# Patient Record
Sex: FEMALE | Race: WHITE | HISPANIC OR LATINO | Employment: FULL TIME | URBAN - METROPOLITAN AREA
[De-identification: names, ages, dates, MRNs, and addresses within clinical notes are randomized per-mention and may not be internally consistent; named-entity substitution may affect disease eponyms.]

---

## 2017-03-11 ENCOUNTER — HOSPITAL ENCOUNTER (OUTPATIENT)
Facility: MEDICAL CENTER | Age: 22
End: 2017-03-12
Attending: SPECIALIST | Admitting: SPECIALIST
Payer: MEDICAID

## 2017-03-12 VITALS
TEMPERATURE: 98.4 F | HEIGHT: 62 IN | SYSTOLIC BLOOD PRESSURE: 122 MMHG | WEIGHT: 201 LBS | HEART RATE: 91 BPM | DIASTOLIC BLOOD PRESSURE: 80 MMHG | BODY MASS INDEX: 36.99 KG/M2

## 2017-03-12 PROCEDURE — 59025 FETAL NON-STRESS TEST: CPT | Performed by: SPECIALIST

## 2017-03-12 NOTE — PROGRESS NOTES
Pt is a 20 yo  with an EDC of 3/29 making her 37w4d here for UCs for last 3 hours.  TOCO shows no UCs and FHT reactive and reassuring.  Denies LOF, vaginal bleeding, and reports + FM    SVE done closed and thick    0010 - Dr. Herrera notified, discharge orders received    0020 - Pt signed discharge orders and will have f/u OB appt with Dr. Herrera next week

## 2017-03-23 ENCOUNTER — HOSPITAL ENCOUNTER (INPATIENT)
Facility: MEDICAL CENTER | Age: 22
LOS: 2 days | End: 2017-03-25
Attending: SPECIALIST | Admitting: SPECIALIST
Payer: MEDICAID

## 2017-03-23 LAB
BASOPHILS # BLD AUTO: 0.2 % (ref 0–1.8)
BASOPHILS # BLD: 0.02 K/UL (ref 0–0.12)
EOSINOPHIL # BLD AUTO: 0.06 K/UL (ref 0–0.51)
EOSINOPHIL NFR BLD: 0.5 % (ref 0–6.9)
ERYTHROCYTE [DISTWIDTH] IN BLOOD BY AUTOMATED COUNT: 45 FL (ref 35.9–50)
ERYTHROCYTE [DISTWIDTH] IN BLOOD BY AUTOMATED COUNT: 45.8 FL (ref 35.9–50)
HCT VFR BLD AUTO: 30.6 % (ref 37–47)
HCT VFR BLD AUTO: 32.5 % (ref 37–47)
HGB BLD-MCNC: 10.3 G/DL (ref 12–16)
HGB BLD-MCNC: 9.6 G/DL (ref 12–16)
HOLDING TUBE BB 8507: NORMAL
IMM GRANULOCYTES # BLD AUTO: 0.11 K/UL (ref 0–0.11)
IMM GRANULOCYTES NFR BLD AUTO: 0.9 % (ref 0–0.9)
LYMPHOCYTES # BLD AUTO: 3.1 K/UL (ref 1–4.8)
LYMPHOCYTES NFR BLD: 25 % (ref 22–41)
MCH RBC QN AUTO: 24.6 PG (ref 27–33)
MCH RBC QN AUTO: 24.6 PG (ref 27–33)
MCHC RBC AUTO-ENTMCNC: 31.4 G/DL (ref 33.6–35)
MCHC RBC AUTO-ENTMCNC: 31.7 G/DL (ref 33.6–35)
MCV RBC AUTO: 77.8 FL (ref 81.4–97.8)
MCV RBC AUTO: 78.5 FL (ref 81.4–97.8)
MONOCYTES # BLD AUTO: 0.81 K/UL (ref 0–0.85)
MONOCYTES NFR BLD AUTO: 6.5 % (ref 0–13.4)
NEUTROPHILS # BLD AUTO: 8.31 K/UL (ref 2–7.15)
NEUTROPHILS NFR BLD: 66.9 % (ref 44–72)
NRBC # BLD AUTO: 0 K/UL
NRBC BLD AUTO-RTO: 0 /100 WBC
PLATELET # BLD AUTO: 245 K/UL (ref 164–446)
PLATELET # BLD AUTO: 254 K/UL (ref 164–446)
PMV BLD AUTO: 10.3 FL (ref 9–12.9)
PMV BLD AUTO: 10.5 FL (ref 9–12.9)
RBC # BLD AUTO: 3.9 M/UL (ref 4.2–5.4)
RBC # BLD AUTO: 4.18 M/UL (ref 4.2–5.4)
WBC # BLD AUTO: 11.9 K/UL (ref 4.8–10.8)
WBC # BLD AUTO: 12.4 K/UL (ref 4.8–10.8)

## 2017-03-23 PROCEDURE — 85025 COMPLETE CBC W/AUTO DIFF WBC: CPT

## 2017-03-23 PROCEDURE — 700111 HCHG RX REV CODE 636 W/ 250 OVERRIDE (IP)

## 2017-03-23 PROCEDURE — 36415 COLL VENOUS BLD VENIPUNCTURE: CPT

## 2017-03-23 PROCEDURE — 700111 HCHG RX REV CODE 636 W/ 250 OVERRIDE (IP): Performed by: SPECIALIST

## 2017-03-23 PROCEDURE — 85027 COMPLETE CBC AUTOMATED: CPT

## 2017-03-23 PROCEDURE — 59409 OBSTETRICAL CARE: CPT

## 2017-03-23 PROCEDURE — 700101 HCHG RX REV CODE 250

## 2017-03-23 PROCEDURE — 700102 HCHG RX REV CODE 250 W/ 637 OVERRIDE(OP): Performed by: SPECIALIST

## 2017-03-23 PROCEDURE — 10H07YZ INSERTION OF OTHER DEVICE INTO PRODUCTS OF CONCEPTION, VIA NATURAL OR ARTIFICIAL OPENING: ICD-10-PCS | Performed by: SPECIALIST

## 2017-03-23 PROCEDURE — 770002 HCHG ROOM/CARE - OB PRIVATE (112)

## 2017-03-23 PROCEDURE — 303615 HCHG EPIDURAL/SPINAL ANESTHESIA FOR LABOR

## 2017-03-23 PROCEDURE — 4A1H74Z MONITORING OF PRODUCTS OF CONCEPTION, CARDIAC ELECTRICAL ACTIVITY, VIA NATURAL OR ARTIFICIAL OPENING: ICD-10-PCS | Performed by: SPECIALIST

## 2017-03-23 PROCEDURE — A9270 NON-COVERED ITEM OR SERVICE: HCPCS | Performed by: SPECIALIST

## 2017-03-23 PROCEDURE — 304965 HCHG RECOVERY SERVICES

## 2017-03-23 RX ORDER — ONDANSETRON 4 MG/1
4 TABLET, ORALLY DISINTEGRATING ORAL EVERY 6 HOURS PRN
Status: DISCONTINUED | OUTPATIENT
Start: 2017-03-23 | End: 2017-03-25 | Stop reason: HOSPADM

## 2017-03-23 RX ORDER — MISOPROSTOL 200 UG/1
800 TABLET ORAL
Status: DISCONTINUED | OUTPATIENT
Start: 2017-03-23 | End: 2017-03-23 | Stop reason: HOSPADM

## 2017-03-23 RX ORDER — VITAMIN A ACETATE, BETA CAROTENE, ASCORBIC ACID, CHOLECALCIFEROL, .ALPHA.-TOCOPHEROL ACETATE, DL-, THIAMINE MONONITRATE, RIBOFLAVIN, NIACINAMIDE, PYRIDOXINE HYDROCHLORIDE, FOLIC ACID, CYANOCOBALAMIN, CALCIUM CARBONATE, FERROUS FUMARATE, ZINC OXIDE, CUPRIC OXIDE 3080; 12; 120; 400; 1; 1.84; 3; 20; 22; 920; 25; 200; 27; 10; 2 [IU]/1; UG/1; MG/1; [IU]/1; MG/1; MG/1; MG/1; MG/1; MG/1; [IU]/1; MG/1; MG/1; MG/1; MG/1; MG/1
1 TABLET, FILM COATED ORAL EVERY MORNING
Status: DISCONTINUED | OUTPATIENT
Start: 2017-03-23 | End: 2017-03-25 | Stop reason: HOSPADM

## 2017-03-23 RX ORDER — ACETAMINOPHEN 325 MG/1
325 TABLET ORAL EVERY 4 HOURS PRN
Status: DISCONTINUED | OUTPATIENT
Start: 2017-03-23 | End: 2017-03-25 | Stop reason: HOSPADM

## 2017-03-23 RX ORDER — ROPIVACAINE HYDROCHLORIDE 2 MG/ML
INJECTION, SOLUTION EPIDURAL; INFILTRATION; PERINEURAL
Status: COMPLETED
Start: 2017-03-23 | End: 2017-03-23

## 2017-03-23 RX ORDER — BUPIVACAINE HYDROCHLORIDE 2.5 MG/ML
INJECTION, SOLUTION EPIDURAL; INFILTRATION; INTRACAUDAL
Status: ACTIVE
Start: 2017-03-23 | End: 2017-03-23

## 2017-03-23 RX ORDER — IBUPROFEN 600 MG/1
600 TABLET ORAL EVERY 6 HOURS PRN
Status: DISCONTINUED | OUTPATIENT
Start: 2017-03-23 | End: 2017-03-25 | Stop reason: HOSPADM

## 2017-03-23 RX ORDER — SODIUM CHLORIDE, SODIUM LACTATE, POTASSIUM CHLORIDE, CALCIUM CHLORIDE 600; 310; 30; 20 MG/100ML; MG/100ML; MG/100ML; MG/100ML
INJECTION, SOLUTION INTRAVENOUS CONTINUOUS
Status: DISPENSED | OUTPATIENT
Start: 2017-03-23 | End: 2017-03-23

## 2017-03-23 RX ORDER — MISOPROSTOL 200 UG/1
600 TABLET ORAL
Status: DISCONTINUED | OUTPATIENT
Start: 2017-03-23 | End: 2017-03-25 | Stop reason: HOSPADM

## 2017-03-23 RX ORDER — OXYCODONE HYDROCHLORIDE AND ACETAMINOPHEN 5; 325 MG/1; MG/1
2 TABLET ORAL EVERY 4 HOURS PRN
Status: DISCONTINUED | OUTPATIENT
Start: 2017-03-23 | End: 2017-03-25 | Stop reason: HOSPADM

## 2017-03-23 RX ORDER — SODIUM CHLORIDE, SODIUM LACTATE, POTASSIUM CHLORIDE, CALCIUM CHLORIDE 600; 310; 30; 20 MG/100ML; MG/100ML; MG/100ML; MG/100ML
INJECTION, SOLUTION INTRAVENOUS
Status: COMPLETED
Start: 2017-03-23 | End: 2017-03-23

## 2017-03-23 RX ORDER — SODIUM CHLORIDE, SODIUM LACTATE, POTASSIUM CHLORIDE, CALCIUM CHLORIDE 600; 310; 30; 20 MG/100ML; MG/100ML; MG/100ML; MG/100ML
INJECTION, SOLUTION INTRAVENOUS PRN
Status: DISCONTINUED | OUTPATIENT
Start: 2017-03-23 | End: 2017-03-25 | Stop reason: HOSPADM

## 2017-03-23 RX ORDER — DOCUSATE SODIUM 100 MG/1
100 CAPSULE, LIQUID FILLED ORAL 2 TIMES DAILY PRN
Status: DISCONTINUED | OUTPATIENT
Start: 2017-03-23 | End: 2017-03-25 | Stop reason: HOSPADM

## 2017-03-23 RX ORDER — ALUMINA, MAGNESIA, AND SIMETHICONE 2400; 2400; 240 MG/30ML; MG/30ML; MG/30ML
30 SUSPENSION ORAL EVERY 6 HOURS PRN
Status: DISCONTINUED | OUTPATIENT
Start: 2017-03-23 | End: 2017-03-23 | Stop reason: HOSPADM

## 2017-03-23 RX ORDER — OXYCODONE HYDROCHLORIDE AND ACETAMINOPHEN 5; 325 MG/1; MG/1
1 TABLET ORAL EVERY 4 HOURS PRN
Status: DISCONTINUED | OUTPATIENT
Start: 2017-03-23 | End: 2017-03-25 | Stop reason: HOSPADM

## 2017-03-23 RX ORDER — ONDANSETRON 2 MG/ML
4 INJECTION INTRAMUSCULAR; INTRAVENOUS EVERY 6 HOURS PRN
Status: DISCONTINUED | OUTPATIENT
Start: 2017-03-23 | End: 2017-03-25 | Stop reason: HOSPADM

## 2017-03-23 RX ADMIN — Medication 1 MILLI-UNITS/MIN: at 01:35

## 2017-03-23 RX ADMIN — IBUPROFEN 600 MG: 600 TABLET, FILM COATED ORAL at 12:42

## 2017-03-23 RX ADMIN — Medication 125 ML/HR: at 12:35

## 2017-03-23 RX ADMIN — SODIUM CHLORIDE, POTASSIUM CHLORIDE, SODIUM LACTATE AND CALCIUM CHLORIDE: 600; 310; 30; 20 INJECTION, SOLUTION INTRAVENOUS at 09:44

## 2017-03-23 RX ADMIN — IBUPROFEN 600 MG: 600 TABLET, FILM COATED ORAL at 18:54

## 2017-03-23 RX ADMIN — ACETAMINOPHEN 325 MG: 325 TABLET, FILM COATED ORAL at 07:30

## 2017-03-23 RX ADMIN — SODIUM CHLORIDE, POTASSIUM CHLORIDE, SODIUM LACTATE AND CALCIUM CHLORIDE: 600; 310; 30; 20 INJECTION, SOLUTION INTRAVENOUS at 01:15

## 2017-03-23 RX ADMIN — ROPIVACAINE HYDROCHLORIDE 200 MG: 2 INJECTION, SOLUTION EPIDURAL; INFILTRATION at 04:55

## 2017-03-23 RX ADMIN — SODIUM CHLORIDE, POTASSIUM CHLORIDE, SODIUM LACTATE AND CALCIUM CHLORIDE 1000 ML: 600; 310; 30; 20 INJECTION, SOLUTION INTRAVENOUS at 08:05

## 2017-03-23 RX ADMIN — SODIUM CHLORIDE, POTASSIUM CHLORIDE, SODIUM LACTATE AND CALCIUM CHLORIDE: 600; 310; 30; 20 INJECTION, SOLUTION INTRAVENOUS at 05:03

## 2017-03-23 RX ADMIN — ONDANSETRON 4 MG: 2 INJECTION, SOLUTION INTRAMUSCULAR; INTRAVENOUS at 06:58

## 2017-03-23 RX ADMIN — SODIUM CHLORIDE, POTASSIUM CHLORIDE, SODIUM LACTATE AND CALCIUM CHLORIDE: 600; 310; 30; 20 INJECTION, SOLUTION INTRAVENOUS at 08:03

## 2017-03-23 ASSESSMENT — PAIN SCALES - GENERAL
PAINLEVEL_OUTOF10: 4
PAINLEVEL_OUTOF10: 3
PAINLEVEL_OUTOF10: 0
PAINLEVEL_OUTOF10: 6

## 2017-03-23 ASSESSMENT — LIFESTYLE VARIABLES
DO YOU DRINK ALCOHOL: NO
DO YOU DRINK ALCOHOL: NO
ALCOHOL_USE: NO
EVER_SMOKED: NEVER

## 2017-03-23 NOTE — CARE PLAN
Problem: Pain  Goal: Alleviation of Pain or a reduction in pain to the patient’s comfort goal  Outcome: PROGRESSING AS EXPECTED  Pt is currently comfortable with epidural.     Problem: Risk for Infection, Impaired Wound Healing  Goal: Remain free from signs and symptoms of infection  Outcome: PROGRESSING AS EXPECTED  Pt shows no s/s of infection. VSS.

## 2017-03-23 NOTE — PROGRESS NOTES
The patient is a very pleasant 21 year old multipara (para 3, with 3 previous vaginal deliveries) who is today 39 weeks and 1 day gestation with prenatal care with myself who presented to L&D just prior to midnight with complaints of leakage of fluid per vagina and spontaneous rupture of membranes was verified and she was admitted and pitocin was started. I saw her at about 03:00 AM. She then received a labor epidural. I just checked her cervix and I found her cervix to be about 4 cm dilated and 70 percent effaced and -2 station and I then placed an IUPC. The fetal heart tracing is category one.   Humberto Herrera M.D.

## 2017-03-23 NOTE — PROGRESS NOTES
Pt arrived to S330 via wheelchair with L&D RN. Report received from Gabbi. Assessment completed. Denies pain at this time. Pt oriented to room, call light, infant security, emergency light, and unit routine. Encouraged to call for assistance.

## 2017-03-23 NOTE — PROGRESS NOTES
0030- 22 y/o, , EDC 3/29/17, EGA 39.1. Here to room 217 with FOB Mat. C/o SROM at 2300. EFM/toco applied, patient denies bleeding, reports positive fm. VSS. SVE as noted. Gross pooling noted upon exam. Dr. Herrera called with updates. Admission orders received.     0130- Pitocin started see MAR. Patient educated.     0300- Dr. Herrera to bedside. SVE as noted. Patient still not feeling UCs yet. Patient would like epidural once she becomes uncomfortable.    0400- Patient requesting epidural. Fluid bolus started.     0440- Dr. Juarez to bedside. Epidural placed without complications.     0500- Vera placed. SVE as noted.     0630- Discussed placement of IUPC with Dr. Herrera for better titration of pitocin. Dr. Herrera to come and place one soon.    0700- Bedside report to SANTO Conde RN. POC discussed.

## 2017-03-23 NOTE — CARE PLAN
Problem: Potential for postpartum infection related to presence of episiotomy/vaginal tear and/or uterine contamination  Goal: Patient will be absent from signs and symptoms of infection  Outcome: PROGRESSING AS EXPECTED  Pt shows no s/s of infection at this time. Vital signs and temperature are WNL    Problem: Alteration in comfort related to episiotomy, vaginal repair and/or after birth pains  Goal: Patient is able to ambulate, care for self and infant  Outcome: PROGRESSING AS EXPECTED  Pt demonstrates ability to ambulate safely and steadily and care for self and infant appropriately

## 2017-03-23 NOTE — IP AVS SNAPSHOT
Home Care Instructions                                                                                                                Juana Child   MRN: [id number]    Department:  POST PARTUM 31   3/23/2017           Follow-up Information     1. Follow up with Humberto Herrera M.D. In 6 weeks.    Specialty:  OB/Gyn    Contact information    [address]  [place] [address]  [phone]         I assume responsibility for securing a follow-up [place] Screening blood test on my baby within the specified date range.    -                  Discharge Instructions       POSTPARTUM DISCHARGE INSTRUCTIONS FOR MOM    YOB: 1995   Age: 21 y.o.               Admit Date: 3/23/2017     Discharge Date: 3/25/2017  Attending Doctor:  Humberto Herrera M.D.                  Allergies:  Review of patient's allergies indicates no known allergies.    Discharged to home by car. Discharged via wheelchair, hospital escort: Yes.  Special equipment needed: Not Applicable  Belongings with: Personal  Be sure to schedule a follow-up appointment with your primary care doctor or any specialists as instructed.     Discharge Plan:   Diet Plan: Discussed  Activity Level: Discussed  Confirmed Follow up Appointment: Appointment Scheduled  Confirmed Symptoms Management: Discussed  Medication Reconciliation Updated: Yes  Influenza Vaccine Indication: Patient Refuses    REASONS TO CALL YOUR OBSTETRICIAN:  1.   Persistent fever or shaking chills (Temperature higher than 100.4)  2.   Heavy bleeding (soaking more than 1 pad per hour); Passing clots  3.   Foul odor from vagina  4.   Mastitis (Breast infection; breast pain, chills, fever, redness)  5.   Urinary pain, burning or frequency  6.   Episiotomy infection  7.   Abdominal incision infection  8.   Severe depression longer than 24 hours    HAND WASHING  · Prior to handling the baby.  · Before breastfeeding or bottle feeding baby.  · After using the bathroom or changing the baby's  "diaper.    WOUND CARE  Ask your physician for additional care instructions.  In general:    ·  Incision:      · Keep clean and dry.    · Do NOT lift anything heavier than your baby for up to 6 weeks.    · There should not be any opening or pus.      VAGINAL CARE  · Nothing inside vagina for 6 weeks: no sexual intercourse, tampons or douching.  · Bleeding may continue for 2-4 weeks.  Amount may vary.    · Call your physician for heavy bleeding which means soaking more than 1 pad per hour    BIRTH CONTROL  · It is possible to become pregnant at any time after delivery and while breastfeeding.  · Plan to discuss a method of birth control with your physician at your follow up visit. visit.    DIET AND ELIMINATION  · Eating more fiber (bran cereal, fruits, and vegetables) and drinking plenty of fluids will help to avoid constipation.  · Urinary frequency after childbirth is normal.    POSTPARTUM BLUES  During the first few days after birth, you may experience a sense of the \"blues\" which may include impatience, irritability or even crying.  These feeling come and go quickly.  However, as many as 1 in 10 women experience emotional symptoms known as postpartum depression.    Postpartum depression:  May start as early as the second or third day after delivery or take several weeks or months to develop.  Symptoms of \"blues\" are present, but are more intense:  Crying spells; loss of appetite; feelings of hopelessness or loss of control; fear of touching the baby; over concern or no concern at all about the baby; little or no concern about your own appearance/caring for yourself; and/or inability to sleep or excessive sleeping.  Contact your physician if you are experiencing any of these symptoms.    Crisis Hotline:  · [facility] Crisis Hotline:  3-538-JPHKRPO  Or 1-402.564.5178  · [place] Crisis Hotline:  1-570.541.3700  Or 155-432-4895    PREVENTING SHAKEN BABY:  If you are angry or stressed, PUT THE BABY IN THE CRIB, " "step away, take some deep breaths, and wait until you are calm to care for the baby.  DO NOT SHAKE THE BABY.  You are not alone, call a supporter for help.    · Crisis Call Center 24/7 crisis line 926-052-3399 or 1-176.146.8780  · You can also text them, text \"ANSWER\" to 778319    QUIT SMOKING/TOBACCO USE:  I understand the use of any tobacco products increases my chance of suffering from future heart disease and could cause other illnesses which may shorten my life. Quitting the use of tobacco products is the single most important thing I can do to improve my health. For further information on smoking / tobacco cessation call a Toll Free Quit Line at 1-801.172.3994 (*[employer]) or 1-764.993.7339 (American Lung Association) or you can access the web based program at www.lungusa.org.    · [place] Tobacco Users Help Line:  (756) 357-5586       Toll Free: 1-533.224.6032  · Quit Tobacco Program [facility] (895)405-0218    DEPRESSION / SUICIDE RISK:  As you are discharged from this [facility], it is important to learn how to keep safe from harming yourself.    Recognize the warning signs:  · Abrupt changes in personality, positive or negative- including increase in energy   · Giving away possessions  · Change in eating patterns- significant weight changes-  positive or negative  · Change in sleeping patterns- unable to sleep or sleeping all the time   · Unwillingness or inability to communicate  · Depression  · Unusual sadness, discouragement and loneliness  · Talk of wanting to die  · Neglect of personal appearance   · Rebelliousness- reckless behavior  · Withdrawal from people/activities they love  · Confusion- inability to concentrate     If you or a loved one observes any of these behaviors or has concerns about self-harm, here's what you can do:  · Talk about it- your feelings and reasons for harming yourself  · Remove any means that you might use to hurt yourself " (examples: pills, rope, extension cords, firearm)  · Get professional help from the community (Mental Health, Substance Abuse, psychological counseling)  · Do not be alone:Call your Safe Contact- someone whom you trust who will be there for you.  · Call your local CRISIS HOTLINE 151-6954 or 686-608-1435  · Call your local Children's Mobile Crisis Response Team [place] (970) 925-7531 or www.[employer] ([employer])  · Call the toll free National Suicide Prevention Hotlines   · National Suicide Prevention Lifeline 551-169-YRDV (4352)  · National Hope Line Network 800-SUICIDE (288-0035)    DISCHARGE SURVEY:  Thank you for choosing [facility].  We hope we provided you with very good care.  You may be receiving a survey in the mail.  Please fill it out.  Your opinion is valuable to us.    ADDITIONAL EDUCATIONAL MATERIALS GIVEN TO PATIENT:        My signature on this form indicates that:  1.  I have reviewed and understand the above information  2.  My questions regarding this information have been answered to my satisfaction.  3.  I have formulated a plan with my discharge nurse to obtain my prescribed medication for home.         Discharge Medication Instructions:    Below are the medications your physician expects you to take upon discharge:    Review all your home medications and newly ordered medications with your doctor and/or pharmacist. Follow medication instructions as directed by your doctor and/or pharmacist.    Please keep your medication list with you and share with your physician.               Medication List      START taking these medications        Instructions     MG Caps   Last time this was given:  100 mg on 3/24/2017  6:36 PM    Take 100 mg by mouth 2 times a day as needed for Constipation.   Dose:  100 mg       ferrous sulfate 325 (65 FE) MG tablet    Take 1 Tab by mouth every day.   Dose:  325 mg       ibuprofen 600 MG Tabs   Last time this was given:  600 mg on 3/25/2017  6:22 AM      Commonly known as:  MOTRIN    Take 1 Tab by mouth every 6 hours as needed (For cramping after delivery; do not give if patient is receiving ketorolac (Toradol)).   Dose:  600 mg               Crisis Hotline:     [place] Crisis Hotline:  9-238-YLWWGEG or 1-257.437.5729    [place] Crisis Hotline:    1-378.215.4589 or 503-490-6138        Disclaimer           _____________________________________                     __________       ________       Patient/Mother Signature or Legal                          Date                   Time

## 2017-03-23 NOTE — PROGRESS NOTES
0700 - report received from [place] Magaña RN. Family x1 at bedside. POC discussed, questions encouraged and answered, understanding verbalized. Will continue to monitor.   0720-Dr. Herrera at the bedside. SVE 4/70/-2. IUPC placed. Pt tolerated well. POC discussed and questions answered.   0900-SVE 6/100/-2  0940-SVE 7/100/-1  0945-Dr. Herrera updated on pt status, SVE, and repetitive variable decelerations. Order received to start an amnioinfusion with LR with a 400 cc bolus and then running at 100 cc/ hr afterwards.   0950-Amnioinfusion started.   1020-SVE 7/100/-1. Done by MARJORIE Mckeon RN.  1100-SVE c/+1. Dr. Herrera updated on SVE  1128-Pushing started.   1145-Dr. Herrera at the bedside.   1149- of viable female infant. 8/9 APGARS  1152-Spontaneous delivery of normal, intact placenta.   1440- up to bathroom with assistance from RN. Marisa care provided and taught, understanding verbalized.   1450- transferred to postpartum via wheelchair with infant in arms, report given to Richy RN.

## 2017-03-23 NOTE — DELIVERY NOTE
Normal spontaneous vaginal delivery.   Live term female   Apgar scores of 8 and 9 at one and five minutes respectively.   Weight 3,035 grams  Over intact perineum under continuous epidural anesthesia  Placenta simply delivered, examined and found to be intact.   Small posterior vaginal wall abrasion repaired with a single figure of eight suture with 3-0 chromic.   No vag pacs or rectal sutures.   EBL about 200 cc's.   Humberto Herrera M.D.

## 2017-03-23 NOTE — IP AVS SNAPSHOT
[employer] Access Code: [email or web address]  Expires: 4/24/2017 10:09 AM    Your email address is not on file at [facility].  Email Addresses are required for you to sign up for [employer], please contact [phone] to verify your personal information and to provide your email address prior to attempting to register for [employer].    Juana Mateusz  [address] [place] [address], [place] [address]    [employer]  A secure, online tool to manage your health information     [facility]’s [employer]® is a secure, online tool that connects you to your personalized health information from the privacy of your home -- day or night - making it very easy for you to manage your healthcare. Once the activation process is completed, you can even access your medical information using the [employer] ronan, which is available for free in the Apple Ronan store or Google Play store.     To learn more about [employer], visit [email or web address]    There are two levels of access available (as shown below):   My Chart Features  [facility] Primary Care Doctor [facility]  Specialists [facility]  Urgent  Care Non-[facility] Primary Care Doctor   Email your healthcare team securely and privately 24/7 X X X    Manage appointments: schedule your next appointment; view details of past/upcoming appointments X      Request prescription refills. X      View recent personal medical records, including lab and immunizations X X X X   View health record, including health history, allergies, medications X X X X   Read reports about your outpatient visits, procedures, consult and ER notes X X X X   See your discharge summary, which is a recap of your hospital and/or ER visit that includes your diagnosis, lab results, and care plan X X  X     How to register for [employer]:  Once your e-mail address has been verified, follow the following steps to sign up for [employer].     1. Go to  [email or web address]  2. Click on the Sign Up Now box, which takes you to the New Member Sign Up page. You  will need to provide the following information:  a. Enter your [employer] Access Code exactly as it appears at the top of this page. (You will not need to use this code after you’ve completed the sign-up process. If you do not sign up before the expiration date, you must request a new code.)   b. Enter your date of birth.   c. Enter your home email address.   d. Click Submit, and follow the next screen’s instructions.  3. Create a [employer] ID. This will be your [employer] login ID and cannot be changed, so think of one that is secure and easy to remember.  4. Create a [employer] password. You can change your password at any time.  5. Enter your Password Reset Question and Answer. This can be used at a later time if you forget your password.   6. Enter your e-mail address. This allows you to receive e-mail notifications when new information is available in [employer].  7. Click Sign Up. You can now view your health information.    For assistance activating your [employer] account, call [phone]

## 2017-03-24 PROCEDURE — 700112 HCHG RX REV CODE 229: Performed by: SPECIALIST

## 2017-03-24 PROCEDURE — 302131 K PAD MOTOR: Performed by: SPECIALIST

## 2017-03-24 PROCEDURE — A9270 NON-COVERED ITEM OR SERVICE: HCPCS | Performed by: SPECIALIST

## 2017-03-24 PROCEDURE — 302151 K-PAD 14X20: Performed by: SPECIALIST

## 2017-03-24 PROCEDURE — 700102 HCHG RX REV CODE 250 W/ 637 OVERRIDE(OP): Performed by: SPECIALIST

## 2017-03-24 PROCEDURE — 770002 HCHG ROOM/CARE - OB PRIVATE (112)

## 2017-03-24 RX ADMIN — DOCUSATE SODIUM 100 MG: 100 CAPSULE ORAL at 18:36

## 2017-03-24 RX ADMIN — IBUPROFEN 600 MG: 600 TABLET, FILM COATED ORAL at 05:42

## 2017-03-24 RX ADMIN — IBUPROFEN 600 MG: 600 TABLET, FILM COATED ORAL at 18:31

## 2017-03-24 RX ADMIN — IBUPROFEN 600 MG: 600 TABLET, FILM COATED ORAL at 12:10

## 2017-03-24 RX ADMIN — Medication 1 TABLET: at 10:05

## 2017-03-24 ASSESSMENT — PAIN SCALES - GENERAL
PAINLEVEL_OUTOF10: 3
PAINLEVEL_OUTOF10: 6
PAINLEVEL_OUTOF10: 2
PAINLEVEL_OUTOF10: 5
PAINLEVEL_OUTOF10: 3
PAINLEVEL_OUTOF10: 3
PAINLEVEL_OUTOF10: 2

## 2017-03-24 ASSESSMENT — PATIENT HEALTH QUESTIONNAIRE - PHQ9
SUM OF ALL RESPONSES TO PHQ9 QUESTIONS 1 AND 2: 0
SUM OF ALL RESPONSES TO PHQ QUESTIONS 1-9: 0
1. LITTLE INTEREST OR PLEASURE IN DOING THINGS: NOT AT ALL
2. FEELING DOWN, DEPRESSED, IRRITABLE, OR HOPELESS: NOT AT ALL

## 2017-03-24 NOTE — PROGRESS NOTES
Assessment completed. Fundus is firm, lochia is light. No clots noted. Discussed plan of care for this shift. Encouraged to call for needs or concerns. Call light in reach. Patient will call for pain medications.

## 2017-03-24 NOTE — PROGRESS NOTES
Pt assessment complete, wnl. Fundus firm with minimal discharge.  Pt ambulating and voiding without difficulty. Bonding well with infant, assisted pt with breast feeding. Plan of care discussed with patient for the day. Questions answered. Encouraged to call with needs, will monitor.

## 2017-03-24 NOTE — DISCHARGE PLANNING
Referral: NBN regarding maternal history of anxiety and depression.  Intervention: Discussed with nursing and reviewed medical record. Depression and anxiety are from 2013. Mother very appropriate with infant care.   Intervention: Met with mother Juana. Infant Vero Infante. Mother confirms address and phone. She has 3 other children. FOB is Mat Infante. Infant will be followed ant [facility] where siblings receive care as well. She has Medicaid and food stamps. 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 Mother works at [employer]. They are prepared for infant. Mother admits to history of anxiety and depression in 2013. She denies any recent symptoms. She is not concerned but would discuss with her MD if needed. She has good support system.  Plan: Nothing further needed. Infant to discharge with mother when ready.

## 2017-03-24 NOTE — DELIVERY NOTE
DATE OF SERVICE:  2017    The patient is a very pleasant 21-year-old multipara (on admission, para 3   with 3 previous vaginal deliveries) with prenatal care with myself during the   course of this pregnancy and she is today 39 weeks and 1 day gestation and she   presented to labor and delivery around midnight with complaints of leakage of   fluid per vagina, starting at about 11:00 p.m. and on presentation to labor   and delivery obvious evidence of spontaneous rupture of membranes was noted   and so she was admitted to labor and delivery and her cervix initially was   found to be about 3 cm dilated or so and somewhat thick.  She was started on   Pitocin.  The Pitocin was increased.  She received a labor epidural, which   worked well.  I saw her at about 3:00 this morning.  At 3:00 in the morning, I   checked her cervix and found her cervix to about 4 cm dilated and 70% effaced   and -2 station and at that time placed an intrauterine pressure catheter.    Her labor continued.  A labor epidural worked well.  Her fetal heart tracing   continued to be category 1.  Then, at about noon time, or so (Thursday,   2017), she went on to have a normal spontaneous vaginal delivery of a   live term female  with Apgar scores of 8 and 9 at 1 and 5 minutes   respectively and a  weight of 3035 g.  Baby was delivered over an   intact perineum under continuous epidural anesthesia.  The placenta was simply   delivered and examined and found to be complete.  A small posterior wall   vaginal abrasion was sutured with placement of a single interrupted   figure-of-eight suture of 3-0 chromic.  Otherwise, there were no lacerations.    Bimanual vaginal exam revealed that the uterus was firm and that there were   no gauze sponges in the vagina.  Digital rectal exam was performed and   revealed that the anal sphincter was circumferentially intact and that the   rectal mucosa was intact.  There are no sutures in the  rectum.    ESTIMATED BLOOD LOSS:  Approximately 200 mL.       ____________________________________     MD RAMANDEEP CORTES / NTS    DD:  03/23/2017 12:10:45  DT:  03/23/2017 20:58:56    D#:  [id number]  Job#:  [id number]

## 2017-03-24 NOTE — CARE PLAN
Problem: Alteration in comfort related to episiotomy, vaginal repair and/or after birth pains  Goal: Patient verbalizes acceptable pain level  Outcome: PROGRESSING AS EXPECTED  Patient states pain is tolerable with medication and hot packs.  Will continue to monitor with hourly rounding and will medicate using 0-10 pain scale.    Problem: Potential knowledge deficit related to lack of understanding of self and  care  Goal: Patient will demonstrate ability to care for self and infant  Outcome: PROGRESSING AS EXPECTED  Patient is able to care for self and baby, patient recognizes baby cues and responds appropriately.

## 2017-03-24 NOTE — CARE PLAN
Problem: Altered physiologic condition related to immediate post-delivery state and potential for bleeding/hemorrhage  Goal: Patient physiologically stable as evidenced by normal lochia, palpable uterine involution and vital signs within normal limits  Outcome: PROGRESSING AS EXPECTED  IV pitocin infusing at 125ml/hr, fundus is firm, lochia is light.    Problem: Potential for postpartum infection related to presence of episiotomy/vaginal tear and/or uterine contamination  Goal: Patient will be absent from signs and symptoms of infection  Outcome: PROGRESSING AS EXPECTED  Will monitor for changes in amount of bleeding, or vital signs.

## 2017-03-24 NOTE — PROGRESS NOTES
POST PARTUM DAY # 1  The patient complains of cramping pain.   She says that she feels fine other wise and she says that she has no other problems or complaints.   She says that she would like to say until tomorrow.   The patient's vital signs are stable and she is afebrile.   She appears well developed and well nourished and relaxed and alert and comfortable and in no apparent distress.   Labs: the patient's hemoglobin went from 10.3 grams per deciliter antepartum to 9.6 grams per deciliter post partum.   We will plan on discharge home tomorrow.   Humberto Herrera M.D.

## 2017-03-25 VITALS
TEMPERATURE: 97.8 F | BODY MASS INDEX: 36.68 KG/M2 | OXYGEN SATURATION: 93 % | SYSTOLIC BLOOD PRESSURE: 92 MMHG | HEART RATE: 64 BPM | HEIGHT: 63 IN | DIASTOLIC BLOOD PRESSURE: 59 MMHG | RESPIRATION RATE: 16 BRPM | WEIGHT: 207 LBS

## 2017-03-25 PROCEDURE — A9270 NON-COVERED ITEM OR SERVICE: HCPCS | Performed by: SPECIALIST

## 2017-03-25 PROCEDURE — 700102 HCHG RX REV CODE 250 W/ 637 OVERRIDE(OP): Performed by: SPECIALIST

## 2017-03-25 RX ORDER — FERROUS SULFATE 325(65) MG
325 TABLET ORAL DAILY
Qty: 30 TAB | Refills: 0 | Status: SHIPPED | OUTPATIENT
Start: 2017-03-25 | End: 2017-06-02

## 2017-03-25 RX ORDER — PSEUDOEPHEDRINE HCL 30 MG
100 TABLET ORAL 2 TIMES DAILY PRN
Qty: 60 CAP | Refills: 1 | Status: SHIPPED | OUTPATIENT
Start: 2017-03-25 | End: 2017-06-02

## 2017-03-25 RX ORDER — IBUPROFEN 600 MG/1
600 TABLET ORAL EVERY 6 HOURS PRN
Qty: 30 TAB | Refills: 0 | Status: SHIPPED | OUTPATIENT
Start: 2017-03-25 | End: 2020-10-13

## 2017-03-25 RX ADMIN — IBUPROFEN 600 MG: 600 TABLET, FILM COATED ORAL at 00:42

## 2017-03-25 RX ADMIN — IBUPROFEN 600 MG: 600 TABLET, FILM COATED ORAL at 06:22

## 2017-03-25 RX ADMIN — Medication 1 TABLET: at 08:32

## 2017-03-25 ASSESSMENT — PAIN SCALES - GENERAL
PAINLEVEL_OUTOF10: 2
PAINLEVEL_OUTOF10: 5
PAINLEVEL_OUTOF10: 5
PAINLEVEL_OUTOF10: 2
PAINLEVEL_OUTOF10: 5

## 2017-03-25 NOTE — CARE PLAN
Problem: Alteration in comfort related to episiotomy, vaginal repair and/or after birth pains  Goal: Patient verbalizes acceptable pain level  Outcome: PROGRESSING AS EXPECTED  Patient verbalized acceptable pain level @ this time. Will be medicated as needed.    Problem: Potential knowledge deficit related to lack of understanding of self and  care  Goal: Patient will verbalize understanding of self and infant care  Outcome: PROGRESSING AS EXPECTED  Patient verbalized that she can able to take care of her self and infant.

## 2017-03-25 NOTE — DISCHARGE INSTRUCTIONS
POSTPARTUM DISCHARGE INSTRUCTIONS FOR MOM    YOB: 1995   Age: 21 y.o.               Admit Date: 3/23/2017     Discharge Date: 3/25/2017  Attending Doctor:  Humberto Herrera M.D.                  Allergies:  Review of patient's allergies indicates no known allergies.    Discharged to home by car. Discharged via wheelchair, hospital escort: Yes.  Special equipment needed: Not Applicable  Belongings with: Personal  Be sure to schedule a follow-up appointment with your primary care doctor or any specialists as instructed.     Discharge Plan:   Diet Plan: Discussed  Activity Level: Discussed  Confirmed Follow up Appointment: Appointment Scheduled  Confirmed Symptoms Management: Discussed  Medication Reconciliation Updated: Yes  Influenza Vaccine Indication: Patient Refuses    REASONS TO CALL YOUR OBSTETRICIAN:  1.   Persistent fever or shaking chills (Temperature higher than 100.4)  2.   Heavy bleeding (soaking more than 1 pad per hour); Passing clots  3.   Foul odor from vagina  4.   Mastitis (Breast infection; breast pain, chills, fever, redness)  5.   Urinary pain, burning or frequency  6.   Episiotomy infection  7.   Abdominal incision infection  8.   Severe depression longer than 24 hours    HAND WASHING  · Prior to handling the baby.  · Before breastfeeding or bottle feeding baby.  · After using the bathroom or changing the baby's diaper.    WOUND CARE  Ask your physician for additional care instructions.  In general:    ·  Incision:      · Keep clean and dry.    · Do NOT lift anything heavier than your baby for up to 6 weeks.    · There should not be any opening or pus.      VAGINAL CARE  · Nothing inside vagina for 6 weeks: no sexual intercourse, tampons or douching.  · Bleeding may continue for 2-4 weeks.  Amount may vary.    · Call your physician for heavy bleeding which means soaking more than 1 pad per hour    BIRTH CONTROL  · It is possible to become pregnant at any time after delivery  "and while breastfeeding.  · Plan to discuss a method of birth control with your physician at your follow up visit. visit.    DIET AND ELIMINATION  · Eating more fiber (bran cereal, fruits, and vegetables) and drinking plenty of fluids will help to avoid constipation.  · Urinary frequency after childbirth is normal.    POSTPARTUM BLUES  During the first few days after birth, you may experience a sense of the \"blues\" which may include impatience, irritability or even crying.  These feeling come and go quickly.  However, as many as 1 in 10 women experience emotional symptoms known as postpartum depression.    Postpartum depression:  May start as early as the second or third day after delivery or take several weeks or months to develop.  Symptoms of \"blues\" are present, but are more intense:  Crying spells; loss of appetite; feelings of hopelessness or loss of control; fear of touching the baby; over concern or no concern at all about the baby; little or no concern about your own appearance/caring for yourself; and/or inability to sleep or excessive sleeping.  Contact your physician if you are experiencing any of these symptoms.    Crisis Hotline:  · [place] Crisis Hotline:  3-573-ZFHMWVK  Or 1-127.766.6038  · [place] Crisis Hotline:  1-201.513.8531  Or 386-214-4351    PREVENTING SHAKEN BABY:  If you are angry or stressed, PUT THE BABY IN THE CRIB, step away, take some deep breaths, and wait until you are calm to care for the baby.  DO NOT SHAKE THE BABY.  You are not alone, call a supporter for help.    · Crisis Call Center 24/7 crisis line 947-124-0096 or 1-881.110.1872  · You can also text them, text \"ANSWER\" to 530034    QUIT SMOKING/TOBACCO USE:  I understand the use of any tobacco products increases my chance of suffering from future heart disease and could cause other illnesses which may shorten my life. Quitting the use of tobacco products is the single most important thing I can do to improve my health. For " further information on smoking / tobacco cessation call a Toll Free Quit Line at 1-912.268.5686 (*[employer] [place]) or 1-482.716.2663 (American Lung Association) or you can access the web based program at www.lungusa.org.    · [place] Tobacco Users Help Line:  (767) 312-3972       Toll Free: 1-167.363.9152  · Quit Tobacco Program [facility] (606)453-6853    DEPRESSION / SUICIDE RISK:  As you are discharged from this [facility], it is important to learn how to keep safe from harming yourself.    Recognize the warning signs:  · Abrupt changes in personality, positive or negative- including increase in energy   · Giving away possessions  · Change in eating patterns- significant weight changes-  positive or negative  · Change in sleeping patterns- unable to sleep or sleeping all the time   · Unwillingness or inability to communicate  · Depression  · Unusual sadness, discouragement and loneliness  · Talk of wanting to die  · Neglect of personal appearance   · Rebelliousness- reckless behavior  · Withdrawal from people/activities they love  · Confusion- inability to concentrate     If you or a loved one observes any of these behaviors or has concerns about self-harm, here's what you can do:  · Talk about it- your feelings and reasons for harming yourself  · Remove any means that you might use to hurt yourself (examples: pills, rope, extension cords, firearm)  · Get professional help from the community (Mental Health, Substance Abuse, psychological counseling)  · Do not be alone:Call your Safe Contact- someone whom you trust who will be there for you.  · Call your local CRISIS HOTLINE 877-6431 or 308-362-2997  · Call your local Children's Mobile Crisis Response Team [place] (828) 973-8062 or www.[employer]  · Call the toll free National Suicide Prevention Hotlines   · National Suicide Prevention Lifeline 666-602-JNHP (5835)  · National Hope Line Network 800-SUICIDE  (200-2437)    DISCHARGE SURVEY:  Thank you for choosing [facility].  We hope we provided you with very good care.  You may be receiving a survey in the mail.  Please fill it out.  Your opinion is valuable to us.    ADDITIONAL EDUCATIONAL MATERIALS GIVEN TO PATIENT:        My signature on this form indicates that:  1.  I have reviewed and understand the above information  2.  My questions regarding this information have been answered to my satisfaction.  3.  I have formulated a plan with my discharge nurse to obtain my prescribed medication for home.

## 2017-03-25 NOTE — PROGRESS NOTES
Discharge instructions discussed. No questions at this time. Prescription given and explained well.

## 2017-04-02 NOTE — DISCHARGE SUMMARY
ADMISSION DIAGNOSES:  1.  Intrauterine pregnancy at 39 weeks and 1 day gestation.  2.  Spontaneous rupture of membranes.    DISCHARGE DIAGNOSES:  1.  Intrauterine pregnancy at 39 weeks and 1 day gestation.  2.  Spontaneous rupture of membranes.  3.  Live term female  with Apgar scores of 8 and 9 at 1 and 5 minutes   respectively and a  weight of 3035 g.  4.  Postpartum maternal anemia.    PROCEDURE:  Normal spontaneous vaginal delivery.    COMPLICATIONS:  None.    HOSPITAL COURSE:  The patient was admitted to [facility]   Labor and Delivery, a little bit after midnight on 2017.  She said that   she had noticed leakage of fluid per vagina starting at about 11:00 p.m. and   presented to labor and delivery around midnight or so or perhaps a little bit   after midnight and on presentation to labor and delivery, exam revealed   obvious evidence of spontaneous rupture of membranes.  She had had her   prenatal care with myself during the course of this pregnancy.  On   presentation to labor and delivery, obvious evidence of spontaneous rupture of   membranes was noted and her cervix initially was found to be about 3 cm   dilated or so and somewhat thick and she was started on Pitocin and the   Pitocin was increased.  She received a labor epidural, which worked well.  I   saw her at about 3:00 a.m. in the morning (2017) and when I saw her at   3:00 a.m. in the morning, I checked her cervix and I found her cervix to be   about 4 cm dilated and 70% effaced and -2 station and at that time, placed an   intrauterine pressure catheter.  Her labor continued.  Her labor epidural   worked well.  The fetal heart tracing continued to be category 1.  Then, at   about noon time or so that day (Thursday, 2017), she went on to have a   normal spontaneous vaginal delivery of a live term female  with Apgar   scores of 8 and 9 at 1 and 5 minutes respectively and a  weight of  3035   g.  Baby was delivered over an intact perineum under continuous epidural   anesthesia and the placenta was simply delivered and examined and found to be   complete and a small posterior wall vaginal abrasion was sutured with   placement of single interrupted figure-of-eight suture of 3-0 chromic.    Otherwise, there were no other lacerations.  The patient's antepartum   hemoglobin was 10.3 g/dL and her postpartum hemoglobin later on the day of   admission was 9.6 g/dL.  She was discharged home on Saturday, 03/25/2017,   postpartum day #2, in stable condition and her vital signs were stable and she   was afebrile and appeared well developed and well nourished and relaxed and   alert and comfortable and in no apparent distress and was discharged home with   prescriptions for ibuprofen, iron sulfate, and stool softeners.  I asked her   to follow up with me in the office in 2 weeks for postpartum visit and to call   or contact me at any time should she ever have any problems or questions or   complaints and she said that she would to do so.       ____________________________________     RYAN RODRIGUEZ MD    MED / N[facility]    DD:  04/02/2017 10:28:13  DT:  04/02/2017 16:44:20    D#:  [id number]  Job#:  [id number]

## 2017-06-02 ENCOUNTER — APPOINTMENT (OUTPATIENT)
Dept: RADIOLOGY | Facility: MEDICAL CENTER | Age: 22
End: 2017-06-02
Attending: EMERGENCY MEDICINE
Payer: MEDICAID

## 2017-06-02 ENCOUNTER — HOSPITAL ENCOUNTER (EMERGENCY)
Facility: MEDICAL CENTER | Age: 22
End: 2017-06-02
Attending: EMERGENCY MEDICINE
Payer: MEDICAID

## 2017-06-02 VITALS
TEMPERATURE: 98.4 F | DIASTOLIC BLOOD PRESSURE: 74 MMHG | BODY MASS INDEX: 33.05 KG/M2 | HEART RATE: 65 BPM | WEIGHT: 186.51 LBS | HEIGHT: 63 IN | OXYGEN SATURATION: 98 % | RESPIRATION RATE: 19 BRPM | SYSTOLIC BLOOD PRESSURE: 108 MMHG

## 2017-06-02 DIAGNOSIS — G43.909 MIGRAINE WITHOUT STATUS MIGRAINOSUS, NOT INTRACTABLE, UNSPECIFIED MIGRAINE TYPE: ICD-10-CM

## 2017-06-02 LAB
ANION GAP SERPL CALC-SCNC: 10 MMOL/L (ref 0–11.9)
BUN SERPL-MCNC: 13 MG/DL (ref 8–22)
CALCIUM SERPL-MCNC: 10.3 MG/DL (ref 8.5–10.5)
CHLORIDE SERPL-SCNC: 104 MMOL/L (ref 96–112)
CO2 SERPL-SCNC: 23 MMOL/L (ref 20–33)
CREAT SERPL-MCNC: 0.56 MG/DL (ref 0.5–1.4)
GFR SERPL CREATININE-BSD FRML MDRD: >60 ML/MIN/1.73 M 2
GLUCOSE SERPL-MCNC: 83 MG/DL (ref 65–99)
POTASSIUM SERPL-SCNC: 4.3 MMOL/L (ref 3.6–5.5)
SODIUM SERPL-SCNC: 137 MMOL/L (ref 135–145)

## 2017-06-02 PROCEDURE — 96375 TX/PRO/DX INJ NEW DRUG ADDON: CPT

## 2017-06-02 PROCEDURE — 700105 HCHG RX REV CODE 258: Performed by: EMERGENCY MEDICINE

## 2017-06-02 PROCEDURE — 96374 THER/PROPH/DIAG INJ IV PUSH: CPT

## 2017-06-02 PROCEDURE — 80048 BASIC METABOLIC PNL TOTAL CA: CPT

## 2017-06-02 PROCEDURE — 700111 HCHG RX REV CODE 636 W/ 250 OVERRIDE (IP): Performed by: EMERGENCY MEDICINE

## 2017-06-02 PROCEDURE — 70450 CT HEAD/BRAIN W/O DYE: CPT

## 2017-06-02 PROCEDURE — 36415 COLL VENOUS BLD VENIPUNCTURE: CPT

## 2017-06-02 PROCEDURE — 99284 EMERGENCY DEPT VISIT MOD MDM: CPT

## 2017-06-02 RX ORDER — SODIUM CHLORIDE 9 MG/ML
1000 INJECTION, SOLUTION INTRAVENOUS ONCE
Status: COMPLETED | OUTPATIENT
Start: 2017-06-02 | End: 2017-06-02

## 2017-06-02 RX ORDER — ONDANSETRON 2 MG/ML
4 INJECTION INTRAMUSCULAR; INTRAVENOUS ONCE
Status: COMPLETED | OUTPATIENT
Start: 2017-06-02 | End: 2017-06-02

## 2017-06-02 RX ORDER — ONDANSETRON 4 MG/1
4 TABLET, ORALLY DISINTEGRATING ORAL EVERY 8 HOURS PRN
Qty: 10 TAB | Refills: 1 | Status: SHIPPED | OUTPATIENT
Start: 2017-06-02 | End: 2020-10-13

## 2017-06-02 RX ORDER — KETOROLAC TROMETHAMINE 30 MG/ML
30 INJECTION, SOLUTION INTRAMUSCULAR; INTRAVENOUS ONCE
Status: COMPLETED | OUTPATIENT
Start: 2017-06-02 | End: 2017-06-02

## 2017-06-02 RX ADMIN — KETOROLAC TROMETHAMINE 30 MG: 30 INJECTION, SOLUTION INTRAMUSCULAR at 18:30

## 2017-06-02 RX ADMIN — SODIUM CHLORIDE 1000 ML: 9 INJECTION, SOLUTION INTRAVENOUS at 18:30

## 2017-06-02 RX ADMIN — ONDANSETRON 4 MG: 2 INJECTION INTRAMUSCULAR; INTRAVENOUS at 18:30

## 2017-06-02 NOTE — ED AVS SNAPSHOT
6/2/2017    Juana Child  [address] [facility] [facility] [address]    Dear Juana:    [facility] wants to ensure your discharge home is safe and you or your loved ones have had all of your questions answered regarding your care after you leave the hospital.    Below is a list of resources and contact information should you have any questions regarding your hospital stay, follow-up instructions, or active medical symptoms.    Questions or Concerns Regarding… Contact   Medical Questions Related to Your Discharge  (7 days a week, 8am-5pm) Contact a Nurse Care Coordinator   [phone]   Medical Questions Not Related to Your Discharge  (24 hours a day / 7 days a week)  Contact the Nurse Health Line   [phone]    Medications or Discharge Instructions Refer to your discharge packet   or contact your [facility] Primary Care Provider   [phone]   Follow-up Appointment(s) Schedule your appointment via [employer]   or contact Scheduling [phone]   Billing Review your statement via [employer]  or contact Billing [phone]   Medical Records Review your records via [employer]   or contact Medical Records [phone]     You may receive a telephone call within two days of discharge. This call is to make certain you understand your discharge instructions and have the opportunity to have any questions answered. You can also easily access your medical information, test results and upcoming appointments via the [employer] free online health management tool. You can learn more and sign up at [employer]. For assistance setting up your [employer] account, please call [phone].    Once again, we want to ensure your discharge home is safe and that you have a clear understanding of any next steps in your care. If you have any questions or concerns, please do not hesitate to contact us, we are here for you. Thank you for choosing [facility] for your healthcare needs.    Sincerely,    Your [facility] Healthcare Team

## 2017-06-02 NOTE — ED AVS SNAPSHOT
Cycell Access Code: [email or web address]  Expires: 7/2/2017  7:35 PM    Your email address is not on file at [employer].  Email Addresses are required for you to sign up for [employer], please contact [phone] to verify your personal information and to provide your email address prior to attempting to register for [employer].    Juana Mateusz  [address] [place] [address], [place] [address]    Cycell  A secure, online tool to manage your health information     [employer]’s Cycell® is a secure, online tool that connects you to your personalized health information from the privacy of your home -- day or night - making it very easy for you to manage your healthcare. Once the activation process is completed, you can even access your medical information using the Cycell ronan, which is available for free in the [employer] store or Google Play store.     To learn more about Cycell, visit [email or web address]    There are two levels of access available (as shown below):   My Chart Features  [facility] Primary Care Doctor [facility]  Specialists [facility]  Urgent  Care Non-[facility] Primary Care Doctor   Email your healthcare team securely and privately 24/7 X X X    Manage appointments: schedule your next appointment; view details of past/upcoming appointments X      Request prescription refills. X      View recent personal medical records, including lab and immunizations X X X X   View health record, including health history, allergies, medications X X X X   Read reports about your outpatient visits, procedures, consult and ER notes X X X X   See your discharge summary, which is a recap of your hospital and/or ER visit that includes your diagnosis, lab results, and care plan X X  X     How to register for Cycell:  Once your e-mail address has been verified, follow the following steps to sign up for Cycell.     1. Go to  [email or web address]  2. Click on the Sign Up Now box, which takes you to the New Member Sign Up page. You  will need to provide the following information:  a. Enter your [employer] Access Code exactly as it appears at the top of this page. (You will not need to use this code after you’ve completed the sign-up process. If you do not sign up before the expiration date, you must request a new code.)   b. Enter your date of birth.   c. Enter your home email address.   d. Click Submit, and follow the next screen’s instructions.  3. Create a [employer] ID. This will be your [employer] login ID and cannot be changed, so think of one that is secure and easy to remember.  4. Create a [employer] password. You can change your password at any time.  5. Enter your Password Reset Question and Answer. This can be used at a later time if you forget your password.   6. Enter your e-mail address. This allows you to receive e-mail notifications when new information is available in [employer].  7. Click Sign Up. You can now view your health information.    For assistance activating your [employer] account, call [phone]

## 2017-06-02 NOTE — ED AVS SNAPSHOT
Home Care Instructions                                                                                                                Juana Child   MRN: [id number]    Department:  [facility], Emergency Dept   Date of Visit:  6/2/2017            [facility], Emergency Dept    [address]    [place] [address]    Phone:  [phone]      You were seen by     Riley Doran M.D.      Your Diagnosis Was     Migraine without status migrainosus, not intractable, unspecified migraine type     G43.909       These are the medications you received during your hospitalization from 06/02/2017 1633 to 06/02/2017 1935     Date/Time Order Dose Route Action    06/02/2017 1830 ketorolac (TORADOL) injection 30 mg 30 mg Intravenous Given    06/02/2017 1830 ondansetron (ZOFRAN) syringe/vial injection 4 mg 4 mg Intravenous Given    06/02/2017 1830 NS infusion 1,000 mL 1,000 mL Intravenous New Bag      Follow-up Information     1. Schedule an appointment as soon as possible for a visit with Humberto Herrera M.D..    Specialty:  OB/Gyn    Why:  return for worsening pain, numbness, weakness, uncontrollable vomiting or any concerns.    Contact information    [address] [place] [address] [address]  [place] [address]  [phone]        Medication Information     Review all of your home medications and newly ordered medications with your primary doctor and/or pharmacist as soon as possible. Follow medication instructions as directed by your doctor and/or pharmacist.     Please keep your complete medication list with you and share with your physician. Update the information when medications are discontinued, doses are changed, or new medications (including over-the-counter products) are added; and carry medication information at all times in the event of emergency situations.               Medication List      START taking these medications        Instructions    Morning Afternoon Evening Bedtime    ondansetron 4  MG Tbdp   Commonly known as:  ZOFRAN ODT        Take 1 Tab by mouth every 8 hours as needed for Nausea/Vomiting.   Dose:  4 mg                          ASK your doctor about these medications        Instructions    Morning Afternoon Evening Bedtime    ibuprofen 600 MG Tabs   Commonly known as:  MOTRIN        Take 1 Tab by mouth every 6 hours as needed (For cramping after delivery; do not give if patient is receiving ketorolac (Toradol)).   Dose:  600 mg                             Where to Get Your Medications      You can get these medications from any pharmacy     Bring a paper prescription for each of these medications    - ondansetron 4 MG Tbdp            Procedures and tests performed during your visit     BASIC METABOLIC PANEL    CT-HEAD W/O    ESTIMATED GFR    SALINE LOCK        Discharge Instructions       Migraine Headache  A migraine headache is an intense, throbbing pain on one or both sides of your head. A migraine can last for 30 minutes to several hours.  CAUSES   The exact cause of a migraine headache is not always known. However, a migraine may be caused when nerves in the brain become irritated and release chemicals that cause inflammation. This causes pain.  Certain things may also trigger migraines, such as:  · Alcohol.  · Smoking.  · Stress.  · Menstruation.  · Aged cheeses.  · Foods or drinks that contain nitrates, glutamate, aspartame, or tyramine.  · Lack of sleep.  · Chocolate.  · Caffeine.  · Hunger.  · Physical exertion.  · Fatigue.  · Medicines used to treat chest pain (nitroglycerine), birth control pills, estrogen, and some blood pressure medicines.  SIGNS AND SYMPTOMS  · Pain on one or both sides of your head.  · Pulsating or throbbing pain.  · Severe pain that prevents daily activities.  · Pain that is aggravated by any physical activity.  · Nausea, vomiting, or both.  · Dizziness.  · Pain with exposure to bright lights, loud noises, or activity.  · General sensitivity to bright  lights, loud noises, or smells.  Before you get a migraine, you may get warning signs that a migraine is coming (aura). An aura may include:  · Seeing flashing lights.  · Seeing bright spots, halos, or zigzag lines.  · Having tunnel vision or blurred vision.  · Having feelings of numbness or tingling.  · Having trouble talking.  · Having muscle weakness.  DIAGNOSIS   A migraine headache is often diagnosed based on:  · Symptoms.  · Physical exam.  · A CT scan or MRI of your head. These imaging tests cannot diagnose migraines, but they can help rule out other causes of headaches.  TREATMENT  Medicines may be given for pain and nausea. Medicines can also be given to help prevent recurrent migraines.   HOME CARE INSTRUCTIONS  · Only take over-the-counter or prescription medicines for pain or discomfort as directed by your health care provider. The use of long-term narcotics is not recommended.  · Lie down in a dark, quiet room when you have a migraine.  · Keep a journal to find out what may trigger your migraine headaches. For example, write down:  ¨ What you eat and drink.  ¨ How much sleep you get.  ¨ Any change to your diet or medicines.  · Limit alcohol consumption.  · Quit smoking if you smoke.  · Get 7-9 hours of sleep, or as recommended by your health care provider.  · Limit stress.  · Keep lights dim if bright lights bother you and make your migraines worse.  SEEK IMMEDIATE MEDICAL CARE IF:   · Your migraine becomes severe.  · You have a fever.  · You have a stiff neck.  · You have vision loss.  · You have muscular weakness or loss of muscle control.  · You start losing your balance or have trouble walking.  · You feel faint or pass out.  · You have severe symptoms that are different from your first symptoms.  MAKE SURE YOU:   · Understand these instructions.  · Will watch your condition.  · Will get help right away if you are not doing well or get worse.     This information is not intended to replace advice  given to you by your health care provider. Make sure you discuss any questions you have with your health care provider.     Document Released: 12/18/2006 Document Revised: 01/08/2016 Document Reviewed: 08/25/2014  Elsevier Interactive Patient Education ©2016 [employer]            Patient Information     Patient Information    Following emergency treatment: all patient requiring follow-up care must return either to a private physician or a clinic if your condition worsens before you are able to obtain further medical attention, please return to the emergency room.     Billing Information    At [facility], we work to make the billing process streamlined for our patients.  Our Representatives are here to answer any questions you may have regarding your hospital bill.  If you have insurance coverage and have supplied your insurance information to us, we will submit a claim to your insurer on your behalf.  Should you have any questions regarding your bill, we can be reached online or by phone as follows:  Online: You are able pay your bills online or live chat with our representatives about any billing questions you may have. We are here to help Monday - Friday from 8:00am to 7:30pm and 9:00am - 12:00pm on Saturdays.  Please visit [email or web address]  for more information.   Phone:  [phone] or [phone]    Please note that your emergency physician, surgeon, pathologist, radiologist, anesthesiologist, and other specialists are not employed by [facility] and will therefore bill separately for their services.  Please contact them directly for any questions concerning their bills at the numbers below:     Emergency Physician Services:  [phone]  [facility]:  [phone]  [facility]:  [phone]  [facility]:  [phone]    1. Your final bill may vary from the amount quoted upon discharge if all procedures are not  complete at that time, or if your doctor has additional procedures of which we are not aware. You will receive an additional bill if you return to the Emergency Department at [facility] for suture removal regardless of the facility of which the sutures were placed.     2. Please arrange for settlement of this account at the emergency registration.    3. All self-pay accounts are due in full at the time of treatment.  If you are unable to meet this obligation then payment is expected within 4-5 days.     4. If you have had radiology studies (CT, X-ray, Ultrasound, MRI), you have received a preliminary result during your emergency department visit. Please contact the radiology department [phone] to receive a copy of your final result. Please discuss the Final result with your primary physician or with the follow up physician provided.     Crisis Hotline:  [place] Crisis Hotline:  1-789-JWZVEPB or 1-775.503.8317  [place] Crisis Hotline:    1-436.807.2581 or 482-944-0566         ED Discharge Follow Up Questions    1. In order to provide you with very good care, we would like to follow up with a phone call in the next few days.  May we have your permission to contact you?     YES /  NO    2. What is the best phone number to call you? (       )_____-__________    3. What is the best time to call you?      Morning  /  Afternoon  /  Evening                   Patient Signature:  ____________________________________________________________    Date:  ____________________________________________________________

## 2017-06-03 NOTE — ED PROVIDER NOTES
ED Provider Note    CHIEF COMPLAINT  Chief Complaint   Patient presents with   • Headache     x1 hr while at work.  Pt reports frequent headaches since baby was born on March 23.    • Blurred Vision     Left eye.        HPI  Juana Child is a 21 y.o. female who presents with complaint of headache, bifrontal, radiating posteriorly.  She has slight blurred vision in her left eye with scintillation.  She was told she had migraines in the past but states she has never been formally diagnosed.  She is 2 months postpartum, breast-feeding her child.  No fever, no neck stiffness.  No trauma.  There is no visual loss.  Patient denies numbness or weakness to the extremities.  Current headache started today while at work.  She denies this is the worst headache of her life however states the visual changes new.  No history of diabetes.  Patient states she has never had neuro imaging.    REVIEW OF SYSTEMS  Neurologic: Headache  Eyes: Blurred vision left eye  Ear nose throat: No facial pain  Gastrointestinal: Nausea, no vomiting  Musculoskeletal: No neck pain or stiffness     All other systems are negative.        PAST MEDICAL HISTORY  Past Medical History   Diagnosis Date   • Anxiety 2013   • Depression 2013   • Migraine        FAMILY HISTORY  No family history on file.    SOCIAL HISTORY  Social History     Social History   • Marital Status: Single     Spouse Name: N/A   • Number of Children: N/A   • Years of Education: N/A     Social History Main Topics   • Smoking status: Former Smoker     Quit date: 05/26/2013   • Smokeless tobacco: None   • Alcohol Use: No      Comment: socially   • Drug Use: No      Comment: Last used 5/2013   • Sexual Activity:     Partners: Male      Comment: None     Other Topics Concern   • None     Social History Narrative       SURGICAL HISTORY  History reviewed. No pertinent past surgical history.    CURRENT MEDICATIONS  No current facility-administered medications on file prior to encounter.  "    Current Outpatient Prescriptions on File Prior to Encounter   Medication Sig Dispense Refill   • ibuprofen (MOTRIN) 600 MG Tab Take 1 Tab by mouth every 6 hours as needed (For cramping after delivery; do not give if patient is receiving ketorolac (Toradol)). 30 Tab 0       ALLERGIES  No Known Allergies    PHYSICAL EXAM  VITAL SIGNS: /75 mmHg  Pulse 69  Temp(Src) 36.9 °C (98.4 °F) (Temporal)  Resp 18  Ht 1.6 m (5' 3\")  Wt 84.6 kg (186 lb 8.2 oz)  BMI 33.05 kg/m2  SpO2 97%  LMP 05/24/2016  Breastfeeding? Unknown  Constitutional:  No acute distress, Non-toxic appearance.   HENT:Atraumatic, no epistaxis, posterior pharynx normal.  No scalp tenderness.  Eyes: Pupils are 3 mm bilateral, EOMI, Conjunctiva normal, No discharge.   Musculoskeletal: No cervical neck tenderness, neck is supple.   Lymphatic: No lymphadenopathy.   Cardiovascular: Normal heart rate, Normal rhythm, No murmurs, No rubs, No gallops.   Pulmonary: Normal breath sounds, No respiratory distress, No wheezing  Skin: Warm, Dry, No erythema, No rash.    Neurologic: Sensation and strength normal.  Speech clear.  Patient is alert and oriented  Psychiatric: Affect normal, Mood normal.     RADIOLOGY/PROCEDURES  CT-HEAD W/O   Final Result      Head CT without contrast within normal limits. No evidence of acute cerebral infarction, hemorrhage or mass lesion.        Results for orders placed or performed during the hospital encounter of 06/02/17   BASIC METABOLIC PANEL   Result Value Ref Range    Sodium 137 135 - 145 mmol/L    Potassium 4.3 3.6 - 5.5 mmol/L    Chloride 104 96 - 112 mmol/L    Co2 23 20 - 33 mmol/L    Glucose 83 65 - 99 mg/dL    Bun 13 8 - 22 mg/dL    Creatinine 0.56 0.50 - 1.40 mg/dL    Calcium 10.3 8.5 - 10.5 mg/dL    Anion Gap 10.0 0.0 - 11.9   ESTIMATED GFR   Result Value Ref Range    GFR If African American >60 >60 mL/min/1.73 m 2    GFR If Non African American >60 >60 mL/min/1.73 m 2         COURSE & MEDICAL DECISION " MAKING  Pertinent Labs & Imaging studies reviewed. (See chart for details)  Blurred vision, basic metabolic profile was obtained to rule out elevated blood sugar.  This also showed kidney function, good electrolytes.  Suspect tension headache versus migraine headache.  I would lean toward a migraine headache with the scintillating lights and blurred vision in the left eye.  Headache is now completely resolved after Toradol, IV fluid and Zofran.  Patient will be prescribed Zofran for nausea as needed, for pain she will use Tylenol or Motrin.  Fioricet was considered however the butalbital is excreted in the breast milk and mother wished to avoid.  She is asked to follow-up with primary doctor.  She is asked to return if worse.    FINAL IMPRESSION  1. Migraine without status migrainosus, not intractable, unspecified migraine type            Electronically signed by: Riley Doran, 6/2/2017 7:13 PM

## 2017-06-03 NOTE — DISCHARGE INSTRUCTIONS
Migraine Headache  A migraine headache is an intense, throbbing pain on one or both sides of your head. A migraine can last for 30 minutes to several hours.  CAUSES   The exact cause of a migraine headache is not always known. However, a migraine may be caused when nerves in the brain become irritated and release chemicals that cause inflammation. This causes pain.  Certain things may also trigger migraines, such as:  · Alcohol.  · Smoking.  · Stress.  · Menstruation.  · Aged cheeses.  · Foods or drinks that contain nitrates, glutamate, aspartame, or tyramine.  · Lack of sleep.  · Chocolate.  · Caffeine.  · Hunger.  · Physical exertion.  · Fatigue.  · Medicines used to treat chest pain (nitroglycerine), birth control pills, estrogen, and some blood pressure medicines.  SIGNS AND SYMPTOMS  · Pain on one or both sides of your head.  · Pulsating or throbbing pain.  · Severe pain that prevents daily activities.  · Pain that is aggravated by any physical activity.  · Nausea, vomiting, or both.  · Dizziness.  · Pain with exposure to bright lights, loud noises, or activity.  · General sensitivity to bright lights, loud noises, or smells.  Before you get a migraine, you may get warning signs that a migraine is coming (aura). An aura may include:  · Seeing flashing lights.  · Seeing bright spots, halos, or zigzag lines.  · Having tunnel vision or blurred vision.  · Having feelings of numbness or tingling.  · Having trouble talking.  · Having muscle weakness.  DIAGNOSIS   A migraine headache is often diagnosed based on:  · Symptoms.  · Physical exam.  · A CT scan or MRI of your head. These imaging tests cannot diagnose migraines, but they can help rule out other causes of headaches.  TREATMENT  Medicines may be given for pain and nausea. Medicines can also be given to help prevent recurrent migraines.   HOME CARE INSTRUCTIONS  · Only take over-the-counter or prescription medicines for pain or discomfort as directed by your  health care provider. The use of long-term narcotics is not recommended.  · Lie down in a dark, quiet room when you have a migraine.  · Keep a journal to find out what may trigger your migraine headaches. For example, write down:  ¨ What you eat and drink.  ¨ How much sleep you get.  ¨ Any change to your diet or medicines.  · Limit alcohol consumption.  · Quit smoking if you smoke.  · Get 7-9 hours of sleep, or as recommended by your health care provider.  · Limit stress.  · Keep lights dim if bright lights bother you and make your migraines worse.  SEEK IMMEDIATE MEDICAL CARE IF:   · Your migraine becomes severe.  · You have a fever.  · You have a stiff neck.  · You have vision loss.  · You have muscular weakness or loss of muscle control.  · You start losing your balance or have trouble walking.  · You feel faint or pass out.  · You have severe symptoms that are different from your first symptoms.  MAKE SURE YOU:   · Understand these instructions.  · Will watch your condition.  · Will get help right away if you are not doing well or get worse.     This information is not intended to replace advice given to you by your health care provider. Make sure you discuss any questions you have with your health care provider.     Document Released: 12/18/2006 Document Revised: 01/08/2016 Document Reviewed: 08/25/2014  [employer] Interactive Patient Education ©2016 [employer]

## 2019-09-08 ENCOUNTER — OFFICE VISIT (OUTPATIENT)
Dept: URGENT CARE | Facility: CLINIC | Age: 24
End: 2019-09-08
Payer: MEDICAID

## 2019-09-08 ENCOUNTER — HOSPITAL ENCOUNTER (OUTPATIENT)
Facility: MEDICAL CENTER | Age: 24
End: 2019-09-08
Attending: NURSE PRACTITIONER
Payer: MEDICAID

## 2019-09-08 VITALS
WEIGHT: 179 LBS | OXYGEN SATURATION: 99 % | SYSTOLIC BLOOD PRESSURE: 122 MMHG | RESPIRATION RATE: 16 BRPM | DIASTOLIC BLOOD PRESSURE: 68 MMHG | TEMPERATURE: 98.3 F | BODY MASS INDEX: 31.71 KG/M2 | HEIGHT: 63 IN | HEART RATE: 71 BPM

## 2019-09-08 DIAGNOSIS — R81 GLUCOSURIA: ICD-10-CM

## 2019-09-08 DIAGNOSIS — N30.01 ACUTE CYSTITIS WITH HEMATURIA: ICD-10-CM

## 2019-09-08 LAB
APPEARANCE UR: CLEAR
BILIRUB UR STRIP-MCNC: NEGATIVE MG/DL
COLOR UR AUTO: NORMAL
FORWARD REASON: SPWHY: NORMAL
FORWARDED TO LAB: SPWHR: NORMAL
GLUCOSE BLD-MCNC: 102 MG/DL (ref 70–100)
GLUCOSE UR STRIP.AUTO-MCNC: 100 MG/DL
INT CON NEG: NEGATIVE
INT CON POS: POSITIVE
KETONES UR STRIP.AUTO-MCNC: NEGATIVE MG/DL
LEUKOCYTE ESTERASE UR QL STRIP.AUTO: NORMAL
NITRITE UR QL STRIP.AUTO: POSITIVE
PH UR STRIP.AUTO: 5 [PH] (ref 5–8)
POC URINE PREGNANCY TEST: NEGATIVE
PROT UR QL STRIP: NEGATIVE MG/DL
RBC UR QL AUTO: NORMAL
SP GR UR STRIP.AUTO: 1.01
SPECIMEN SENT: SPWT1: NORMAL
UROBILINOGEN UR STRIP-MCNC: 1 MG/DL

## 2019-09-08 PROCEDURE — 99204 OFFICE O/P NEW MOD 45 MIN: CPT | Performed by: NURSE PRACTITIONER

## 2019-09-08 PROCEDURE — 81002 URINALYSIS NONAUTO W/O SCOPE: CPT | Performed by: NURSE PRACTITIONER

## 2019-09-08 PROCEDURE — 81025 URINE PREGNANCY TEST: CPT | Performed by: NURSE PRACTITIONER

## 2019-09-08 PROCEDURE — 99000 SPECIMEN HANDLING OFFICE-LAB: CPT | Performed by: NURSE PRACTITIONER

## 2019-09-08 PROCEDURE — 82962 GLUCOSE BLOOD TEST: CPT | Performed by: NURSE PRACTITIONER

## 2019-09-08 RX ORDER — NITROFURANTOIN 25; 75 MG/1; MG/1
100 CAPSULE ORAL EVERY 12 HOURS
Qty: 10 CAP | Refills: 0 | Status: SHIPPED | OUTPATIENT
Start: 2019-09-08 | End: 2019-09-13

## 2019-09-08 NOTE — PROGRESS NOTES
Chief Complaint   Patient presents with   • UTI     xtoday, blood in urine, frequency, urgency, bladder pressure and burning sensation       HISTORY OF PRESENT ILLNESS: Patient is a 24 y.o. female who presents today due to two days of urinary burning, urgency, hematuria, and frequency. Reports some associated pelvic pressure. Denies  fever, flank pain, N/V. Denies a history of kidney stones. Admits to distant history of pyelonephritis.  Also admits to a history of UTIs, last one was two years ago.     Patient Active Problem List    Diagnosis Date Noted   • Indication for care in labor or delivery 2014       Allergies:Patient has no known allergies.    Current Outpatient Medications Ordered in Epic   Medication Sig Dispense Refill   • nitrofurantoin monohyd macro (MACROBID) 100 MG Cap Take 1 Cap by mouth every 12 hours for 5 days. 10 Cap 0   • ondansetron (ZOFRAN ODT) 4 MG TABLET DISPERSIBLE Take 1 Tab by mouth every 8 hours as needed for Nausea/Vomiting. 10 Tab 1   • ibuprofen (MOTRIN) 600 MG Tab Take 1 Tab by mouth every 6 hours as needed (For cramping after delivery; do not give if patient is receiving ketorolac (Toradol)). 30 Tab 0     No current Epic-ordered facility-administered medications on file.        Past Medical History:   Diagnosis Date   • Anxiety    • Depression    • Migraine        Social History     Tobacco Use   • Smoking status: Former Smoker     Last attempt to quit: 2013     Years since quittin.2   • Smokeless tobacco: Never Used   Substance Use Topics   • Alcohol use: No     Comment: socially   • Drug use: No     Types: Methamphetamines     Comment: Last used 2013       Family Status   Relation Name Status   • Mo  Alive   • Fa  Alive   • Sis 4 Alive   • Bro 1 Alive   • MGMo  Alive   • MGFa     • PGMo     • PGFa     History reviewed. No pertinent family history.    ROS:  Review of Systems   Constitutional: Negative for fever, chills, weight loss  "and malaise/fatigue.   HENT: Negative for ear pain, nosebleeds, congestion, sore throat and neck pain.    Eyes: Negative for vision changes.   Neuro: Negative for headache, sensory changes, weakness, seizure, LOC.   Cardiovascular: Negative for chest pain, palpitations, orthopnea and leg swelling.   Respiratory: Negative for cough, sputum production, shortness of breath and wheezing.   Gastrointestinal: Positive for lower abdominal pressure. Negative for abdominal pain, nausea, vomiting or diarrhea.   Genitourinary: Positive for dysuria, urgency and frequency. Negative for hematuria or flank pain.   Skin: Negative for rash, diaphoresis.     Exam:  /68   Pulse 71   Temp 36.8 °C (98.3 °F) (Temporal)   Resp 16   Ht 1.6 m (5' 3\")   Wt 81.2 kg (179 lb)   SpO2 99%   General: well-nourished, well-developed female in NAD  Head: normocephalic, atraumatic  Eyes: PERRLA, no conjunctival injection, acuity grossly intact, lids normal.  Lymph: no cervical adenopathy. No supraclavicular adenopathy.   Neuro: alert and oriented. Cranial nerves 1-12 grossly intact. No sensory deficit.   Cardiovascular: regular rate and rhythm. No edema.  Pulmonary: no distress. Chest is symmetrical with respiration, no wheezes, crackles, or rhonchi.   Abdomen: soft, non-tender, no guarding, no hepatosplenomegaly. No CVA tenderness.   Musculoskeletal: no clubbing, appropriate muscle tone, gait is stable.  Skin: warm, dry, intact, no clubbing, no cyanosis, no rashes.   Psych: appropriate mood, affect, judgement.         Assessment/Plan:  1. Acute cystitis with hematuria  POCT Urinalysis    POCT PREGNANCY    Urine Culture    nitrofurantoin monohyd macro (MACROBID) 100 MG Cap   2. Glucosuria  POCT Glucose         Antibiotic as directed. Increase fluid intake. Urine sent for culture.   Urine dip positive for glucose, FSBS in office 102, please follow up with PCP regarding such.   Supportive care, differential diagnoses, and indications for " immediate follow-up discussed with patient.   Pathogenesis of diagnosis discussed including typical length and natural progression.   Instructed to return to clinic or nearest emergency department for any change in condition, further concerns, or worsening of symptoms.  Patient states understanding of the plan of care and discharge instructions.  Instructed to make an appointment, for follow up, with her primary care provider.        Please note that this dictation was created using voice recognition software. I have made every reasonable attempt to correct obvious errors, but I expect that there are errors of grammar and possibly content that I did not discover before finalizing the note.      ARELI Cox.

## 2019-09-12 ENCOUNTER — TELEPHONE (OUTPATIENT)
Dept: URGENT CARE | Facility: PHYSICIAN GROUP | Age: 24
End: 2019-09-12

## 2019-09-12 NOTE — TELEPHONE ENCOUNTER
The patient was called for re-evaluation, urine culture received from [facility], positive for E coli with sensitivity to Macrobid, patient reports improvement, continue abx therapy, encouraged to call back to the clinic or return to clinic with any questions or concerns.       ARELI Cox.

## 2019-11-13 NOTE — PROGRESS NOTES
POST PARTUM DAY # 2  The patient says that she feels fine and that she has no problems or complaints.   The patient's vital signs are stable and she is afebrile.   She appears well developed and well nourished and relaxed and alert and comfortable and in no apparent distress.   Will discharge home today.   Rx's for ibuprofen and iron sulfate and stool softener.   Return to office in about 6 weeks for a post partum visit.   Humberto Herrera M.D.    Detail Level: Generalized Detail Level: Simple

## 2020-10-13 ENCOUNTER — ANESTHESIA (OUTPATIENT)
Dept: SURGERY | Facility: MEDICAL CENTER | Age: 25
End: 2020-10-13
Payer: COMMERCIAL

## 2020-10-13 ENCOUNTER — ANESTHESIA EVENT (OUTPATIENT)
Dept: SURGERY | Facility: MEDICAL CENTER | Age: 25
End: 2020-10-13
Payer: COMMERCIAL

## 2020-10-13 ENCOUNTER — HOSPITAL ENCOUNTER (EMERGENCY)
Facility: MEDICAL CENTER | Age: 25
End: 2020-10-13
Attending: EMERGENCY MEDICINE | Admitting: SURGERY
Payer: COMMERCIAL

## 2020-10-13 ENCOUNTER — APPOINTMENT (OUTPATIENT)
Dept: RADIOLOGY | Facility: MEDICAL CENTER | Age: 25
End: 2020-10-13
Attending: EMERGENCY MEDICINE
Payer: COMMERCIAL

## 2020-10-13 VITALS
BODY MASS INDEX: 31.72 KG/M2 | HEIGHT: 63 IN | HEART RATE: 81 BPM | TEMPERATURE: 97 F | OXYGEN SATURATION: 100 % | DIASTOLIC BLOOD PRESSURE: 66 MMHG | WEIGHT: 179.01 LBS | RESPIRATION RATE: 16 BRPM | SYSTOLIC BLOOD PRESSURE: 108 MMHG

## 2020-10-13 DIAGNOSIS — K80.01 CALCULUS OF GALLBLADDER WITH ACUTE CHOLECYSTITIS AND OBSTRUCTION: ICD-10-CM

## 2020-10-13 PROBLEM — K80.12 CALCULUS OF GALLBLADDER WITH ACUTE AND CHRONIC CHOLECYSTITIS WITHOUT OBSTRUCTION: Status: ACTIVE | Noted: 2020-10-13

## 2020-10-13 LAB
ALBUMIN SERPL BCP-MCNC: 4.2 G/DL (ref 3.2–4.9)
ALBUMIN/GLOB SERPL: 1.4 G/DL
ALP SERPL-CCNC: 65 U/L (ref 30–99)
ALT SERPL-CCNC: 23 U/L (ref 2–50)
ANION GAP SERPL CALC-SCNC: 10 MMOL/L (ref 7–16)
AST SERPL-CCNC: 18 U/L (ref 12–45)
BASOPHILS # BLD AUTO: 0.4 % (ref 0–1.8)
BASOPHILS # BLD: 0.03 K/UL (ref 0–0.12)
BILIRUB SERPL-MCNC: 0.3 MG/DL (ref 0.1–1.5)
BUN SERPL-MCNC: 13 MG/DL (ref 8–22)
CALCIUM SERPL-MCNC: 8.8 MG/DL (ref 8.5–10.5)
CHLORIDE SERPL-SCNC: 105 MMOL/L (ref 96–112)
CO2 SERPL-SCNC: 22 MMOL/L (ref 20–33)
COVID ORDER STATUS COVID19: NORMAL
CREAT SERPL-MCNC: 0.49 MG/DL (ref 0.5–1.4)
EOSINOPHIL # BLD AUTO: 0.22 K/UL (ref 0–0.51)
EOSINOPHIL NFR BLD: 2.6 % (ref 0–6.9)
ERYTHROCYTE [DISTWIDTH] IN BLOOD BY AUTOMATED COUNT: 43 FL (ref 35.9–50)
GLOBULIN SER CALC-MCNC: 2.9 G/DL (ref 1.9–3.5)
GLUCOSE SERPL-MCNC: 91 MG/DL (ref 65–99)
HCG SERPL QL: NEGATIVE
HCT VFR BLD AUTO: 38 % (ref 37–47)
HGB BLD-MCNC: 12.3 G/DL (ref 12–16)
IMM GRANULOCYTES # BLD AUTO: 0.03 K/UL (ref 0–0.11)
IMM GRANULOCYTES NFR BLD AUTO: 0.4 % (ref 0–0.9)
LIPASE SERPL-CCNC: 24 U/L (ref 11–82)
LYMPHOCYTES # BLD AUTO: 3.19 K/UL (ref 1–4.8)
LYMPHOCYTES NFR BLD: 37.8 % (ref 22–41)
MCH RBC QN AUTO: 28.3 PG (ref 27–33)
MCHC RBC AUTO-ENTMCNC: 32.4 G/DL (ref 33.6–35)
MCV RBC AUTO: 87.4 FL (ref 81.4–97.8)
MONOCYTES # BLD AUTO: 0.47 K/UL (ref 0–0.85)
MONOCYTES NFR BLD AUTO: 5.6 % (ref 0–13.4)
NEUTROPHILS # BLD AUTO: 4.49 K/UL (ref 2–7.15)
NEUTROPHILS NFR BLD: 53.2 % (ref 44–72)
NRBC # BLD AUTO: 0 K/UL
NRBC BLD-RTO: 0 /100 WBC
PATHOLOGY CONSULT NOTE: NORMAL
PLATELET # BLD AUTO: 243 K/UL (ref 164–446)
PMV BLD AUTO: 10.2 FL (ref 9–12.9)
POTASSIUM SERPL-SCNC: 3.9 MMOL/L (ref 3.6–5.5)
PROT SERPL-MCNC: 7.1 G/DL (ref 6–8.2)
RBC # BLD AUTO: 4.35 M/UL (ref 4.2–5.4)
SARS-COV+SARS-COV-2 AG RESP QL IA.RAPID: NOTDETECTED
SODIUM SERPL-SCNC: 137 MMOL/L (ref 135–145)
SPECIMEN SOURCE: NORMAL
WBC # BLD AUTO: 8.4 K/UL (ref 4.8–10.8)

## 2020-10-13 PROCEDURE — 160025 RECOVERY II MINUTES (STATS): Performed by: SURGERY

## 2020-10-13 PROCEDURE — 96374 THER/PROPH/DIAG INJ IV PUSH: CPT | Mod: XU

## 2020-10-13 PROCEDURE — 502571 HCHG PACK, LAP CHOLE: Performed by: SURGERY

## 2020-10-13 PROCEDURE — 700101 HCHG RX REV CODE 250: Performed by: SURGERY

## 2020-10-13 PROCEDURE — 160048 HCHG OR STATISTICAL LEVEL 1-5: Performed by: SURGERY

## 2020-10-13 PROCEDURE — 160009 HCHG ANES TIME/MIN: Performed by: SURGERY

## 2020-10-13 PROCEDURE — 76705 ECHO EXAM OF ABDOMEN: CPT

## 2020-10-13 PROCEDURE — 160041 HCHG SURGERY MINUTES - EA ADDL 1 MIN LEVEL 4: Performed by: SURGERY

## 2020-10-13 PROCEDURE — 85025 COMPLETE CBC W/AUTO DIFF WBC: CPT

## 2020-10-13 PROCEDURE — 700101 HCHG RX REV CODE 250: Performed by: STUDENT IN AN ORGANIZED HEALTH CARE EDUCATION/TRAINING PROGRAM

## 2020-10-13 PROCEDURE — 700105 HCHG RX REV CODE 258: Performed by: EMERGENCY MEDICINE

## 2020-10-13 PROCEDURE — 501838 HCHG SUTURE GENERAL: Performed by: SURGERY

## 2020-10-13 PROCEDURE — 501568 HCHG TROCAR, BLUNTPORT 12MM: Performed by: SURGERY

## 2020-10-13 PROCEDURE — 700105 HCHG RX REV CODE 258: Performed by: STUDENT IN AN ORGANIZED HEALTH CARE EDUCATION/TRAINING PROGRAM

## 2020-10-13 PROCEDURE — 700111 HCHG RX REV CODE 636 W/ 250 OVERRIDE (IP): Performed by: STUDENT IN AN ORGANIZED HEALTH CARE EDUCATION/TRAINING PROGRAM

## 2020-10-13 PROCEDURE — 160047 HCHG PACU  - EA ADDL 30 MINS PHASE II: Performed by: SURGERY

## 2020-10-13 PROCEDURE — 83690 ASSAY OF LIPASE: CPT

## 2020-10-13 PROCEDURE — 88304 TISSUE EXAM BY PATHOLOGIST: CPT

## 2020-10-13 PROCEDURE — 501583 HCHG TROCAR, THRD CAN&SEAL 5X100: Performed by: SURGERY

## 2020-10-13 PROCEDURE — 160002 HCHG RECOVERY MINUTES (STAT): Performed by: SURGERY

## 2020-10-13 PROCEDURE — 501577 HCHG TROCAR, STEP 11MM: Performed by: SURGERY

## 2020-10-13 PROCEDURE — 160029 HCHG SURGERY MINUTES - 1ST 30 MINS LEVEL 4: Performed by: SURGERY

## 2020-10-13 PROCEDURE — 700101 HCHG RX REV CODE 250: Performed by: EMERGENCY MEDICINE

## 2020-10-13 PROCEDURE — 501570 HCHG TROCAR, SEPARATOR: Performed by: SURGERY

## 2020-10-13 PROCEDURE — 501399 HCHG SPECIMAN BAG, ENDO CATC: Performed by: SURGERY

## 2020-10-13 PROCEDURE — C9803 HOPD COVID-19 SPEC COLLECT: HCPCS | Performed by: SURGERY

## 2020-10-13 PROCEDURE — A9270 NON-COVERED ITEM OR SERVICE: HCPCS | Performed by: STUDENT IN AN ORGANIZED HEALTH CARE EDUCATION/TRAINING PROGRAM

## 2020-10-13 PROCEDURE — 160035 HCHG PACU - 1ST 60 MINS PHASE I: Performed by: SURGERY

## 2020-10-13 PROCEDURE — 87426 SARSCOV CORONAVIRUS AG IA: CPT

## 2020-10-13 PROCEDURE — 96375 TX/PRO/DX INJ NEW DRUG ADDON: CPT | Mod: XU

## 2020-10-13 PROCEDURE — 160036 HCHG PACU - EA ADDL 30 MINS PHASE I: Performed by: SURGERY

## 2020-10-13 PROCEDURE — 500002 HCHG ADHESIVE, DERMABOND: Performed by: SURGERY

## 2020-10-13 PROCEDURE — 80053 COMPREHEN METABOLIC PANEL: CPT

## 2020-10-13 PROCEDURE — 99291 CRITICAL CARE FIRST HOUR: CPT

## 2020-10-13 PROCEDURE — 84703 CHORIONIC GONADOTROPIN ASSAY: CPT

## 2020-10-13 PROCEDURE — 700111 HCHG RX REV CODE 636 W/ 250 OVERRIDE (IP): Performed by: EMERGENCY MEDICINE

## 2020-10-13 PROCEDURE — 160046 HCHG PACU - 1ST 60 MINS PHASE II: Performed by: SURGERY

## 2020-10-13 PROCEDURE — 700102 HCHG RX REV CODE 250 W/ 637 OVERRIDE(OP): Performed by: STUDENT IN AN ORGANIZED HEALTH CARE EDUCATION/TRAINING PROGRAM

## 2020-10-13 RX ORDER — DIPHENHYDRAMINE HYDROCHLORIDE 50 MG/ML
12.5 INJECTION INTRAMUSCULAR; INTRAVENOUS
Status: DISCONTINUED | OUTPATIENT
Start: 2020-10-13 | End: 2020-10-13 | Stop reason: HOSPADM

## 2020-10-13 RX ORDER — OXYCODONE HCL 5 MG/5 ML
10 SOLUTION, ORAL ORAL
Status: COMPLETED | OUTPATIENT
Start: 2020-10-13 | End: 2020-10-13

## 2020-10-13 RX ORDER — HYDROMORPHONE HYDROCHLORIDE 1 MG/ML
0.1 INJECTION, SOLUTION INTRAMUSCULAR; INTRAVENOUS; SUBCUTANEOUS
Status: DISCONTINUED | OUTPATIENT
Start: 2020-10-13 | End: 2020-10-13 | Stop reason: HOSPADM

## 2020-10-13 RX ORDER — ROCURONIUM BROMIDE 10 MG/ML
INJECTION, SOLUTION INTRAVENOUS PRN
Status: DISCONTINUED | OUTPATIENT
Start: 2020-10-13 | End: 2020-10-13 | Stop reason: SURG

## 2020-10-13 RX ORDER — OXYCODONE HYDROCHLORIDE 5 MG/1
5 TABLET ORAL EVERY 6 HOURS PRN
Qty: 16 TAB | Refills: 0 | Status: SHIPPED | OUTPATIENT
Start: 2020-10-13 | End: 2020-10-18

## 2020-10-13 RX ORDER — MIDAZOLAM HYDROCHLORIDE 1 MG/ML
INJECTION INTRAMUSCULAR; INTRAVENOUS PRN
Status: DISCONTINUED | OUTPATIENT
Start: 2020-10-13 | End: 2020-10-13 | Stop reason: SURG

## 2020-10-13 RX ORDER — HYDROMORPHONE HYDROCHLORIDE 1 MG/ML
0.4 INJECTION, SOLUTION INTRAMUSCULAR; INTRAVENOUS; SUBCUTANEOUS
Status: DISCONTINUED | OUTPATIENT
Start: 2020-10-13 | End: 2020-10-13 | Stop reason: HOSPADM

## 2020-10-13 RX ORDER — ONDANSETRON 2 MG/ML
4 INJECTION INTRAMUSCULAR; INTRAVENOUS ONCE
Status: COMPLETED | OUTPATIENT
Start: 2020-10-13 | End: 2020-10-13

## 2020-10-13 RX ORDER — KETOROLAC TROMETHAMINE 30 MG/ML
30 INJECTION, SOLUTION INTRAMUSCULAR; INTRAVENOUS ONCE
Status: COMPLETED | OUTPATIENT
Start: 2020-10-13 | End: 2020-10-13

## 2020-10-13 RX ORDER — ONDANSETRON 2 MG/ML
INJECTION INTRAMUSCULAR; INTRAVENOUS PRN
Status: DISCONTINUED | OUTPATIENT
Start: 2020-10-13 | End: 2020-10-13 | Stop reason: SURG

## 2020-10-13 RX ORDER — OXYCODONE HYDROCHLORIDE 5 MG/1
5 TABLET ORAL EVERY 6 HOURS PRN
Status: DISCONTINUED | OUTPATIENT
Start: 2020-10-13 | End: 2020-10-13 | Stop reason: HOSPADM

## 2020-10-13 RX ORDER — HALOPERIDOL 5 MG/ML
1 INJECTION INTRAMUSCULAR
Status: DISCONTINUED | OUTPATIENT
Start: 2020-10-13 | End: 2020-10-13 | Stop reason: HOSPADM

## 2020-10-13 RX ORDER — HYDROMORPHONE HYDROCHLORIDE 1 MG/ML
0.5 INJECTION, SOLUTION INTRAMUSCULAR; INTRAVENOUS; SUBCUTANEOUS ONCE
Status: COMPLETED | OUTPATIENT
Start: 2020-10-13 | End: 2020-10-13

## 2020-10-13 RX ORDER — OXYCODONE HCL 5 MG/5 ML
5 SOLUTION, ORAL ORAL
Status: COMPLETED | OUTPATIENT
Start: 2020-10-13 | End: 2020-10-13

## 2020-10-13 RX ORDER — CEFAZOLIN SODIUM 1 G/3ML
INJECTION, POWDER, FOR SOLUTION INTRAMUSCULAR; INTRAVENOUS PRN
Status: DISCONTINUED | OUTPATIENT
Start: 2020-10-13 | End: 2020-10-13 | Stop reason: SURG

## 2020-10-13 RX ORDER — PHENYLEPHRINE HYDROCHLORIDE 10 MG/ML
INJECTION, SOLUTION INTRAMUSCULAR; INTRAVENOUS; SUBCUTANEOUS PRN
Status: DISCONTINUED | OUTPATIENT
Start: 2020-10-13 | End: 2020-10-13 | Stop reason: SURG

## 2020-10-13 RX ORDER — SODIUM CHLORIDE, SODIUM LACTATE, POTASSIUM CHLORIDE, CALCIUM CHLORIDE 600; 310; 30; 20 MG/100ML; MG/100ML; MG/100ML; MG/100ML
INJECTION, SOLUTION INTRAVENOUS CONTINUOUS
Status: DISCONTINUED | OUTPATIENT
Start: 2020-10-13 | End: 2020-10-13 | Stop reason: HOSPADM

## 2020-10-13 RX ORDER — HYDROMORPHONE HYDROCHLORIDE 1 MG/ML
0.2 INJECTION, SOLUTION INTRAMUSCULAR; INTRAVENOUS; SUBCUTANEOUS
Status: DISCONTINUED | OUTPATIENT
Start: 2020-10-13 | End: 2020-10-13 | Stop reason: HOSPADM

## 2020-10-13 RX ORDER — DEXAMETHASONE SODIUM PHOSPHATE 4 MG/ML
INJECTION, SOLUTION INTRA-ARTICULAR; INTRALESIONAL; INTRAMUSCULAR; INTRAVENOUS; SOFT TISSUE PRN
Status: DISCONTINUED | OUTPATIENT
Start: 2020-10-13 | End: 2020-10-13 | Stop reason: SURG

## 2020-10-13 RX ORDER — ACETAMINOPHEN 500 MG
1000 TABLET ORAL EVERY 6 HOURS PRN
COMMUNITY
End: 2021-01-24

## 2020-10-13 RX ORDER — HYDROMORPHONE HYDROCHLORIDE 2 MG/ML
INJECTION, SOLUTION INTRAMUSCULAR; INTRAVENOUS; SUBCUTANEOUS PRN
Status: DISCONTINUED | OUTPATIENT
Start: 2020-10-13 | End: 2020-10-13 | Stop reason: SURG

## 2020-10-13 RX ORDER — ONDANSETRON 2 MG/ML
4 INJECTION INTRAMUSCULAR; INTRAVENOUS
Status: COMPLETED | OUTPATIENT
Start: 2020-10-13 | End: 2020-10-13

## 2020-10-13 RX ORDER — SODIUM CHLORIDE, SODIUM LACTATE, POTASSIUM CHLORIDE, CALCIUM CHLORIDE 600; 310; 30; 20 MG/100ML; MG/100ML; MG/100ML; MG/100ML
INJECTION, SOLUTION INTRAVENOUS
Status: DISCONTINUED | OUTPATIENT
Start: 2020-10-13 | End: 2020-10-13 | Stop reason: SURG

## 2020-10-13 RX ORDER — BUPIVACAINE HYDROCHLORIDE AND EPINEPHRINE 5; 5 MG/ML; UG/ML
INJECTION, SOLUTION EPIDURAL; INTRACAUDAL; PERINEURAL
Status: DISCONTINUED | OUTPATIENT
Start: 2020-10-13 | End: 2020-10-13 | Stop reason: HOSPADM

## 2020-10-13 RX ORDER — KETOROLAC TROMETHAMINE 30 MG/ML
INJECTION, SOLUTION INTRAMUSCULAR; INTRAVENOUS PRN
Status: DISCONTINUED | OUTPATIENT
Start: 2020-10-13 | End: 2020-10-13 | Stop reason: SURG

## 2020-10-13 RX ORDER — LIDOCAINE HYDROCHLORIDE 20 MG/ML
INJECTION, SOLUTION EPIDURAL; INFILTRATION; INTRACAUDAL; PERINEURAL PRN
Status: DISCONTINUED | OUTPATIENT
Start: 2020-10-13 | End: 2020-10-13 | Stop reason: SURG

## 2020-10-13 RX ADMIN — METRONIDAZOLE 500 MG: 500 INJECTION, SOLUTION INTRAVENOUS at 09:24

## 2020-10-13 RX ADMIN — CEFTRIAXONE SODIUM 1 G: 1 INJECTION, POWDER, FOR SOLUTION INTRAMUSCULAR; INTRAVENOUS at 08:24

## 2020-10-13 RX ADMIN — ONDANSETRON 4 MG: 2 INJECTION INTRAMUSCULAR; INTRAVENOUS at 11:35

## 2020-10-13 RX ADMIN — FENTANYL CITRATE 25 MCG: 50 INJECTION, SOLUTION INTRAMUSCULAR; INTRAVENOUS at 11:33

## 2020-10-13 RX ADMIN — DEXAMETHASONE SODIUM PHOSPHATE 8 MG: 4 INJECTION, SOLUTION INTRA-ARTICULAR; INTRALESIONAL; INTRAMUSCULAR; INTRAVENOUS; SOFT TISSUE at 10:24

## 2020-10-13 RX ADMIN — OXYCODONE HYDROCHLORIDE 10 MG: 5 SOLUTION ORAL at 11:22

## 2020-10-13 RX ADMIN — PROPOFOL 200 MG: 10 INJECTION, EMULSION INTRAVENOUS at 10:24

## 2020-10-13 RX ADMIN — MIDAZOLAM HYDROCHLORIDE 2 MG: 1 INJECTION, SOLUTION INTRAMUSCULAR; INTRAVENOUS at 10:20

## 2020-10-13 RX ADMIN — PHENYLEPHRINE HYDROCHLORIDE 200 MCG: 10 INJECTION INTRAVENOUS at 10:41

## 2020-10-13 RX ADMIN — KETOROLAC TROMETHAMINE 30 MG: 30 INJECTION, SOLUTION INTRAMUSCULAR at 10:57

## 2020-10-13 RX ADMIN — SODIUM CHLORIDE, POTASSIUM CHLORIDE, SODIUM LACTATE AND CALCIUM CHLORIDE: 600; 310; 30; 20 INJECTION, SOLUTION INTRAVENOUS at 10:18

## 2020-10-13 RX ADMIN — HYDROMORPHONE HYDROCHLORIDE 0.4 MG: 1 INJECTION, SOLUTION INTRAMUSCULAR; INTRAVENOUS; SUBCUTANEOUS at 11:46

## 2020-10-13 RX ADMIN — HYDROMORPHONE HYDROCHLORIDE 0.4 MG: 1 INJECTION, SOLUTION INTRAMUSCULAR; INTRAVENOUS; SUBCUTANEOUS at 11:38

## 2020-10-13 RX ADMIN — HYDROMORPHONE HYDROCHLORIDE 1 MG: 2 INJECTION, SOLUTION INTRAMUSCULAR; INTRAVENOUS; SUBCUTANEOUS at 10:50

## 2020-10-13 RX ADMIN — ONDANSETRON 4 MG: 2 INJECTION INTRAMUSCULAR; INTRAVENOUS at 10:24

## 2020-10-13 RX ADMIN — HALOPERIDOL LACTATE 1 MG: 5 INJECTION, SOLUTION INTRAMUSCULAR at 12:30

## 2020-10-13 RX ADMIN — HYDROMORPHONE HYDROCHLORIDE 0.2 MG: 1 INJECTION, SOLUTION INTRAMUSCULAR; INTRAVENOUS; SUBCUTANEOUS at 11:51

## 2020-10-13 RX ADMIN — CEFAZOLIN 2 G: 330 INJECTION, POWDER, FOR SOLUTION INTRAMUSCULAR; INTRAVENOUS at 10:27

## 2020-10-13 RX ADMIN — KETOROLAC TROMETHAMINE 30 MG: 30 INJECTION, SOLUTION INTRAMUSCULAR; INTRAVENOUS at 07:05

## 2020-10-13 RX ADMIN — FENTANYL CITRATE 50 MCG: 50 INJECTION, SOLUTION INTRAMUSCULAR; INTRAVENOUS at 11:29

## 2020-10-13 RX ADMIN — SODIUM CHLORIDE, POTASSIUM CHLORIDE, SODIUM LACTATE AND CALCIUM CHLORIDE: 600; 310; 30; 20 INJECTION, SOLUTION INTRAVENOUS at 11:41

## 2020-10-13 RX ADMIN — PHENYLEPHRINE HYDROCHLORIDE 300 MCG: 10 INJECTION INTRAVENOUS at 10:43

## 2020-10-13 RX ADMIN — SUGAMMADEX 200 MG: 100 INJECTION, SOLUTION INTRAVENOUS at 10:56

## 2020-10-13 RX ADMIN — FENTANYL CITRATE 100 MCG: 50 INJECTION, SOLUTION INTRAMUSCULAR; INTRAVENOUS at 10:22

## 2020-10-13 RX ADMIN — LIDOCAINE HYDROCHLORIDE 100 MG: 20 INJECTION, SOLUTION EPIDURAL; INFILTRATION; INTRACAUDAL at 10:24

## 2020-10-13 RX ADMIN — ONDANSETRON 4 MG: 2 INJECTION INTRAMUSCULAR; INTRAVENOUS at 06:18

## 2020-10-13 RX ADMIN — FENTANYL CITRATE 25 MCG: 50 INJECTION, SOLUTION INTRAMUSCULAR; INTRAVENOUS at 11:24

## 2020-10-13 RX ADMIN — ROCURONIUM BROMIDE 50 MG: 10 INJECTION, SOLUTION INTRAVENOUS at 10:24

## 2020-10-13 ASSESSMENT — PAIN DESCRIPTION - PAIN TYPE
TYPE: SURGICAL PAIN

## 2020-10-13 ASSESSMENT — PAIN SCALES - GENERAL: PAIN_LEVEL: 5

## 2020-10-13 ASSESSMENT — PAIN DESCRIPTION - DESCRIPTORS: DESCRIPTORS: PRESSURE;CRAMPING

## 2020-10-13 NOTE — H&P
"Surgery History and Physical    Chief Complaint: Abdominal pain x3 days.     History of Present Illness: The patient is a 25 year-old  young woman who was in her usual state of health until about 3 days ago.  She began to have some right upper quadrant abdominal pain which has gradually worsened and this is associated with nausea distention.  She denies any vomiting or fevers.  She denies any cough or body aches.  She states that she has had this pain in her upper abdomen before but it resolved on its own after a couple of hours.  Patient denies any frequency, urgency or dysuria.  She denies any shortness of breath or productive cough.  She has no other complaints    Past Medical History:  has a past medical history of Anxiety (2013), Depression (2013), and Migraine.    Past Surgical History:  has no past surgical history on file.    Allergies: No Known Allergies    Current Medications:    Home Medications     Reviewed by Maylin Fontanez (Pharmacy Tech) on 10/13/20 at 0925  Med List Status: Complete   Medication Last Dose Status   acetaminophen (TYLENOL) 500 MG Tab > 2 weeks Active                Family History: family history is not on file.    Social History:  reports that she quit smoking about 7 years ago. She has never used smokeless tobacco. She reports that she does not drink alcohol or use drugs.    Review of Systems: Review of systems is remarkable for the following Abdominal pain, bloating and nausea. The remainder of the comprehensive ROS is negative with the exception of the aforementioned HPI, PMH, and PSH bullets in accordance with CMS guideline.    Physical Examination:     Constitutional:     Vital Signs: BP (!) 91/57   Pulse 74   Temp 37.2 °C (99 °F) (Temporal)   Resp 18   Ht 1.6 m (5' 2.99\")   Wt 81.2 kg (179 lb 0.2 oz)   SpO2 95%    General Appearance: The patient is a no jaundice or icterus.  No acute distress..  HEENT:    Normocephalic and atraumatic, mucous membranes moist "   NEck:    Supple  Respiratory:   Inspection: Unlabored respirations, no intercostal retractions, paradoxical motion, or accessory muscle use.   Clear to auscultation bilaterally  Abdomen:   Inspection: Abdominal inspection reveals no scars or hernias.   Palpation: Palpation is remarkable for epigastric and right upper quadrant tenderness with mild rebound  Skin:    Examination of the skin reveals no lesions.  Neurologic:   Neurologic examination reveals no focal deficits noted.    Laboratory Values:   Recent Labs     10/13/20  0620   WBC 8.4   RBC 4.35   HEMOGLOBIN 12.3   HEMATOCRIT 38.0   MCV 87.4   MCH 28.3   MCHC 32.4*   RDW 43.0   PLATELETCT 243   MPV 10.2     Recent Labs     10/13/20  0620   SODIUM 137   POTASSIUM 3.9   CHLORIDE 105   CO2 22   GLUCOSE 91   BUN 13   CREATININE 0.49*   CALCIUM 8.8     Recent Labs     10/13/20  0620   ASTSGOT 18   ALTSGPT 23   TBILIRUBIN 0.3   ALKPHOSPHAT 65   GLOBULIN 2.9            Imaging:   US-RUQ   Final Result      Gallstone in the gallbladder neck, with borderline thickened gallbladder wall. Early acute cholecystitis can be considered in the appropriate clinical settings. HIDA scan can be done if clinically indicated.          Assessment and Plan:   Is a 25-year-old female who presented for evaluation of abdominal pain and is found to have a impacted gallstone at the neck of the gallbladder without overt acute cholecystitis.  Considering this morphology and patient symptoms, likelihood is that it will not resolve with conservative measures and I recommended laparoscopic cholecystectomy.  Patient has normal white count and normal LFTs, which favors potential discharge from postanesthesia care unit as long as she is able to meet discharge criteria.  I described the procedure of laparoscopic cholecystectomy with attendant risks which do include bleeding, biloma, bile leak, common bile duct injury, wound infection and conversion open procedure.  She understands these risks and  wishes to proceed.      Disposition: Surgery.         ____________________________________     Spencer Nur D.O.    DD: 10/13/2020  9:31 AM

## 2020-10-13 NOTE — ANESTHESIA TIME REPORT
Anesthesia Start and Stop Event Times     Date Time Event    10/13/2020 0944 Ready for Procedure     1018 Anesthesia Start     1111 Anesthesia Stop        Responsible Staff  10/13/20    Name Role Begin End    Kalpesh Daniels M.D. Anesth 1018 1111        Preop Diagnosis (Free Text):  Pre-op Diagnosis     acute cholecystitis        Preop Diagnosis (Codes):    Post op Diagnosis  Cholecystitis      Premium Reason  Non-Premium    Comments:

## 2020-10-13 NOTE — PROGRESS NOTES
Pt to go to surgery. Updated her concerning plan of care. Report given to pre - op RN. Leaving via gurney w/ transport to go to surgery @ this time.

## 2020-10-13 NOTE — ANESTHESIA PREPROCEDURE EVALUATION
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      Induction: intravenous    Postoperative Plan: Postoperative administration of opioids is intended.    Pertinent diagnostic labs and testing reviewed    Informed Consent:    Anesthetic plan and risks discussed with patient.    Use of blood products discussed with: patient whom consented to blood products.

## 2020-10-13 NOTE — DISCHARGE INSTRUCTIONS
ACTIVITY: Rest and take it easy for the first 24 hours.  A responsible adult is recommended to remain with you during that time.  It is normal to feel sleepy.  We encourage you to not do anything that requires balance, judgment or coordination.    MILD FLU-LIKE SYMPTOMS ARE NORMAL. YOU MAY EXPERIENCE GENERALIZED MUSCLE ACHES, THROAT IRRITATION, HEADACHE AND/OR SOME NAUSEA.    FOR 24 HOURS DO NOT:  Drive, operate machinery or run household appliances.  Drink beer or alcoholic beverages.   Make important decisions or sign legal documents.    SPECIAL INSTRUCTIONS:   Laparoscopic Cholecystectomy, Care After  This sheet gives you information about how to care for yourself after your procedure. Your doctor may also give you more specific instructions. If you have problems or questions, contact your doctor.  Follow these instructions at home:  Care for cuts from surgery (incisions)    · Follow instructions from your doctor about how to take care of your cuts from surgery. Make sure you:  ? Wash your hands with soap and water before you change your bandage (dressing). If you cannot use soap and water, use hand .  ? Change your bandage as told by your doctor.  ? Leave stitches (sutures), skin glue, or skin tape (adhesive) strips in place. They may need to stay in place for 2 weeks or longer. If tape strips get loose and curl up, you may trim the loose edges. Do not remove tape strips completely unless your doctor says it is okay.  · Do not take baths, swim, or use a hot tub until your doctor says it is okay. Ask your doctor if you can take showers. You may only be allowed to take sponge baths for bathing.  · Check your surgical cut area every day for signs of infection. Check for:  ? More redness, swelling, or pain.  ? More fluid or blood.  ? Warmth.  ? Pus or a bad smell.  Activity  · Do not drive or use heavy machinery while taking prescription pain medicine.  · Do not lift anything that is heavier than 10 lb  (4.5 kg) until your doctor says it is okay.  · Do not play contact sports until your doctor says it is okay.  · Do not drive for 24 hours if you were given a medicine to help you relax (sedative).  · Rest as needed. Do not return to work or school until your doctor says it is okay.  General instructions  · Take over-the-counter and prescription medicines only as told by your doctor.  · To prevent or treat constipation while you are taking prescription pain medicine, your doctor may recommend that you:  ? Drink enough fluid to keep your pee (urine) clear or pale yellow.  ? Take over-the-counter or prescription medicines.  ? Eat foods that are high in fiber, such as fresh fruits and vegetables, whole grains, and beans.  ? Limit foods that are high in fat and processed sugars, such as fried and sweet foods.  Contact a doctor if:  · You develop a rash.  · You have more redness, swelling, or pain around your surgical cuts.  · You have more fluid or blood coming from your surgical cuts.  · Your surgical cuts feel warm to the touch.  · You have pus or a bad smell coming from your surgical cuts.  · You have a fever.  · One or more of your surgical cuts breaks open.  Get help right away if:  · You have trouble breathing.  · You have chest pain.  · You have pain that is getting worse in your shoulders.  · You faint or feel dizzy when you stand.  · You have very bad pain in your belly (abdomen).  · You are sick to your stomach (nauseous) for more than one day.  · You have throwing up (vomiting) that lasts for more than one day.  · You have leg pain.  This information is not intended to replace advice given to you by your health care provider. Make sure you discuss any questions you have with your health care provider.    DIET: To avoid nausea, slowly advance diet as tolerated, avoiding spicy or greasy foods for the first day.  Add more substantial food to your diet according to your physician's instructions.  Babies can be fed  formula or breast milk as soon as they are hungry.  INCREASE FLUIDS AND FIBER TO AVOID CONSTIPATION.    SURGICAL DRESSING/BATHING: May shower. The glue on your incisions will come off on it's own. Do not pick or peel it off.     FOLLOW-UP APPOINTMENT:  A follow-up appointment should be arranged with your doctor in 1-2 Weeks; call to schedule.    You should CALL YOUR PHYSICIAN if you develop:  Fever greater than 101 degrees F.  Pain not relieved by medication, or persistent nausea or vomiting.  Excessive bleeding (blood soaking through dressing) or unexpected drainage from the wound.  Extreme redness or swelling around the incision site, drainage of pus or foul smelling drainage.  Inability to urinate or empty your bladder within 8 hours.  Problems with breathing or chest pain.    You should call 911 if you develop problems with breathing or chest pain.  If you are unable to contact your doctor or surgical center, you should go to the nearest emergency room or urgent care center.      Physician's telephone #: [phone] Dr. Nur    If any questions arise, call your doctor.  If your doctor is not available, please feel free to call the Surgical Center at [phone]. The Contact Center is open Monday through Friday 7AM to 5PM and may speak to a nurse at [phone], or toll free at [phone].     A registered nurse may call you a few days after your surgery to see how you are doing after your procedure.    MEDICATIONS: Resume taking daily medication.  Take prescribed pain medication with food.  If no medication is prescribed, you may take non-aspirin pain medication if needed.  PAIN MEDICATION CAN BE VERY CONSTIPATING.  Take a stool softener or laxative such as senokot, pericolace, or milk of magnesia if needed.    Prescription given for Oxycodone (pain).  Last pain medication given at 11:22 AM.    If your physician has prescribed pain medication that includes Acetaminophen (Tylenol), do not take  additional Acetaminophen (Tylenol) while taking the prescribed medication.    Depression / Suicide Risk    As you are discharged from this [facility] facility, it is important to learn how to keep safe from harming yourself.    Recognize the warning signs:  · Abrupt changes in personality, positive or negative- including increase in energy   · Giving away possessions  · Change in eating patterns- significant weight changes-  positive or negative  · Change in sleeping patterns- unable to sleep or sleeping all the time   · Unwillingness or inability to communicate  · Depression  · Unusual sadness, discouragement and loneliness  · Talk of wanting to die  · Neglect of personal appearance   · Rebelliousness- reckless behavior  · Withdrawal from people/activities they love  · Confusion- inability to concentrate     If you or a loved one observes any of these behaviors or has concerns about self-harm, here's what you can do:  · Talk about it- your feelings and reasons for harming yourself  · Remove any means that you might use to hurt yourself (examples: pills, rope, extension cords, firearm)  · Get professional help from the community (Mental Health, Substance Abuse, psychological counseling)  · Do not be alone:Call your Safe Contact- someone whom you trust who will be there for you.  · Call your local CRISIS HOTLINE 358-9154 or 069-018-0300  · Call your local Children's Mobile Crisis Response Team [place] (144) 695-0100 or www.FindMySong  · Call the toll free National Suicide Prevention Hotlines   · National Suicide Prevention Lifeline 757-194-AUII (2049)  · National Hope Line Network 800-SUICIDE (923-6581)

## 2020-10-13 NOTE — OP REPORT
Operative Report    PreOp Diagnosis: Acute Chlecystitis    PostOp Diagnosis: Acute and Chronic Cholecystitis    Procedure(s):  CHOLECYSTECTOMY, LAPAROSCOPIC - Wound Class: Clean Contaminated    Surgeon(s):  BA Collazo M.D.    Anesthesiologist/Type of Anesthesia:  Anesthesiologist: Kalpesh Daniels M.D./General    Surgical Staff:  Circulator: Tamela Sanders R.N.  Scrub Person: Celestine Kemp    Specimens removed if any:  ID Type Source Tests Collected by Time Destination   A : Gallbladder Tissue Gallbladder PATHOLOGY SPECIMEN Spencer Nur D.O. 10/13/2020  9:48 AM        Estimated Blood Loss: Minimal    Findings: Enlarged galbladder with hydrops and pericholycystic edmanone    Complications: Indications: 25 year female with abdominal pain and nausea with abnormal gallbladder on ultrasound    OPERATIVE REPORT: The patient was brought to the operating room and placed in  supine position on the operating table. After adequate general anesthesia,   the abdomen was prepped and draped in standard fashion. An area inferior to the umbilucus was   infiltrated with 0.25% bupivacaine. An incision was made through the skin and  subcutaneous tissues. We then bluntly dissected down to the anterior fascia,  which was elevated into the wound using a Kocher clamp. A stay suture of 0   Vicryl was then placed. The fascia was then incised and we dissected through   the abdominal wall in layers until the peritoneum was entered. We then placed  the Fernando port and insufflated the abdomen. The area in the epigastrium was  chosen for a 10 mm port. The skin and subcutaneous tissue were infiltrated   with 0.25% bupivacaine. A small incision was made and a 5 mm port was   inserted under direct camera observation. Two additional 5 mm ports were   placed in the right lateral abdominal wall using identical technique. I then   grasped the fundus of the gallbladder and retracted it anteriorly and   superiorly. The  infundibulum was retracted anteriorly and laterally. We then  skeletonized the cystic duct, doubly clip distally and singly clipped   proximally and then divided with the endoscopic shear. The cystic artery was   then skeletonized and doubly clipped proximally and singly clipped distally   prior to dividing with the endoscopic shear. The gallbladder was then   dissected free from its fossa. When this was completed, we placed in an EndoCatch bag and   retrieved it from the umbilical port site. We then irrigated the gallbladder   fossa in the right upper quadrant until the effluent was clear. There was no   sign of bleeding or bile leakage. The ports were then removed. The fascia at  the umbilical port site was closed using the stay suture placed at the   beginning of the case. The wounds were then irrigated and the skin was closed  using a 4-0 Monocryl stitch in a subcuticular fashion. The area was then   cleaned and dried and dermabond was applied. The patient was then awakened from anesthesia   and taken to post-anesthesia care unit in stable condition. The sponge, needle, and   instrument count was correct at the end of the case and I was present for the   entirety of the case.          10/13/2020 11:05 AM Spencer Nur D.O.

## 2020-10-13 NOTE — ED NOTES
Pt ambulated to [facility] from the lobby with reports of mid-epigastric pain that radiates to her back. No alleviating or aggravating symptoms pt report. A&Ox4, VSS. Pt updated on POC and verbalized understanding. Chart up for ERP

## 2020-10-13 NOTE — ANESTHESIA PROCEDURE NOTES
Airway    Date/Time: 10/13/2020 10:26 AM  Performed by: Kalpesh Daniels M.D.  Authorized by: Kalpesh Daniels M.D.     Location:  OR  Urgency:  Elective  Indications for Airway Management:  Anesthesia      Spontaneous Ventilation: absent    Sedation Level:  Deep  Preoxygenated: Yes    Patient Position:  Sniffing  Mask Difficulty Assessment:  1 - vent by mask  Final Airway Type:  Endotracheal airway  Final Endotracheal Airway:  ETT  Cuffed: Yes    Technique Used for Successful ETT Placement:  Direct laryngoscopy    Insertion Site:  Oral  Blade Type:  Sher  Laryngoscope Blade/Videolaryngoscope Blade Size:  3  ETT Size (mm):  7.0  Measured from:  Teeth  ETT to Teeth (cm):  21  Placement Verified by: auscultation and capnometry    Cormack-Lehane Classification:  Grade I - full view of glottis  Number of Attempts at Approach:  1

## 2020-10-13 NOTE — ED TRIAGE NOTES
"Chief Complaint   Patient presents with   • Abdominal Pain     PT reports upper ABD pain x 3 days and not improving.  PT reports nausea no vomiting.     Blood Pressure 118/77   Pulse 73   Temperature 36.3 °C (97.3 °F) (Temporal)   Respiration 14   Height 1.6 m (5' 2.99\")   Weight 81.2 kg (179 lb 0.2 oz)   Oxygen Saturation 98%   Body Mass Index 31.72 kg/m²     "

## 2020-10-13 NOTE — ANESTHESIA POSTPROCEDURE EVALUATION
Patient: Juana Infante    Procedure Summary     Date: 10/13/20 Room / Location: [place] [address] / SURGERY [facility]    Anesthesia Start: 1018 Anesthesia Stop: 1111    Procedure: CHOLECYSTECTOMY, LAPAROSCOPIC (N/A Abdomen) Diagnosis: (acute cholecystitis)    Surgeon: Spencer Nur D.O. Responsible Provider: Kalpesh Daniels M.D.    Anesthesia Type: general ASA Status: 2          Final Anesthesia Type: general  Last vitals  BP   Blood Pressure: 111/77    Temp   36.3 °C (97.4 °F)    Pulse   Pulse: 93   Resp   16    SpO2   93 %      Anesthesia Post Evaluation    Patient location during evaluation: PACU  Patient participation: complete - patient participated  Level of consciousness: awake and alert  Pain score: 5    Airway patency: patent  Anesthetic complications: no  Cardiovascular status: hemodynamically stable  Respiratory status: acceptable  Hydration status: euvolemic    PONV: none           Nurse Pain Score: 5 (NPRS)

## 2020-10-13 NOTE — ED PROVIDER NOTES
ED Provider Note    Scribed for Evan Braun M.D. by Rosanna Leigh. 10/13/2020  6:00 AM    Primary care provider: Humberto Herrera M.D.  Means of arrival: Walk-in  History obtained from: Patient  History limited by: None    CHIEF COMPLAINT  Chief Complaint   Patient presents with   • Abdominal Pain     PT reports upper ABD pain x 3 days and not improving.  PT reports nausea no vomiting.       HPI  Juana Infante is a 25 y.o. female who presents to the Emergency Department with mild constant worsening upper abdominal pain onset 3 days ago, but worsening this morning. She endorses associated abdominal distension and  nausea, but denies any vomiting or diarrhea. Patient reports a history of similar symptoms. Patient denies any history of abdominal surgeries. No alleviating factors attempted.     REVIEW OF SYSTEMS  Pertinent positives include abdominal pain, abdominal distension, and nausea.   Pertinent negatives include no vomiting or diarrhea.    All other systems reviewed and negative. See HPI for further details.     PAST MEDICAL HISTORY   has a past medical history of Anxiety (), Depression (), and Migraine.    SURGICAL HISTORY  patient denies any surgical history    SOCIAL HISTORY  Social History     Tobacco Use   • Smoking status: Former Smoker     Quit date: 2013     Years since quittin.3   • Smokeless tobacco: Never Used   Substance Use Topics   • Alcohol use: No     Comment: socially   • Drug use: No     Types: Methamphetamines     Comment: Last used 2013      Social History     Substance and Sexual Activity   Drug Use No   • Types: Methamphetamines    Comment: Last used 2013       FAMILY HISTORY  None noted    CURRENT MEDICATIONS  Home Medications     Reviewed by Maylin Fontanez (Pharmacy Tech) on 10/13/20 at 0925  Med List Status: Complete   Medication Last Dose Status   acetaminophen (TYLENOL) 500 MG Tab > 2 weeks Active                ALLERGIES  No Known Allergies    PHYSICAL  "EXAM  VITAL SIGNS: /77   Pulse 73   Temp 36.3 °C (97.3 °F) (Temporal)   Resp 14   Ht 1.6 m (5' 2.99\")   Wt 81.2 kg (179 lb 0.2 oz)   SpO2 98%   BMI 31.72 kg/m²     Nursing note and vitals reviewed.  Constitutional: Well-developed and well-nourished. No distress.   HENT: Head is normocephalic and atraumatic. Oropharynx is clear and moist without exudate or erythema.   Eyes: Pupils are equal, round, and reactive to light. Conjunctiva are normal.   Cardiovascular: Normal rate and regular rhythm. No murmur heard. Normal radial pulses.  Pulmonary/Chest: Breath sounds normal. No wheezes or rales.   Abdominal: Soft. No distention. Tenderness in her epigastrium, an equivocal galdamez's sign, otherswise soft, no perinetal signs.  Musculoskeletal: Extremities exhibit normal range of motion without edema or tenderness.   Neurological: Awake, alert and oriented to person, place, and time. No focal deficits noted.  Skin: Skin is warm and dry. No rash.   Psychiatric: Normal mood and affect. Appropriate for clinical situation.    DIAGNOSTIC STUDIES / PROCEDURES    LABS  Results for orders placed or performed during the hospital encounter of 10/13/20   CBC WITH DIFFERENTIAL   Result Value Ref Range    WBC 8.4 4.8 - 10.8 K/uL    RBC 4.35 4.20 - 5.40 M/uL    Hemoglobin 12.3 12.0 - 16.0 g/dL    Hematocrit 38.0 37.0 - 47.0 %    MCV 87.4 81.4 - 97.8 fL    MCH 28.3 27.0 - 33.0 pg    MCHC 32.4 (L) 33.6 - 35.0 g/dL    RDW 43.0 35.9 - 50.0 fL    Platelet Count 243 164 - 446 K/uL    MPV 10.2 9.0 - 12.9 fL    Neutrophils-Polys 53.20 44.00 - 72.00 %    Lymphocytes 37.80 22.00 - 41.00 %    Monocytes 5.60 0.00 - 13.40 %    Eosinophils 2.60 0.00 - 6.90 %    Basophils 0.40 0.00 - 1.80 %    Immature Granulocytes 0.40 0.00 - 0.90 %    Nucleated RBC 0.00 /100 WBC    Neutrophils (Absolute) 4.49 2.00 - 7.15 K/uL    Lymphs (Absolute) 3.19 1.00 - 4.80 K/uL    Monos (Absolute) 0.47 0.00 - 0.85 K/uL    Eos (Absolute) 0.22 0.00 - 0.51 K/uL    " Baso (Absolute) 0.03 0.00 - 0.12 K/uL    Immature Granulocytes (abs) 0.03 0.00 - 0.11 K/uL    NRBC (Absolute) 0.00 K/uL   COMP METABOLIC PANEL   Result Value Ref Range    Sodium 137 135 - 145 mmol/L    Potassium 3.9 3.6 - 5.5 mmol/L    Chloride 105 96 - 112 mmol/L    Co2 22 20 - 33 mmol/L    Anion Gap 10.0 7.0 - 16.0    Glucose 91 65 - 99 mg/dL    Bun 13 8 - 22 mg/dL    Creatinine 0.49 (L) 0.50 - 1.40 mg/dL    Calcium 8.8 8.5 - 10.5 mg/dL    AST(SGOT) 18 12 - 45 U/L    ALT(SGPT) 23 2 - 50 U/L    Alkaline Phosphatase 65 30 - 99 U/L    Total Bilirubin 0.3 0.1 - 1.5 mg/dL    Albumin 4.2 3.2 - 4.9 g/dL    Total Protein 7.1 6.0 - 8.2 g/dL    Globulin 2.9 1.9 - 3.5 g/dL    A-G Ratio 1.4 g/dL   LIPASE   Result Value Ref Range    Lipase 24 11 - 82 U/L   BETA-HCG QUALITATIVE SERUM   Result Value Ref Range    Beta-Hcg Qualitative Serum Negative Negative   ESTIMATED GFR   Result Value Ref Range    GFR If African American >60 >60 mL/min/1.73 m 2    GFR If Non African American >60 >60 mL/min/1.73 m 2   COVID/SARS CoV-2 PCR    Specimen: Nasopharyngeal; Respirate   Result Value Ref Range    COVID Order Status Received    SARS-CoV-2, PCR (In-House)   Result Value Ref Range    SARS-CoV-2 Source Nasal Swab      All labs reviewed by me.    RADIOLOGY  US-RUQ   Final Result      Gallstone in the gallbladder neck, with borderline thickened gallbladder wall. Early acute cholecystitis can be considered in the appropriate clinical settings. HIDA scan can be done if clinically indicated.        The radiologist's interpretation of all radiological studies have been reviewed by me.    COURSE & MEDICAL DECISION MAKING  Nursing notes, VS, PMSFHx reviewed in chart.     6:00 AM - Patient seen and examined at bedside. Patent is laying down in bed with abdominal pain. Patient will be treated with Dilaudid 0.5 mg, Zofran 4 mg, Toradol 30 mg, Rocephin 1g, Flagyl 500 mg. Ordered US-RUQ, Beta-HCG Qual Serum, CBC with differentials, CMP, and Lipase to  evaluate her symptoms. The differential diagnoses include but are not limited to: peptic ulcer disease, pancreatitis, or cholelithiasis.     Ultrasound is consistent with cholecystitis.  Stone in the gallbladder neck.  Wall thickening.  Metabolic panel is remarkable for normal total bilirubin.  Treated with antibiotics in the emergency department.    8:02 AM - Paged surgery.     9:30 AM I discussed the patient's case and the above findings with Dr. Nur (surgery) who will see the patient for hospitalization.    DISPOSITION:  Patient will be hospitalized by Dr. Nur in gaurded condition.    FINAL IMPRESSION  1. Calculus of gallbladder with acute cholecystitis and obstruction          Rosanna ANDERSON (Scribe), am scribing for, and in the presence of, Evan Braun M.D..    Electronically signed by: Rosanna Leigh (Scribe), 10/13/2020    Evan ANDERSON M.D. personally performed the services described in this documentation, as scribed by Rosanna Leigh in my presence, and it is both accurate and complete. C.    The note accurately reflects work and decisions made by me.  Evan Braun M.D.  10/13/2020  11:28 AM

## 2020-12-23 ENCOUNTER — TELEPHONE (OUTPATIENT)
Dept: SCHEDULING | Facility: IMAGING CENTER | Age: 25
End: 2020-12-23

## 2021-01-24 ENCOUNTER — APPOINTMENT (OUTPATIENT)
Dept: RADIOLOGY | Facility: MEDICAL CENTER | Age: 26
End: 2021-01-24
Attending: EMERGENCY MEDICINE
Payer: COMMERCIAL

## 2021-01-24 ENCOUNTER — HOSPITAL ENCOUNTER (EMERGENCY)
Facility: MEDICAL CENTER | Age: 26
End: 2021-01-24
Attending: EMERGENCY MEDICINE
Payer: COMMERCIAL

## 2021-01-24 VITALS
HEART RATE: 82 BPM | TEMPERATURE: 98.2 F | DIASTOLIC BLOOD PRESSURE: 72 MMHG | WEIGHT: 194 LBS | SYSTOLIC BLOOD PRESSURE: 110 MMHG | OXYGEN SATURATION: 93 % | HEIGHT: 63 IN | BODY MASS INDEX: 34.38 KG/M2 | RESPIRATION RATE: 17 BRPM

## 2021-01-24 DIAGNOSIS — R11.0 NAUSEA: ICD-10-CM

## 2021-01-24 DIAGNOSIS — R10.10 UPPER ABDOMINAL PAIN: ICD-10-CM

## 2021-01-24 DIAGNOSIS — K29.00 ACUTE GASTRITIS WITHOUT HEMORRHAGE, UNSPECIFIED GASTRITIS TYPE: ICD-10-CM

## 2021-01-24 LAB
ALBUMIN SERPL BCP-MCNC: 4.4 G/DL (ref 3.2–4.9)
ALBUMIN/GLOB SERPL: 1.4 G/DL
ALP SERPL-CCNC: 70 U/L (ref 30–99)
ALT SERPL-CCNC: 24 U/L (ref 2–50)
ANION GAP SERPL CALC-SCNC: 12 MMOL/L (ref 7–16)
APPEARANCE UR: CLEAR
AST SERPL-CCNC: 22 U/L (ref 12–45)
BASOPHILS # BLD AUTO: 0.5 % (ref 0–1.8)
BASOPHILS # BLD: 0.03 K/UL (ref 0–0.12)
BILIRUB SERPL-MCNC: 0.3 MG/DL (ref 0.1–1.5)
BILIRUB UR QL STRIP.AUTO: NEGATIVE
BLOOD CULTURE HOLD CXBCH: NORMAL
BUN SERPL-MCNC: 6 MG/DL (ref 8–22)
CALCIUM SERPL-MCNC: 9.6 MG/DL (ref 8.5–10.5)
CHLORIDE SERPL-SCNC: 105 MMOL/L (ref 96–112)
CO2 SERPL-SCNC: 23 MMOL/L (ref 20–33)
COLOR UR: YELLOW
CREAT SERPL-MCNC: 0.46 MG/DL (ref 0.5–1.4)
EOSINOPHIL # BLD AUTO: 0.08 K/UL (ref 0–0.51)
EOSINOPHIL NFR BLD: 1.4 % (ref 0–6.9)
ERYTHROCYTE [DISTWIDTH] IN BLOOD BY AUTOMATED COUNT: 38.8 FL (ref 35.9–50)
GLOBULIN SER CALC-MCNC: 3.2 G/DL (ref 1.9–3.5)
GLUCOSE SERPL-MCNC: 128 MG/DL (ref 65–99)
GLUCOSE UR STRIP.AUTO-MCNC: NEGATIVE MG/DL
HCG SERPL QL: NEGATIVE
HCT VFR BLD AUTO: 41 % (ref 37–47)
HGB BLD-MCNC: 13.7 G/DL (ref 12–16)
IMM GRANULOCYTES # BLD AUTO: 0.02 K/UL (ref 0–0.11)
IMM GRANULOCYTES NFR BLD AUTO: 0.3 % (ref 0–0.9)
KETONES UR STRIP.AUTO-MCNC: NEGATIVE MG/DL
LACTATE BLD-SCNC: 1.5 MMOL/L (ref 0.5–2)
LEUKOCYTE ESTERASE UR QL STRIP.AUTO: NEGATIVE
LIPASE SERPL-CCNC: 24 U/L (ref 11–82)
LYMPHOCYTES # BLD AUTO: 2.35 K/UL (ref 1–4.8)
LYMPHOCYTES NFR BLD: 40.6 % (ref 22–41)
MCH RBC QN AUTO: 28.5 PG (ref 27–33)
MCHC RBC AUTO-ENTMCNC: 33.4 G/DL (ref 33.6–35)
MCV RBC AUTO: 85.4 FL (ref 81.4–97.8)
MICRO URNS: NORMAL
MONOCYTES # BLD AUTO: 0.3 K/UL (ref 0–0.85)
MONOCYTES NFR BLD AUTO: 5.2 % (ref 0–13.4)
NEUTROPHILS # BLD AUTO: 3.01 K/UL (ref 2–7.15)
NEUTROPHILS NFR BLD: 52 % (ref 44–72)
NITRITE UR QL STRIP.AUTO: NEGATIVE
NRBC # BLD AUTO: 0 K/UL
NRBC BLD-RTO: 0 /100 WBC
PH UR STRIP.AUTO: 5.5 [PH] (ref 5–8)
PLATELET # BLD AUTO: 256 K/UL (ref 164–446)
PMV BLD AUTO: 9.8 FL (ref 9–12.9)
POTASSIUM SERPL-SCNC: 3.8 MMOL/L (ref 3.6–5.5)
PROT SERPL-MCNC: 7.6 G/DL (ref 6–8.2)
PROT UR QL STRIP: NEGATIVE MG/DL
RBC # BLD AUTO: 4.8 M/UL (ref 4.2–5.4)
RBC UR QL AUTO: NEGATIVE
SODIUM SERPL-SCNC: 140 MMOL/L (ref 135–145)
SP GR UR STRIP.AUTO: 1.01
UROBILINOGEN UR STRIP.AUTO-MCNC: 0.2 MG/DL
WBC # BLD AUTO: 5.8 K/UL (ref 4.8–10.8)

## 2021-01-24 PROCEDURE — 96376 TX/PRO/DX INJ SAME DRUG ADON: CPT

## 2021-01-24 PROCEDURE — 83605 ASSAY OF LACTIC ACID: CPT

## 2021-01-24 PROCEDURE — 83690 ASSAY OF LIPASE: CPT

## 2021-01-24 PROCEDURE — 80053 COMPREHEN METABOLIC PANEL: CPT

## 2021-01-24 PROCEDURE — 99285 EMERGENCY DEPT VISIT HI MDM: CPT

## 2021-01-24 PROCEDURE — 700117 HCHG RX CONTRAST REV CODE 255: Performed by: EMERGENCY MEDICINE

## 2021-01-24 PROCEDURE — A9270 NON-COVERED ITEM OR SERVICE: HCPCS | Performed by: EMERGENCY MEDICINE

## 2021-01-24 PROCEDURE — 96375 TX/PRO/DX INJ NEW DRUG ADDON: CPT

## 2021-01-24 PROCEDURE — 84703 CHORIONIC GONADOTROPIN ASSAY: CPT

## 2021-01-24 PROCEDURE — 74177 CT ABD & PELVIS W/CONTRAST: CPT

## 2021-01-24 PROCEDURE — 700102 HCHG RX REV CODE 250 W/ 637 OVERRIDE(OP): Performed by: EMERGENCY MEDICINE

## 2021-01-24 PROCEDURE — 700111 HCHG RX REV CODE 636 W/ 250 OVERRIDE (IP): Performed by: EMERGENCY MEDICINE

## 2021-01-24 PROCEDURE — 96374 THER/PROPH/DIAG INJ IV PUSH: CPT | Mod: XU

## 2021-01-24 PROCEDURE — 81003 URINALYSIS AUTO W/O SCOPE: CPT

## 2021-01-24 PROCEDURE — 85025 COMPLETE CBC W/AUTO DIFF WBC: CPT

## 2021-01-24 RX ORDER — MORPHINE SULFATE 4 MG/ML
4 INJECTION, SOLUTION INTRAMUSCULAR; INTRAVENOUS ONCE
Status: COMPLETED | OUTPATIENT
Start: 2021-01-24 | End: 2021-01-24

## 2021-01-24 RX ORDER — OMEPRAZOLE 20 MG/1
20 CAPSULE, DELAYED RELEASE ORAL DAILY
Qty: 14 CAP | Refills: 0 | Status: SHIPPED | OUTPATIENT
Start: 2021-01-24 | End: 2021-02-07

## 2021-01-24 RX ORDER — ONDANSETRON 4 MG/1
4 TABLET, ORALLY DISINTEGRATING ORAL EVERY 8 HOURS PRN
Qty: 6 TAB | Refills: 0 | Status: SHIPPED | OUTPATIENT
Start: 2021-01-24 | End: 2021-01-26

## 2021-01-24 RX ORDER — SUCRALFATE 1 G/1
1 TABLET ORAL
Qty: 28 TAB | Refills: 0 | Status: SHIPPED | OUTPATIENT
Start: 2021-01-24 | End: 2021-01-31

## 2021-01-24 RX ORDER — ONDANSETRON 2 MG/ML
4 INJECTION INTRAMUSCULAR; INTRAVENOUS ONCE
Status: COMPLETED | OUTPATIENT
Start: 2021-01-24 | End: 2021-01-24

## 2021-01-24 RX ORDER — PROMETHAZINE HYDROCHLORIDE 25 MG/1
25 TABLET ORAL ONCE
Status: COMPLETED | OUTPATIENT
Start: 2021-01-24 | End: 2021-01-24

## 2021-01-24 RX ORDER — KETOROLAC TROMETHAMINE 30 MG/ML
15 INJECTION, SOLUTION INTRAMUSCULAR; INTRAVENOUS ONCE
Status: COMPLETED | OUTPATIENT
Start: 2021-01-24 | End: 2021-01-24

## 2021-01-24 RX ORDER — SUCRALFATE 1 G/1
1 TABLET ORAL ONCE
Status: COMPLETED | OUTPATIENT
Start: 2021-01-24 | End: 2021-01-24

## 2021-01-24 RX ADMIN — IOHEXOL 100 ML: 350 INJECTION, SOLUTION INTRAVENOUS at 15:29

## 2021-01-24 RX ADMIN — ONDANSETRON 4 MG: 2 INJECTION INTRAMUSCULAR; INTRAVENOUS at 18:37

## 2021-01-24 RX ADMIN — KETOROLAC TROMETHAMINE 15 MG: 30 INJECTION, SOLUTION INTRAMUSCULAR at 19:40

## 2021-01-24 RX ADMIN — MORPHINE SULFATE 4 MG: 4 INJECTION INTRAVENOUS at 13:43

## 2021-01-24 RX ADMIN — ONDANSETRON 4 MG: 2 INJECTION INTRAMUSCULAR; INTRAVENOUS at 13:43

## 2021-01-24 RX ADMIN — LIDOCAINE HYDROCHLORIDE 15 ML: 20 SOLUTION OROPHARYNGEAL at 14:28

## 2021-01-24 RX ADMIN — SUCRALFATE 1 G: 1 TABLET ORAL at 18:37

## 2021-01-24 RX ADMIN — FAMOTIDINE 20 MG: 10 INJECTION INTRAVENOUS at 13:43

## 2021-01-24 RX ADMIN — PROMETHAZINE HYDROCHLORIDE 25 MG: 25 TABLET ORAL at 20:18

## 2021-01-24 ASSESSMENT — FIBROSIS 4 INDEX: FIB4 SCORE: 0.42

## 2021-01-24 NOTE — ED PROVIDER NOTES
ED Provider Note    Scribed for Zenaida Hancock M.D. by Cat Rosenbaum. 1/24/2021  1:03 PM    Primary care provider: None noted  Means of arrival: Walk-in  History obtained from: Patient  History limited by: None  CHIEF COMPLAINT  Chief Complaint   Patient presents with   • Abdominal Pain     elkin in Oct, has had off and on pain since then   • Nausea       HPI  Juana Infante is a 25 y.o. female who presents complaining of waxing-and-waning moderate mid-epigastric abdominal pain that began about two hours prior to arrival and has not resolved since onset. The patient reports that she had a gallstones about three months ago and had a cholecystectomy performed by Dr. Nur (General Surgery). Since having the surgical procedure performed, the patient has been complaining of daily upper abdominal pain which gets worse 1-2 times a week.  She also complains of fatty loose stool and diarrhea.  Patient reports that the upper abdominal pain will get worse at times.  It tends to get worse after drinking caffeine or eating fatty foods.  It might last several hours and then go away.  However, she always has a baseline upper abdominal pain and has since her surgery.  She notes that her current episode of abdominal pain today is similar to the pain she experience when she was diagnosed with gallstones. During her exacerbations of epigastric pain, the patient also experiences nausea without any episodes of emesis.  She says she has had nausea almost every day since her surgery.  Between severe pain episodes, the patient is able to eat and drink without difficulty or discomfort. After eating shrimp soup this morning, the patient developed recurrent mid-epigastric abdominal pain, however she notes that her current episode of abdominal pain is more severe but is of the same character, quality and location.The patient rates her pain as a 10/10 in severity. She notes that her current abdominal pain radiates to her back and is sharp in  nature. Exacerbating factors include ingestion of greasy or spicy foods in addition to caffeine. Alleviating factors include taking Advil or Pepto Bismol. She denies symptoms of bloody stools, fevers, chills, painful urination, cough, runny nose or sore throat. The patient reports that she has not yet followed up with Dr. Nur since her cholecystectomy and does not regularly follow up with her primary care physician. The patient denies any recent known contact with COVID-19 positive individuals.    PPE Note: I personally donned full PPE for all patient encounters during this visit, including being  with an N95 respirator mask, gloves, gown, and goggles.     Scribe remained outside the patient's room and did not have any contact with the patient for the duration of patient encounter.     REVIEW OF SYSTEMS  Pertinent positives include intermittent mid-epigastric pain, intermittent diarrhea, intermittent nausea. Pertinent negatives include no bloody stools, fevers, chills, painful urination, cough, runny nose or sore throat.  All other systems reviewed and negative.     PAST MEDICAL HISTORY  Past Medical History:   Diagnosis Date   • Anxiety    • Depression    • Migraine      SOCIAL HISTORY  Social History     Socioeconomic History   • Marital status: Single   Tobacco Use   • Smoking status: Former Smoker     Quit date: 2013     Years since quittin.6   • Smokeless tobacco: Never Used   Substance and Sexual Activity   • Alcohol use: No     Comment: socially   • Drug use: No     Types: Methamphetamines     Comment: Last used 2013   • Sexual activity: Yes     Partners: Male     Comment: None       SURGICAL HISTORY  Past Surgical History:   Procedure Laterality Date   • GIANFRANCO BY LAPAROSCOPY N/A 10/13/2020    Procedure: CHOLECYSTECTOMY, LAPAROSCOPIC;  Surgeon: Spencer Nur D.O.;  Location: SURGERY [facility];  Service: General       CURRENT MEDICATIONS  The patient does not take any daily  "medications    ALLERGIES  No Known Allergies    PHYSICAL EXAM  VITAL SIGNS: /97   Pulse (!) 101   Temp 36.4 °C (97.6 °F) (Temporal)   Resp 18   Ht 1.6 m (5' 3\")   Wt 88 kg (194 lb 0.1 oz)   SpO2 97%   BMI 34.37 kg/m²      Constitutional: Well developed, well nourished; Mild acute distress; Non-toxic appearance.   HENT: Normocephalic, atraumatic; Bilateral external ears normal;  No erythema or exudates in the posterior oropharynx.   Eyes: PERRL, EOMI, Conjunctiva normal. No discharge.   Neck:  Supple, nontender midline; No stridor; No nuchal rigidity.   Cardiovascular: Regular rate and rhythm without murmurs, rubs, or gallop.   Thorax & Lungs: No respiratory distress, breath sounds clear to auscultation bilaterally without wheezing, rales or rhonchi. Nontender chest wall.   Abdomen: Soft. Mildly tender in the mid epigastrium with palpation. Tender in the upper abdomen with percussion. Bowel sounds normal. No obvious masses; No pulsatile masses; no rebound, guarding, or peritoneal signs.   Skin: Good color; warm and dry without rash or petechia.  Back: Nontender, No CVA tenderness.   Extremities: Distal radial, dorsalis pedis, posterior tibial pulses are equal bilaterally; No edema; Nontender calves or saphenous, No cyanosis, No clubbing.   Musculoskeletal: Good range of motion in all major joints. No tenderness to palpation or major deformities noted.   Neurologic: Alert & oriented x 4, clear speech    LABS  Results for orders placed or performed during the hospital encounter of 01/24/21   CBC WITH DIFFERENTIAL   Result Value Ref Range    WBC 5.8 4.8 - 10.8 K/uL    RBC 4.80 4.20 - 5.40 M/uL    Hemoglobin 13.7 12.0 - 16.0 g/dL    Hematocrit 41.0 37.0 - 47.0 %    MCV 85.4 81.4 - 97.8 fL    MCH 28.5 27.0 - 33.0 pg    MCHC 33.4 (L) 33.6 - 35.0 g/dL    RDW 38.8 35.9 - 50.0 fL    Platelet Count 256 164 - 446 K/uL    MPV 9.8 9.0 - 12.9 fL    Neutrophils-Polys 52.00 44.00 - 72.00 %    Lymphocytes 40.60 22.00 - " 41.00 %    Monocytes 5.20 0.00 - 13.40 %    Eosinophils 1.40 0.00 - 6.90 %    Basophils 0.50 0.00 - 1.80 %    Immature Granulocytes 0.30 0.00 - 0.90 %    Nucleated RBC 0.00 /100 WBC    Neutrophils (Absolute) 3.01 2.00 - 7.15 K/uL    Lymphs (Absolute) 2.35 1.00 - 4.80 K/uL    Monos (Absolute) 0.30 0.00 - 0.85 K/uL    Eos (Absolute) 0.08 0.00 - 0.51 K/uL    Baso (Absolute) 0.03 0.00 - 0.12 K/uL    Immature Granulocytes (abs) 0.02 0.00 - 0.11 K/uL    NRBC (Absolute) 0.00 K/uL   COMP METABOLIC PANEL   Result Value Ref Range    Sodium 140 135 - 145 mmol/L    Potassium 3.8 3.6 - 5.5 mmol/L    Chloride 105 96 - 112 mmol/L    Co2 23 20 - 33 mmol/L    Anion Gap 12.0 7.0 - 16.0    Glucose 128 (H) 65 - 99 mg/dL    Bun 6 (L) 8 - 22 mg/dL    Creatinine 0.46 (L) 0.50 - 1.40 mg/dL    Calcium 9.6 8.5 - 10.5 mg/dL    AST(SGOT) 22 12 - 45 U/L    ALT(SGPT) 24 2 - 50 U/L    Alkaline Phosphatase 70 30 - 99 U/L    Total Bilirubin 0.3 0.1 - 1.5 mg/dL    Albumin 4.4 3.2 - 4.9 g/dL    Total Protein 7.6 6.0 - 8.2 g/dL    Globulin 3.2 1.9 - 3.5 g/dL    A-G Ratio 1.4 g/dL   LIPASE   Result Value Ref Range    Lipase 24 11 - 82 U/L   LACTIC ACID   Result Value Ref Range    Lactic Acid 1.5 0.5 - 2.0 mmol/L   HCG QUAL SERUM   Result Value Ref Range    Beta-Hcg Qualitative Serum Negative Negative   URINALYSIS (UA)    Specimen: Urine, Clean Catch   Result Value Ref Range    Color Yellow     Character Clear     Specific Gravity 1.006 <1.035    Ph 5.5 5.0 - 8.0    Glucose Negative Negative mg/dL    Ketones Negative Negative mg/dL    Protein Negative Negative mg/dL    Bilirubin Negative Negative    Urobilinogen, Urine 0.2 Negative    Nitrite Negative Negative    Leukocyte Esterase Negative Negative    Occult Blood Negative Negative    Micro Urine Req see below    ESTIMATED GFR   Result Value Ref Range    GFR If African American >60 >60 mL/min/1.73 m 2    GFR If Non African American >60 >60 mL/min/1.73 m 2   Blood Culture,Hold   Result Value Ref Range     Blood Culture Hold Collected          RADIOLOGY  CT-ABDOMEN-PELVIS WITH   Final Result      1.  No evidence of bowel obstruction or focal inflammatory change.      2.  Prior cholecystectomy.          COURSE & MEDICAL DECISION MAKING  Pertinent Labs & Imaging studies reviewed. (See chart for details)    1:03 PM - Patient was seen and evaluated at bedside. Patient presents to the ED for intermittent episodes of mid-epigastric abdominal pain with the most recent episode beginning two hours prior to arrival.  The patient is afebrile, well-appearing with mild tenderness int he mid epigastrium with palpation and tenderness int he upper abdomen with percussion but with an otherwise normal physical exam with stable vital signs. Discussed plan of care with patient which includes ordering lab work for further evaluation of their condition and treating the patient for her current symptoms. Patient's verbalizes their understanding and agreement to the plan of care.  Patient was medicated with Morphine 4 mg for pain control, Zofran 4 mg for nausea and Pepcid 20 mg for her abdominal pain .     2:23 PM Recheck. The patient is complaining of continued pain. Discussed plan of care with patient which includes obtaining lab work and imaging for further evaluation of her condition. She will also be medicated with a GI cocktail for her abdominal pain.    5:59 PM Recheck. The patient reports that her abdominal pain has resolved, however she notes that she feels as if her abdominal pain is returning. She rates her current abdominal pain as a 4/10 in severity. I informed the patient that her lab work and imaging are unremarkable. The patient's boyfriend states that the patient experiences waxing and waning abdominal pain that is exacerbated once a week since the patient received the cholecystectomy. The patient was informed that her symptoms may be due to some residual scar tissue. She was informed that she need to follow up with her  surgeon for her current abdominal pain. She will also need to follow up with a gastroenterologist for further evaluation of her symptoms. The patient reports that she is currently feeling nauseated. Discussed plan of care with patient which includes medicating the patient with Zofran and then discharging the patient with instructions of following up with Dr. Nur and a gastroenterologist. Patient is agreeable to the plan of care. Patient was medicated with Carafate 1 g and Zofran 4 mg.     6:49 PM Patient had one episode of emesis and reports that she remains slightly nauseated.    7:04 PM The patient's nausea has subsided however the patient is complaining of right-sided abdominal pain that she rates as a 5/10 in severity.    Nursing staff informs me patient would like to go home.  She says she believes that she vomited here in the ER today because of the morphine.  She says typically she does has nausea with this pain and no vomiting.  Patient was given several rounds of Zofran throughout her ED stay.  Prior to discharge, despite wanting to go home, she says she still nauseated.  I gave her a p.o. Phenergan prior to discharge.      Patient presents to the ER with complaint of an exacerbation of her midepigastric abdominal pain which has been constant since her cholecystectomy in October 2020.  She said that she always has a baseline midepigastric pain since her cholecystectomy.  She always has baseline nausea.  Several times a week her pain flares up and become severe for a few hours before it simmers down again and goes back to baseline mild epigastric pain.  She says the flareup today feels very similar in terms of location, character and quality to all of her other labs, although this 1 is much worse.  She has nausea with this particular episode as well.  She did not have any vomiting until I gave her some morphine.  She does not want any more morphine.  She continued to be nauseated throughout her ED stay.   Patient did not have any significant tenderness on examination.  Overall her exam was fairly benign.  Most of her tenderness was in the epigastrium with percussion.  There was only minimal inconsistent tenderness in the midepigastrium with palpation.  Her gallbladder is already gone.  CT scan was performed.  There is no obstruction, perforation, or any other dangerous intra-abdominal pathology identified.  Previous cholecystectomy was identified.  Her biliary labs look normal.  At this time I do not think she has retained gallstone.  She is not pregnant.  She may have some biliary dysfunction.  Perhaps she needs an outpatient HIDA scan to evaluate her biliary system.  She has had a lot of issues with fatty loose stools since her cholecystectomy.  Perhaps she has some issues with that finding and may benefit from follow-up with GI for cholestyramine.  She also says her midepigastric pain and seems to be exacerbated by fatty food and caffeine.  Perhaps she has gastritis, duodenitis or ulcer.  I gave her some Pepcid here in the ER.  I also gave her some Carafate but she threw it up.  I have strongly encouraged her to follow-up with Dr. Nur as she never followed up with him postoperatively.  I have asked that she call his office tomorrow to schedule her follow-up appointment.  I have also asked that she follow-up with gastroenterology.  She may need endoscopy to evaluate whether or not she has ulcer versus gastritis versus duodenitis.  I will send her home with PPI, Carafate and Zofran.  Her pain is better than it was when she came in.  She would like to go home.  Her vital signs are normal stable.  She is not septic or toxic.  She has been given strict return precautions and discharge instructions.  She understands if she gets worse in any way she must return to the ER immediately.  Patient understands treatment plan and follow-up.      FINAL IMPRESSION  1. Upper abdominal pain Acute   2. Acute gastritis without  hemorrhage, unspecified gastritis type Acute   3. Nausea Acute      This dictation has been created using voice recognition software. The accuracy of the dictation is limited by the abilities of the software. I expect there may be some errors of grammar and possibly content. I made every attempt to manually correct the errors within my dictation. However, errors related to voice recognition software may still exist and should be interpreted within the appropriate context.     Cat ANDERSON ([place]), am scribing for, and in the presence of, Zenaida Hancock M.D..    Electronically signed by: Cat Rosenbaum (Kellen), 1/24/2021    IZenaida M.D. personally performed the services described in this documentation, as scribed by Cat Rosenbaum in my presence, and it is both accurate and complete.    C    The note accurately reflects work and decisions made by me.  Zenaida Hancock M.D.  1/24/2021  10:57 PM

## 2021-01-24 NOTE — ED TRIAGE NOTES
Ambulates to triage  Chief Complaint   Patient presents with   • Abdominal Pain     elkin in Oct, has had off and on pain since then   • Nausea     Pt is crying in pain in triage, has had abd pain and diarrhea ever since her surgery, did not have a f/u with her surgeon.   Charge RN called for room.

## 2021-01-24 NOTE — ED NOTES
Patient ambulated to restroom independently in no distress and without compromise.  Will continue to monitor at this time.

## 2021-01-25 NOTE — ED NOTES
Patient medicated per MAR. Pt wanting to leave as soon as possible, discussed with patient will let ERP know. Patient tolerated a cup of water.

## 2021-01-25 NOTE — ED NOTES
"Pt discharged home with spouse. Pt is A/O x 4, ambulatory with a steady gait. IV discontinued and gauze placed, pt in possession of belongings. Pt provided discharge education and information pertaining to medications and follow up appointments. Pt provided medication scripts, discussed to pick these up at any pharmacy, patient verbalized understanding.Discussed signs and symptoms to return to the ER, patient verbalized understanding. Pt received copy of discharge instructions and verbalized understanding.       /77   Pulse 82   Temp 36.8 °C (98.2 °F) (Temporal)   Resp 18   Ht 1.6 m (5' 3\")   Wt 88 kg (194 lb 0.1 oz)   SpO2 94%   BMI 34.37 kg/m²     "

## 2021-01-25 NOTE — DISCHARGE INSTRUCTIONS
Please follow-up with Dr. Nur, your general surgeon, this week.  Please call tomorrow to schedule an appointment.    Also follow-up with [facility], gastroenterologist, this week.  Please call for appointment.    Avoid fatty food, spicy food, acidic food, caffeine, alcohol, tobacco, peppermint, anti-inflammatory such as Advil/Aleve/Naprosyn/aspirin/ibuprofen, or anything else that irritates her stomach.    Clear liquid diet for the next 12 to 24 hours and slowly advance your diet as tolerated.    Return to the ER for any worsening abdominal pain, changing abdominal pain, fevers over 100.4, shaking chills, worsening nausea, recurrent vomiting, diarrhea, black tarry stools, blood in stools, chest pain, cough, shortness of breath, or for any concerns.

## 2021-01-27 ENCOUNTER — TELEPHONE (OUTPATIENT)
Dept: SCHEDULING | Facility: IMAGING CENTER | Age: 26
End: 2021-01-27

## 2021-01-27 NOTE — LETTER
[facility]  No primary care provider on file.  No primary provider on file.  Fax: [phone]   Authorization for Release/Disclosure of   Protected Health Information   Name: JUANA SAWYER : 1995 SSN: [id number]   Address: [address] [place] [id number] Phone:    [phone] (home)    I authorize the entity listed below to release/disclose the PHI below to:   [facility]/No primary care provider on file. and Lu Lin M.D.   Provider or Entity Name:Dr. Natasha Harper/[place]     Address   City, State, Zip   Phone:      Fax: [phone]     Reason for request: continuity of care   Information to be released:    [  ] LAST COLONOSCOPY,  including any PATH REPORT and follow-up  [  ] LAST FIT/COLOGUARD RESULT [  ] LAST DEXA  [  ] LAST MAMMOGRAM  [  ] LAST PAP  [  ] LAST LABS [  ] RETINA EXAM REPORT  [  ] IMMUNIZATION RECORDS  [ XXX ] Release all info      [  ] Check here and initial the line next to each item to release ALL health information INCLUDING  _____ Care and treatment for drug and / or alcohol abuse  _____ HIV testing, infection status, or AIDS  _____ Genetic Testing    DATES OF SERVICE OR TIME PERIOD TO BE DISCLOSED: _____________  I understand and acknowledge that:  * This Authorization may be revoked at any time by you in writing, except if your health information has already been used or disclosed.  * Your health information that will be used or disclosed as a result of you signing this authorization could be re-disclosed by the recipient. If this occurs, your re-disclosed health information may no longer be protected by State or Federal laws.  * You may refuse to sign this Authorization. Your refusal will not affect your ability to obtain treatment.  * This Authorization becomes effective upon signing and will  on (date) __________.      If no date is indicated, this Authorization will  one (1) year from the signature date.    Name: Juana Sawyer    Signature:  Continuity of care   Date:     1/27/2021       PLEASE FAX REQUESTED RECORDS BACK TO: [phone]

## 2021-03-24 ENCOUNTER — OFFICE VISIT (OUTPATIENT)
Dept: MEDICAL GROUP | Facility: PHYSICIAN GROUP | Age: 26
End: 2021-03-24
Payer: COMMERCIAL

## 2021-03-24 VITALS
BODY MASS INDEX: 34.48 KG/M2 | SYSTOLIC BLOOD PRESSURE: 116 MMHG | OXYGEN SATURATION: 94 % | WEIGHT: 194.6 LBS | DIASTOLIC BLOOD PRESSURE: 62 MMHG | TEMPERATURE: 99.3 F | HEART RATE: 85 BPM | RESPIRATION RATE: 16 BRPM | HEIGHT: 63 IN

## 2021-03-24 DIAGNOSIS — R53.83 FATIGUE, UNSPECIFIED TYPE: ICD-10-CM

## 2021-03-24 DIAGNOSIS — Z00.00 WELLNESS EXAMINATION: ICD-10-CM

## 2021-03-24 DIAGNOSIS — R63.5 WEIGHT GAIN: ICD-10-CM

## 2021-03-24 DIAGNOSIS — G44.209 TENSION-TYPE HEADACHE, NOT INTRACTABLE, UNSPECIFIED CHRONICITY PATTERN: ICD-10-CM

## 2021-03-24 DIAGNOSIS — K29.50 OTHER CHRONIC GASTRITIS WITHOUT HEMORRHAGE: ICD-10-CM

## 2021-03-24 PROBLEM — K80.12 CALCULUS OF GALLBLADDER WITH ACUTE AND CHRONIC CHOLECYSTITIS WITHOUT OBSTRUCTION: Status: RESOLVED | Noted: 2020-10-13 | Resolved: 2021-03-24

## 2021-03-24 PROCEDURE — 99214 OFFICE O/P EST MOD 30 MIN: CPT | Performed by: FAMILY MEDICINE

## 2021-03-24 RX ORDER — PANTOPRAZOLE SODIUM 40 MG/1
TABLET, DELAYED RELEASE ORAL
COMMUNITY
Start: 2021-02-10 | End: 2021-03-24

## 2021-03-24 RX ORDER — MONTELUKAST SODIUM 4 MG/1
TABLET, CHEWABLE ORAL
COMMUNITY
Start: 2021-02-10 | End: 2021-03-24

## 2021-03-24 ASSESSMENT — PATIENT HEALTH QUESTIONNAIRE - PHQ9: CLINICAL INTERPRETATION OF PHQ2 SCORE: 0

## 2021-03-24 ASSESSMENT — FIBROSIS 4 INDEX: FIB4 SCORE: 0.47

## 2021-03-24 NOTE — PROGRESS NOTES
Subjective:     CC:   Chief Complaint   Patient presents with   • Establish Care       HPI:   Juana presents today to establish care.  Patient states for the last year she has gained 20 pounds.  Patient is also seen a gastroenterologist and scheduled to get a EGD due to fact that she is having a lot of problems with irritation to her stomach.  This procedure is both to be done in the next couple weeks.  Patient states that she is on a couple medications but she cannot remember what they are from the gastroenterologist.  Patient does have a history of getting her gallbladder removed and since then having this problem with her stomach.  Patient did have Covid in December and states that she has gotten her taste back but sometimes things taste different.  Patient denies any vomiting or inability to eat.  Patient's menstrual cycle is every month.    Past Medical History:   Diagnosis Date   • Anxiety    • Depression    • Migraine        Social History     Tobacco Use   • Smoking status: Former Smoker     Quit date: 2013     Years since quittin.8   • Smokeless tobacco: Never Used   Substance Use Topics   • Alcohol use: Yes     Comment: socially   • Drug use: No     Types: Methamphetamines     Comment: Last used 2013       No current [facility]-ordered outpatient medications on file.     No current [facility]-ordered facility-administered medications on file.       Allergies:  Patient has no known allergies.    Health Maintenance: Completed    ROS:  Gen: no fevers/chills  Eyes: no changes in vision  ENT: no sore throat, no hearing loss, no bloody nose  Pulm: no sob, no cough, patient denies any snoring or stop breathing when she is sleeping at night  CV: no chest pain, no palpitations  GI: no nausea/vomiting, no diarrhea  : no dysuria  MSk: no myalgias  Skin: no rash  Neuro: no headaches, no numbness/tingling  Heme/Lymph: no easy bruising    Objective:     Exam:  /62   Pulse 85   Temp 37.4 °C (99.3 °F)  "  Resp 16   Ht 1.6 m (5' 3\")   Wt 88.3 kg (194 lb 9.6 oz)   LMP 02/16/2021   SpO2 94%   BMI 34.47 kg/m²  Body mass index is 34.47 kg/m².    Gen: Alert and oriented, No apparent distress.  Skin: Warm and dry.  No obvious lesions.  Lungs: Normal effort, CTA bilaterally, no wheezes, rhonchi, or rales  CV: Regular rate and rhythm. No murmurs, rubs, or gallops.  ABD: Soft non-tender no organomegaly  Musculoskeletal: Normal gait. No extremity cyanosis, clubbing, or edema.  Neuro: Oriented to person, place and time  Psych: Mood is wnl         Assessment & Plan:     25 y.o. female with the following -     1. Weight gain  We will get lab work done recommend that patient start documenting what she is eating every day and if she is doing any physical activity and follow-up with me in a couple weeks.  This is a chronic problem  - TSH; Future  - FREE THYROXINE; Future    2. Fatigue, unspecified type  We will get lab work done and see her back this is a chronic  - CBC WITH DIFFERENTIAL; Future  - Comp Metabolic Panel; Future    3. Other chronic gastritis without hemorrhage  Patient to follow-up with GI for this this is a chronic problem    4. Tension-type headache, not intractable, unspecified chronicity pattern  Patient feels that her headaches have decreased so we will continue to monitor this this is a chronic problem    5. Wellness examination    - Lipid Profile; Future      Return in about 2 weeks (around 4/7/2021).    Please note that this dictation was created using voice recognition software. I have made every reasonable attempt to correct obvious errors, but I expect that there are errors of grammar and possibly content that I did not discover before finalizing the note.  "

## 2021-04-06 ENCOUNTER — HOSPITAL ENCOUNTER (OUTPATIENT)
Dept: LAB | Facility: MEDICAL CENTER | Age: 26
End: 2021-04-06
Attending: FAMILY MEDICINE
Payer: COMMERCIAL

## 2021-04-06 DIAGNOSIS — R53.83 FATIGUE, UNSPECIFIED TYPE: ICD-10-CM

## 2021-04-06 DIAGNOSIS — R63.5 WEIGHT GAIN: ICD-10-CM

## 2021-04-06 DIAGNOSIS — Z00.00 WELLNESS EXAMINATION: ICD-10-CM

## 2021-04-06 LAB
ALBUMIN SERPL BCP-MCNC: 4.6 G/DL (ref 3.2–4.9)
ALBUMIN/GLOB SERPL: 1.4 G/DL
ALP SERPL-CCNC: 64 U/L (ref 30–99)
ALT SERPL-CCNC: 25 U/L (ref 2–50)
ANION GAP SERPL CALC-SCNC: 8 MMOL/L (ref 7–16)
AST SERPL-CCNC: 24 U/L (ref 12–45)
BASOPHILS # BLD AUTO: 0.3 % (ref 0–1.8)
BASOPHILS # BLD: 0.02 K/UL (ref 0–0.12)
BILIRUB SERPL-MCNC: 0.6 MG/DL (ref 0.1–1.5)
BUN SERPL-MCNC: 6 MG/DL (ref 8–22)
CALCIUM SERPL-MCNC: 9.7 MG/DL (ref 8.5–10.5)
CHLORIDE SERPL-SCNC: 103 MMOL/L (ref 96–112)
CHOLEST SERPL-MCNC: 231 MG/DL (ref 100–199)
CO2 SERPL-SCNC: 27 MMOL/L (ref 20–33)
CREAT SERPL-MCNC: 0.54 MG/DL (ref 0.5–1.4)
EOSINOPHIL # BLD AUTO: 0.12 K/UL (ref 0–0.51)
EOSINOPHIL NFR BLD: 1.7 % (ref 0–6.9)
ERYTHROCYTE [DISTWIDTH] IN BLOOD BY AUTOMATED COUNT: 43.5 FL (ref 35.9–50)
FASTING STATUS PATIENT QL REPORTED: NORMAL
GLOBULIN SER CALC-MCNC: 3.3 G/DL (ref 1.9–3.5)
GLUCOSE SERPL-MCNC: 87 MG/DL (ref 65–99)
HCT VFR BLD AUTO: 39.9 % (ref 37–47)
HDLC SERPL-MCNC: 36 MG/DL
HGB BLD-MCNC: 12.8 G/DL (ref 12–16)
IMM GRANULOCYTES # BLD AUTO: 0.02 K/UL (ref 0–0.11)
IMM GRANULOCYTES NFR BLD AUTO: 0.3 % (ref 0–0.9)
LDLC SERPL CALC-MCNC: 159 MG/DL
LYMPHOCYTES # BLD AUTO: 2.75 K/UL (ref 1–4.8)
LYMPHOCYTES NFR BLD: 38.4 % (ref 22–41)
MCH RBC QN AUTO: 28.4 PG (ref 27–33)
MCHC RBC AUTO-ENTMCNC: 32.1 G/DL (ref 33.6–35)
MCV RBC AUTO: 88.5 FL (ref 81.4–97.8)
MONOCYTES # BLD AUTO: 0.37 K/UL (ref 0–0.85)
MONOCYTES NFR BLD AUTO: 5.2 % (ref 0–13.4)
NEUTROPHILS # BLD AUTO: 3.89 K/UL (ref 2–7.15)
NEUTROPHILS NFR BLD: 54.1 % (ref 44–72)
NRBC # BLD AUTO: 0 K/UL
NRBC BLD-RTO: 0 /100 WBC
PLATELET # BLD AUTO: 276 K/UL (ref 164–446)
PMV BLD AUTO: 10.8 FL (ref 9–12.9)
POTASSIUM SERPL-SCNC: 4.3 MMOL/L (ref 3.6–5.5)
PROT SERPL-MCNC: 7.9 G/DL (ref 6–8.2)
RBC # BLD AUTO: 4.51 M/UL (ref 4.2–5.4)
SODIUM SERPL-SCNC: 138 MMOL/L (ref 135–145)
TRIGL SERPL-MCNC: 178 MG/DL (ref 0–149)
WBC # BLD AUTO: 7.2 K/UL (ref 4.8–10.8)

## 2021-04-06 PROCEDURE — 85025 COMPLETE CBC W/AUTO DIFF WBC: CPT

## 2021-04-06 PROCEDURE — 84439 ASSAY OF FREE THYROXINE: CPT

## 2021-04-06 PROCEDURE — 36415 COLL VENOUS BLD VENIPUNCTURE: CPT

## 2021-04-06 PROCEDURE — 80061 LIPID PANEL: CPT

## 2021-04-06 PROCEDURE — 80053 COMPREHEN METABOLIC PANEL: CPT

## 2021-04-06 PROCEDURE — 84443 ASSAY THYROID STIM HORMONE: CPT

## 2021-04-07 ENCOUNTER — PATIENT MESSAGE (OUTPATIENT)
Dept: MEDICAL GROUP | Facility: PHYSICIAN GROUP | Age: 26
End: 2021-04-07

## 2021-04-07 LAB
T4 FREE SERPL-MCNC: 0.86 NG/DL (ref 0.93–1.7)
TSH SERPL DL<=0.005 MIU/L-ACNC: 8.11 UIU/ML (ref 0.38–5.33)

## 2021-04-07 RX ORDER — LEVOTHYROXINE SODIUM 0.03 MG/1
25 TABLET ORAL
Qty: 90 TABLET | Refills: 1 | Status: SHIPPED | OUTPATIENT
Start: 2021-04-07 | End: 2021-07-06

## 2021-04-16 ENCOUNTER — OFFICE VISIT (OUTPATIENT)
Dept: MEDICAL GROUP | Facility: PHYSICIAN GROUP | Age: 26
End: 2021-04-16
Payer: COMMERCIAL

## 2021-04-16 VITALS
WEIGHT: 191.2 LBS | OXYGEN SATURATION: 98 % | RESPIRATION RATE: 16 BRPM | DIASTOLIC BLOOD PRESSURE: 64 MMHG | SYSTOLIC BLOOD PRESSURE: 110 MMHG | BODY MASS INDEX: 33.88 KG/M2 | HEIGHT: 63 IN | HEART RATE: 65 BPM | TEMPERATURE: 98.4 F

## 2021-04-16 DIAGNOSIS — E03.9 HYPOTHYROIDISM, UNSPECIFIED TYPE: ICD-10-CM

## 2021-04-16 DIAGNOSIS — E78.89 LIPIDS ABNORMAL: ICD-10-CM

## 2021-04-16 DIAGNOSIS — Z00.00 WELLNESS EXAMINATION: ICD-10-CM

## 2021-04-16 DIAGNOSIS — K29.50 OTHER CHRONIC GASTRITIS WITHOUT HEMORRHAGE: ICD-10-CM

## 2021-04-16 PROCEDURE — 99214 OFFICE O/P EST MOD 30 MIN: CPT | Performed by: FAMILY MEDICINE

## 2021-04-16 ASSESSMENT — FIBROSIS 4 INDEX: FIB4 SCORE: 0.47

## 2021-04-16 NOTE — PROGRESS NOTES
"Subjective:     CC:   Chief Complaint   Patient presents with   • Follow-Up       HPI:   Juana presents today to follow-up on her labs.  Patient states she is still rather tired she did start taking the thyroid medication.  Patient was supposed to see GI but canceled the appointment.  Patient does not remember what to medication she was given by GI and states she really has not been taking it.  I stressed to the patient the importance of following up with them she states she will.  On this visit today I did go over all the labs I ordered along with discussing her TSH and T3 levels and her lipids    Past Medical History:   Diagnosis Date   • Anxiety    • Depression    • Migraine        Social History     Tobacco Use   • Smoking status: Former Smoker     Quit date: 2013     Years since quittin.8   • Smokeless tobacco: Never Used   Substance Use Topics   • Alcohol use: Yes     Comment: socially   • Drug use: No     Types: Methamphetamines     Comment: Last used 2013       Current Outpatient Medications Ordered in Epic   Medication Sig Dispense Refill   • levothyroxine (SYNTHROID) 25 MCG Tab Take 1 tablet by mouth every morning on an empty stomach for 90 days. 90 tablet 1     No current Epic-ordered facility-administered medications on file.       Allergies:  Patient has no known allergies.    Health Maintenance: Completed    ROS:  Gen: no fevers/chills  Pulm: no sob, no cough  CV: no chest pain, no palpitations  GI: no nausea/vomiting, no diarrhea  Skin: no rash  Neuro: no headaches, no numbness/tingling  Heme/Lymph: no easy bruising    Objective:     Exam:  /64   Pulse 65   Temp 36.9 °C (98.4 °F)   Resp 16   Ht 1.6 m (5' 3\")   Wt 86.7 kg (191 lb 3.2 oz)   LMP 2021   SpO2 98%   BMI 33.87 kg/m²  Body mass index is 33.87 kg/m².    Gen: Alert and oriented, No apparent distress.  Skin: Warm and dry.  No obvious lesions.  Eyes: Sclera wnl Pupils normal in size  Lungs: Normal " effort, CTA bilaterally, no wheezes, rhonchi, or rales  CV: Regular rate and rhythm. No murmurs, rubs, or gallops.  Musculoskeletal: Normal gait. No extremity cyanosis, clubbing, or edema.  Neuro: Oriented to person, place and time  Psych: Mood is wnl     Assessment & Plan:     25 y.o. female with the following -     1. Hypothyroidism, unspecified type  Patient to continue taking present medications patient to get her TSH rechecked in early June we will send her a reminder.  - TSH; Future  - FREE THYROXINE; Future    2. Other chronic gastritis without hemorrhage  Patient informed she needs to see gastroenterology which she is agreeable to doing she will follow up with them this is a chronic problem.    3. Lipids abnormal  I went over her labs her good cholesterol HDL is below 40 her  triglycerides and cholesterol are both elevated.  I discussed 1 thing she could do is increase physical activity which patient will try to do.  We will repeat her lipid panel again in 3 months    4. Wellness examination  Patient did have chickenpox as a child we will go ahead and order a varicella titer patient was agreeable with this plan  - VARICELLA ZOSTER IGG AB; Future      Return if symptoms worsen or fail to improve.    Please note that this dictation was created using voice recognition software. I have made every reasonable attempt to correct obvious errors, but I expect that there are errors of grammar and possibly content that I did not discover before finalizing the note.

## 2021-05-05 ENCOUNTER — TELEPHONE (OUTPATIENT)
Dept: MEDICAL GROUP | Facility: PHYSICIAN GROUP | Age: 26
End: 2021-05-05

## 2021-05-05 NOTE — TELEPHONE ENCOUNTER
Called pt to see if I can reschedule her appointment for a sooner opening. Left  for her to call back [phone].

## 2021-06-18 ENCOUNTER — APPOINTMENT (RX ONLY)
Dept: URBAN - METROPOLITAN AREA CLINIC 20 | Facility: CLINIC | Age: 26
Setting detail: DERMATOLOGY
End: 2021-06-18

## 2021-06-18 DIAGNOSIS — L91.8 OTHER HYPERTROPHIC DISORDERS OF THE SKIN: ICD-10-CM

## 2021-06-18 DIAGNOSIS — D22 MELANOCYTIC NEVI: ICD-10-CM

## 2021-06-18 DIAGNOSIS — B36.0 PITYRIASIS VERSICOLOR: ICD-10-CM

## 2021-06-18 PROBLEM — D22.4 MELANOCYTIC NEVI OF SCALP AND NECK: Status: ACTIVE | Noted: 2021-06-18

## 2021-06-18 PROBLEM — D22.5 MELANOCYTIC NEVI OF TRUNK: Status: ACTIVE | Noted: 2021-06-18

## 2021-06-18 PROBLEM — D48.5 NEOPLASM OF UNCERTAIN BEHAVIOR OF SKIN: Status: ACTIVE | Noted: 2021-06-18

## 2021-06-18 PROCEDURE — 99203 OFFICE O/P NEW LOW 30 MIN: CPT | Mod: 25

## 2021-06-18 PROCEDURE — ? PRESCRIPTION

## 2021-06-18 PROCEDURE — ? COUNSELING

## 2021-06-18 PROCEDURE — ? KOH PREP

## 2021-06-18 PROCEDURE — 11102 TANGNTL BX SKIN SINGLE LES: CPT

## 2021-06-18 PROCEDURE — ? BIOPSY BY SHAVE METHOD

## 2021-06-18 RX ORDER — TRIAMCINOLONE ACETONIDE 1 MG/G
CREAM TOPICAL
Qty: 1 | Refills: 2 | Status: ERX | COMMUNITY
Start: 2021-06-18

## 2021-06-18 RX ADMIN — TRIAMCINOLONE ACETONIDE 1: 1 CREAM TOPICAL at 00:00

## 2021-06-18 ASSESSMENT — LOCATION SIMPLE DESCRIPTION DERM
LOCATION SIMPLE: LEFT CLAVICULAR SKIN
LOCATION SIMPLE: LEFT ANTERIOR NECK
LOCATION SIMPLE: LEFT UPPER BACK
LOCATION SIMPLE: RIGHT UPPER BACK
LOCATION SIMPLE: POSTERIOR NECK

## 2021-06-18 ASSESSMENT — LOCATION DETAILED DESCRIPTION DERM
LOCATION DETAILED: LEFT CLAVICULAR NECK
LOCATION DETAILED: LEFT CLAVICULAR SKIN
LOCATION DETAILED: RIGHT MEDIAL TRAPEZIAL NECK
LOCATION DETAILED: LEFT SUPERIOR UPPER BACK
LOCATION DETAILED: RIGHT SUPERIOR UPPER BACK
LOCATION DETAILED: LEFT SUPERIOR LATERAL NECK

## 2021-06-18 ASSESSMENT — LOCATION ZONE DERM
LOCATION ZONE: NECK
LOCATION ZONE: TRUNK

## 2021-06-18 NOTE — PROCEDURE: BIOPSY BY SHAVE METHOD
Detail Level: Detailed
Depth Of Biopsy: dermis
Was A Bandage Applied: Yes
Size Of Lesion In Cm: 0.5
X Size Of Lesion In Cm: 0.4
Biopsy Type: H and E
Biopsy Method: Dermablade
Anesthesia Type: 1% lidocaine with epinephrine
Additional Anesthesia Volume In Cc (Will Not Render If 0): 0
Hemostasis: Drysol
Wound Care: Petrolatum
Dressing: bandage
Destruction After The Procedure: No
Type Of Destruction Used: Electrodesiccation and Curettage
Curettage Text: The wound bed was treated with curettage after the biopsy was performed.
Cryotherapy Text: The wound bed was treated with cryotherapy after the biopsy was performed.
Electrodesiccation Text: The wound bed was treated with electrodesiccation after the biopsy was performed.
Electrodesiccation And Curettage Text: The wound bed was treated with electrodesiccation and curettage after the biopsy was performed. Treated x 3
Silver Nitrate Text: The wound bed was treated with silver nitrate after the biopsy was performed.
Lab: [id number]
Lab Facility: 
Consent: Written consent was obtained and risks were reviewed including but not limited to scarring, infection, bleeding, scabbing, incomplete removal, nerve damage and allergy to anesthesia.
Post-Care Instructions: I reviewed with the patient in detail post-care instructions. Patient is to keep the biopsy site dry overnight, and then apply bacitracin twice daily until healed. Patient may apply hydrogen peroxide soaks to remove any crusting.
Notification Instructions: Patient will be notified of biopsy results. However, patient instructed to call the office if not contacted within 2 weeks.
Billing Type: Third-Party Bill
Information: Selecting Yes will display possible errors in your note based on the variables you have selected. This validation is only offered as a suggestion for you. PLEASE NOTE THAT THE VALIDATION TEXT WILL BE REMOVED WHEN YOU FINALIZE YOUR NOTE. IF YOU WANT TO FAX A PRELIMINARY NOTE YOU WILL NEED TO TOGGLE THIS TO 'NO' IF YOU DO NOT WANT IT IN YOUR FAXED NOTE.

## 2021-07-08 RX ORDER — LEVOTHYROXINE SODIUM 0.03 MG/1
25 TABLET ORAL
Qty: 90 TABLET | Refills: 0 | Status: SHIPPED | OUTPATIENT
Start: 2021-07-08 | End: 2021-12-28

## 2021-07-16 ENCOUNTER — HOSPITAL ENCOUNTER (OUTPATIENT)
Dept: LAB | Facility: MEDICAL CENTER | Age: 26
End: 2021-07-16
Attending: FAMILY MEDICINE
Payer: COMMERCIAL

## 2021-07-16 DIAGNOSIS — E03.9 HYPOTHYROIDISM, UNSPECIFIED TYPE: ICD-10-CM

## 2021-07-16 DIAGNOSIS — Z00.00 WELLNESS EXAMINATION: ICD-10-CM

## 2021-07-16 LAB
T4 FREE SERPL-MCNC: 1.16 NG/DL (ref 0.93–1.7)
TSH SERPL DL<=0.005 MIU/L-ACNC: 4.59 UIU/ML (ref 0.38–5.33)
VZV IGG SER IA-ACNC: 1.3

## 2021-07-16 PROCEDURE — 36415 COLL VENOUS BLD VENIPUNCTURE: CPT

## 2021-07-16 PROCEDURE — 84443 ASSAY THYROID STIM HORMONE: CPT

## 2021-07-16 PROCEDURE — 84439 ASSAY OF FREE THYROXINE: CPT

## 2021-07-16 PROCEDURE — 86787 VARICELLA-ZOSTER ANTIBODY: CPT

## 2021-08-03 RX ORDER — METRONIDAZOLE 500 MG/1
500 TABLET ORAL
Qty: 14 TABLET | Refills: 0 | Status: SHIPPED | OUTPATIENT
Start: 2021-08-03 | End: 2021-08-10

## 2021-08-03 RX ORDER — CLINDAMYCIN PHOSPHATE 20 MG/G
CREAM VAGINAL
Qty: 5 EACH | Refills: 0 | Status: SHIPPED | OUTPATIENT
Start: 2021-08-03 | End: 2022-01-16

## 2021-09-09 ENCOUNTER — APPOINTMENT (OUTPATIENT)
Dept: MEDICAL GROUP | Facility: PHYSICIAN GROUP | Age: 26
End: 2021-09-09
Payer: COMMERCIAL

## 2021-09-13 ENCOUNTER — TELEPHONE (OUTPATIENT)
Dept: MEDICAL GROUP | Facility: PHYSICIAN GROUP | Age: 26
End: 2021-09-13

## 2021-09-13 ENCOUNTER — HOSPITAL ENCOUNTER (OUTPATIENT)
Facility: MEDICAL CENTER | Age: 26
End: 2021-09-13
Attending: INTERNAL MEDICINE
Payer: COMMERCIAL

## 2021-09-13 ENCOUNTER — OFFICE VISIT (OUTPATIENT)
Dept: MEDICAL GROUP | Facility: PHYSICIAN GROUP | Age: 26
End: 2021-09-13
Payer: COMMERCIAL

## 2021-09-13 ENCOUNTER — HOSPITAL ENCOUNTER (OUTPATIENT)
Dept: RADIOLOGY | Facility: MEDICAL CENTER | Age: 26
End: 2021-09-13
Attending: INTERNAL MEDICINE
Payer: COMMERCIAL

## 2021-09-13 VITALS — HEIGHT: 63 IN | BODY MASS INDEX: 33.87 KG/M2

## 2021-09-13 DIAGNOSIS — R05.9 COUGH: ICD-10-CM

## 2021-09-13 LAB
COVID ORDER STATUS COVID19: NORMAL
FLUAV+FLUBV AG SPEC QL IA: POSITIVE
INT CON NEG: NEGATIVE
INT CON NEG: NEGATIVE
INT CON POS: POSITIVE
INT CON POS: POSITIVE
S PYO AG THROAT QL: NEGATIVE

## 2021-09-13 PROCEDURE — 99213 OFFICE O/P EST LOW 20 MIN: CPT | Performed by: INTERNAL MEDICINE

## 2021-09-13 PROCEDURE — 87880 STREP A ASSAY W/OPTIC: CPT | Performed by: INTERNAL MEDICINE

## 2021-09-13 PROCEDURE — 71046 X-RAY EXAM CHEST 2 VIEWS: CPT

## 2021-09-13 PROCEDURE — 87804 INFLUENZA ASSAY W/OPTIC: CPT | Performed by: INTERNAL MEDICINE

## 2021-09-13 PROCEDURE — U0003 INFECTIOUS AGENT DETECTION BY NUCLEIC ACID (DNA OR RNA); SEVERE ACUTE RESPIRATORY SYNDROME CORONAVIRUS 2 (SARS-COV-2) (CORONAVIRUS DISEASE [COVID-19]), AMPLIFIED PROBE TECHNIQUE, MAKING USE OF HIGH THROUGHPUT TECHNOLOGIES AS DESCRIBED BY CMS-2020-01-R: HCPCS

## 2021-09-13 PROCEDURE — U0005 INFEC AGEN DETEC AMPLI PROBE: HCPCS

## 2021-09-13 RX ORDER — BENZONATATE 100 MG/1
100 CAPSULE ORAL 3 TIMES DAILY PRN
Qty: 60 CAPSULE | Refills: 0 | Status: SHIPPED | OUTPATIENT
Start: 2021-09-13 | End: 2022-01-16

## 2021-09-13 RX ORDER — OSELTAMIVIR PHOSPHATE 75 MG/1
75 CAPSULE ORAL 2 TIMES DAILY
Qty: 10 CAPSULE | Refills: 0 | Status: SHIPPED | OUTPATIENT
Start: 2021-09-13 | End: 2022-01-16

## 2021-09-13 NOTE — PROGRESS NOTES
"CC: Cough, sore throat      HPI: This is a 26 y.o. pt.  Pt's medical history is notable for:     Cough  This is a new condition.  Symptom noted 6 days ago.  Patient request to be tested for Covid  No recent known Covid exposure  Patient also reported that her 4-year-old daughter also had similar symptoms.  Patient denies any fever or chills shortness of breath or wheezing.  Cough is mainly dry associated with some sore throat and body aches and slight headaches.          REVIEW OF SYSTEMS:     Constitutional:  no fever / chills   Neurologic: no headaches  Eyes: no changes in vision  ENT: no sore throat, no hearing loss  CV:  no chest pain, no palpitations  Pulmonary: no SOB, no cough          Allergies: Patient has no known allergies.    Current Outpatient Medications Ordered in Epic   Medication Sig Dispense Refill   • benzonatate (TESSALON) 100 MG Cap Take 1 Capsule by mouth 3 times a day as needed for Cough. 60 Capsule 0   • levothyroxine (SYNTHROID) 25 MCG Tab Take 1 tablet by mouth every morning on an empty stomach for 90 days. 90 tablet 0   • clindamycin (CLEOCIN) 2 % vaginal cream Insert cream  into the vaginal at bedtime for 5 nights (Patient not taking: Reported on 9/13/2021) 5 Each 0     No current [facility]-ordered facility-administered medications on file.       Past Medical, Social, and Family history reviewed and updated in EPIC     ------------------------------------------------------------------------------     PHYSICAL EXAM:   There were no vitals filed for this visit.     Vitals:    09/13/21 0944   Height: 1.6 m (5' 3\")         Body mass index is 33.87 kg/m².    Constitutional: no acute distress  CV: heart RRR  Resp: normal effort, no wheezing or rales.  GI: abdomen soft, no obvious mass, no tenderness  Neuro: CN 2-12 grossly intact  Nose with mild mucosal formation no purulent drainage oropharynx with slight erythema no " exudates      -----------------------------------------------------------------------------    ASSESSMENT:   1. Cough  SARS-CoV-2, PCR (In-House): Collect NP OR nasal swab in VTM    POCT Rapid Strep A    POCT Influenza A/B    DX-CHEST-2 VIEWS           MEDICAL DECISION MAKING: DISCUSSION / STATUS / PLAN:    Signs and symptoms consistent with viral syndrome  Covid testing ordered along with rapid strep screen and influenza.  Symptomatic treatment advised.  Increase fluid intake salt water gargle 3 times daily.  Tessalon 100 mg 3 times daily as needed for cough prescribed.  Chest x-ray requested.  Advised the patient to follow-up with her PCP Dr Lu Lin         -If any problems should arise, patient was advised to contact our office or go to ER to be evaluated.    Please note that this dictation was created using voice recognition software. I have made every reasonable attempt to correct obvious errors, but it is possible there are errors of grammar and possibly content that I did not discover before finalizing the note.

## 2021-09-13 NOTE — TELEPHONE ENCOUNTER
----- Message from Wilmer Zavaleta M.D. sent at 9/13/2021 10:43 AM PDT -----  Please notify patient about their results:    Test came back posititive for influenza B infection  Rec pt to start taking Tamiflu 75mg bid x 5d  Rx sent to pharm    Pls advise pt to [facility] pcp Dr Lu Lin.

## 2021-09-13 NOTE — ASSESSMENT & PLAN NOTE
This is a new condition.  Symptom noted 6 days ago.  Patient request to be tested for Covid  No recent known Covid exposure  Patient also reported that her 4-year-old daughter also had similar symptoms.  Patient denies any fever or chills shortness of breath or wheezing.  Cough is mainly dry associated with some sore throat and body aches and slight headaches.

## 2021-09-14 LAB
SARS-COV-2 RNA RESP QL NAA+PROBE: NOTDETECTED
SPECIMEN SOURCE: NORMAL

## 2022-01-16 ENCOUNTER — OFFICE VISIT (OUTPATIENT)
Dept: URGENT CARE | Facility: CLINIC | Age: 27
End: 2022-01-16
Payer: COMMERCIAL

## 2022-01-16 ENCOUNTER — HOSPITAL ENCOUNTER (OUTPATIENT)
Facility: MEDICAL CENTER | Age: 27
End: 2022-01-16
Attending: NURSE PRACTITIONER
Payer: COMMERCIAL

## 2022-01-16 VITALS
OXYGEN SATURATION: 98 % | HEIGHT: 63 IN | TEMPERATURE: 97.8 F | SYSTOLIC BLOOD PRESSURE: 120 MMHG | WEIGHT: 170 LBS | BODY MASS INDEX: 30.12 KG/M2 | DIASTOLIC BLOOD PRESSURE: 78 MMHG | HEART RATE: 77 BPM | RESPIRATION RATE: 16 BRPM

## 2022-01-16 DIAGNOSIS — N89.8 VAGINAL DISCHARGE: ICD-10-CM

## 2022-01-16 DIAGNOSIS — N30.01 ACUTE CYSTITIS WITH HEMATURIA: ICD-10-CM

## 2022-01-16 LAB
APPEARANCE UR: CLEAR
BILIRUB UR STRIP-MCNC: NEGATIVE MG/DL
COLOR UR AUTO: NORMAL
GLUCOSE UR STRIP.AUTO-MCNC: NEGATIVE MG/DL
INT CON NEG: NEGATIVE
INT CON POS: POSITIVE
KETONES UR STRIP.AUTO-MCNC: NEGATIVE MG/DL
LEUKOCYTE ESTERASE UR QL STRIP.AUTO: NORMAL
NITRITE UR QL STRIP.AUTO: POSITIVE
PH UR STRIP.AUTO: 5 [PH] (ref 5–8)
POC URINE PREGNANCY TEST: NEGATIVE
PROT UR QL STRIP: NEGATIVE MG/DL
RBC UR QL AUTO: NORMAL
SP GR UR STRIP.AUTO: 1.02
UROBILINOGEN UR STRIP-MCNC: 0.2 MG/DL

## 2022-01-16 PROCEDURE — 87591 N.GONORRHOEAE DNA AMP PROB: CPT

## 2022-01-16 PROCEDURE — 87086 URINE CULTURE/COLONY COUNT: CPT

## 2022-01-16 PROCEDURE — 87480 CANDIDA DNA DIR PROBE: CPT

## 2022-01-16 PROCEDURE — 99214 OFFICE O/P EST MOD 30 MIN: CPT | Performed by: NURSE PRACTITIONER

## 2022-01-16 PROCEDURE — 87491 CHLMYD TRACH DNA AMP PROBE: CPT

## 2022-01-16 PROCEDURE — 87510 GARDNER VAG DNA DIR PROBE: CPT

## 2022-01-16 PROCEDURE — 81025 URINE PREGNANCY TEST: CPT | Performed by: NURSE PRACTITIONER

## 2022-01-16 PROCEDURE — 81002 URINALYSIS NONAUTO W/O SCOPE: CPT | Performed by: NURSE PRACTITIONER

## 2022-01-16 PROCEDURE — 87660 TRICHOMONAS VAGIN DIR PROBE: CPT

## 2022-01-16 RX ORDER — NITROFURANTOIN 25; 75 MG/1; MG/1
100 CAPSULE ORAL 2 TIMES DAILY
Qty: 10 CAPSULE | Refills: 0 | Status: SHIPPED | OUTPATIENT
Start: 2022-01-16 | End: 2022-01-21

## 2022-01-16 ASSESSMENT — ENCOUNTER SYMPTOMS
FEVER: 0
MYALGIAS: 0
SHORTNESS OF BREATH: 0
NAUSEA: 0
CHILLS: 0
HEADACHES: 0
SORE THROAT: 0
ABDOMINAL PAIN: 0
DIZZINESS: 0
EYE PAIN: 0
COUGH: 0
FLANK PAIN: 0
VOMITING: 0

## 2022-01-16 ASSESSMENT — FIBROSIS 4 INDEX: FIB4 SCORE: 0.49

## 2022-01-16 NOTE — LETTER
January 16, 2022         Patient: Juana Infante   YOB: 1995   Date of Visit: 1/16/2022           To Whom it May Concern:    Juana Infante was seen in my clinic on 1/16/2022. She may return to work on 1/18/22.    If you have any questions or concerns, please don't hesitate to call.        Sincerely,           ARELI Acosta.  Electronically Signed

## 2022-01-17 DIAGNOSIS — N30.01 ACUTE CYSTITIS WITH HEMATURIA: ICD-10-CM

## 2022-01-17 DIAGNOSIS — N89.8 VAGINAL DISCHARGE: ICD-10-CM

## 2022-01-17 NOTE — PROGRESS NOTES
Subjective:   Juana Infante is a 26 y.o. female who presents for UTI (started last night, frequency, dysuria, taking azo)      UTI  This is a new problem. The current episode started yesterday. The problem occurs constantly. The problem has been unchanged. Associated symptoms include urinary symptoms. Pertinent negatives include no abdominal pain, chest pain, chills, congestion, coughing, fever, headaches, myalgias, nausea, rash, sore throat or vomiting. Associated symptoms comments: Vaginal discharge  . Nothing aggravates the symptoms. Treatments tried: azo. The treatment provided no relief.       Review of Systems   Constitutional: Negative for chills and fever.   HENT: Negative for congestion and sore throat.    Eyes: Negative for pain.   Respiratory: Negative for cough and shortness of breath.    Cardiovascular: Negative for chest pain.   Gastrointestinal: Negative for abdominal pain, nausea and vomiting.   Genitourinary: Positive for dysuria. Negative for flank pain, frequency, hematuria and urgency.        Vaginal discharge     Musculoskeletal: Negative for myalgias.   Skin: Negative for rash.   Neurological: Negative for dizziness and headaches.       Medications:    • levothyroxine Tabs  • nitrofurantoin Caps    Allergies: Patient has no known allergies.    Problem List: Juana Infante does not have any pertinent problems on file.    Surgical History:  Past Surgical History:   Procedure Laterality Date   • GIANFRANCO BY LAPAROSCOPY N/A 10/13/2020    Procedure: CHOLECYSTECTOMY, LAPAROSCOPIC;  Surgeon: Spencer Nur D.O.;  Location: SURGERY [facility];  Service: General   • TONSILLECTOMY         Past Social Hx: Juana Infante  reports that she quit smoking about 8 years ago. She has never used smokeless tobacco. She reports current alcohol use. She reports that she does not use drugs.     Past Family Hx:  Juana Infante family history is not on file.     Problem list, medications, and allergies reviewed  "by myself today in Epic.     Objective:     /78   Pulse 77   Temp 36.6 °C (97.8 °F) (Temporal)   Resp 16   Ht 1.6 m (5' 3\")   Wt 77.1 kg (170 lb)   SpO2 98%   BMI 30.11 kg/m²     Physical Exam  Constitutional:       Appearance: Normal appearance. She is not ill-appearing or toxic-appearing.   HENT:      Head: Normocephalic.      Right Ear: External ear normal.      Left Ear: External ear normal.      Nose: Nose normal.      Mouth/Throat:      Lips: Pink.      Mouth: Mucous membranes are moist.   Eyes:      General: Lids are normal.         Right eye: No discharge.         Left eye: No discharge.   Pulmonary:      Effort: Pulmonary effort is normal. No accessory muscle usage or respiratory distress.   Abdominal:      General: Bowel sounds are normal.      Tenderness: There is no abdominal tenderness. There is no right CVA tenderness or left CVA tenderness.   Genitourinary:     Comments: Deferred exam     Musculoskeletal:         General: Normal range of motion.      Cervical back: Full passive range of motion without pain.   Skin:     Coloration: Skin is not pale.   Neurological:      Mental Status: She is alert and oriented to person, place, and time.   Psychiatric:         Mood and Affect: Mood normal.         Thought Content: Thought content normal.         Assessment/Plan:     Diagnosis and associated orders:     1. Acute cystitis with hematuria  POCT Urinalysis    POCT Pregnancy    URINE CULTURE(NEW)    nitrofurantoin (MACROBID) 100 MG Cap   2. Vaginal discharge  CHLAMYDIA/GC PCR URINE OR SWAB    VAGINAL PATHOGENS DNA PANEL      Comments/MDM:     Pt. Was given ABX therapy today and will change therapy if cultures indicates this is necessary. ER precautions given- worsening symptoms, back pain, abd. Pain, or fevers.   Pt. Is to increase fluids, and take the complete duration of the therapy.   •  Differential diagnosis, natural history, supportive care, and indications for immediate follow-up " discussed.     •            My total time spent caring for the patient on the day of the encounter was 30 minutes.   This does not include time spent on separately billable procedures/tests.  Please note that this dictation was created using voice recognition software. I have made a reasonable attempt to correct obvious errors, but I expect that there are errors of grammar and possibly content that I did not discover before finalizing the note.    This note was electronically signed by Harsh HERR.

## 2022-01-18 LAB
C TRACH DNA SPEC QL NAA+PROBE: NEGATIVE
CANDIDA DNA VAG QL PROBE+SIG AMP: NEGATIVE
G VAGINALIS DNA VAG QL PROBE+SIG AMP: POSITIVE
N GONORRHOEA DNA SPEC QL NAA+PROBE: NEGATIVE
SPECIMEN SOURCE: NORMAL
T VAGINALIS DNA VAG QL PROBE+SIG AMP: NEGATIVE

## 2022-01-20 LAB
BACTERIA UR CULT: NORMAL
SIGNIFICANT IND 70042: NORMAL
SITE SITE: NORMAL
SOURCE SOURCE: NORMAL

## 2022-01-21 RX ORDER — CLINDAMYCIN PHOSPHATE 20 MG/G
CREAM VAGINAL
Qty: 1 EACH | Refills: 0 | Status: SHIPPED | OUTPATIENT
Start: 2022-01-21 | End: 2022-01-25

## 2022-03-13 ENCOUNTER — OFFICE VISIT (OUTPATIENT)
Dept: URGENT CARE | Facility: PHYSICIAN GROUP | Age: 27
End: 2022-03-13
Payer: COMMERCIAL

## 2022-03-13 VITALS
TEMPERATURE: 98.2 F | BODY MASS INDEX: 31.01 KG/M2 | HEIGHT: 63 IN | WEIGHT: 175 LBS | RESPIRATION RATE: 16 BRPM | DIASTOLIC BLOOD PRESSURE: 74 MMHG | SYSTOLIC BLOOD PRESSURE: 122 MMHG | HEART RATE: 70 BPM | OXYGEN SATURATION: 96 %

## 2022-03-13 DIAGNOSIS — R11.10 VOMITING, INTRACTABILITY OF VOMITING NOT SPECIFIED, PRESENCE OF NAUSEA NOT SPECIFIED, UNSPECIFIED VOMITING TYPE: ICD-10-CM

## 2022-03-13 DIAGNOSIS — K29.00 ACUTE GASTRITIS WITHOUT HEMORRHAGE, UNSPECIFIED GASTRITIS TYPE: ICD-10-CM

## 2022-03-13 LAB
APPEARANCE UR: NORMAL
BILIRUB UR STRIP-MCNC: NEGATIVE MG/DL
COLOR UR AUTO: YELLOW
GLUCOSE UR STRIP.AUTO-MCNC: NEGATIVE MG/DL
INT CON NEG: NORMAL
INT CON POS: NORMAL
KETONES UR STRIP.AUTO-MCNC: NEGATIVE MG/DL
LEUKOCYTE ESTERASE UR QL STRIP.AUTO: NORMAL
NITRITE UR QL STRIP.AUTO: NEGATIVE
PH UR STRIP.AUTO: 7 [PH] (ref 5–8)
POC URINE PREGNANCY TEST: NEGATIVE
PROT UR QL STRIP: NEGATIVE MG/DL
RBC UR QL AUTO: NEGATIVE
SP GR UR STRIP.AUTO: 1.02
UROBILINOGEN UR STRIP-MCNC: 0.2 MG/DL

## 2022-03-13 PROCEDURE — 81025 URINE PREGNANCY TEST: CPT | Performed by: FAMILY MEDICINE

## 2022-03-13 PROCEDURE — 99214 OFFICE O/P EST MOD 30 MIN: CPT | Performed by: FAMILY MEDICINE

## 2022-03-13 PROCEDURE — 81002 URINALYSIS NONAUTO W/O SCOPE: CPT | Performed by: FAMILY MEDICINE

## 2022-03-13 RX ORDER — ONDANSETRON 4 MG/1
8 TABLET, ORALLY DISINTEGRATING ORAL ONCE
Status: COMPLETED | OUTPATIENT
Start: 2022-03-13 | End: 2022-03-13

## 2022-03-13 RX ORDER — ALUMINA, MAGNESIA, AND SIMETHICONE 2400; 2400; 240 MG/30ML; MG/30ML; MG/30ML
10 SUSPENSION ORAL ONCE
Status: COMPLETED | OUTPATIENT
Start: 2022-03-13 | End: 2022-03-13

## 2022-03-13 RX ORDER — ONDANSETRON 4 MG/1
4 TABLET, FILM COATED ORAL EVERY 4 HOURS PRN
Qty: 10 TABLET | Refills: 0 | Status: SHIPPED | OUTPATIENT
Start: 2022-03-13 | End: 2022-03-16

## 2022-03-13 RX ADMIN — ALUMINA, MAGNESIA, AND SIMETHICONE 10 ML: 2400; 2400; 240 SUSPENSION ORAL at 14:47

## 2022-03-13 RX ADMIN — ONDANSETRON 8 MG: 4 TABLET, ORALLY DISINTEGRATING ORAL at 14:46

## 2022-03-13 ASSESSMENT — ENCOUNTER SYMPTOMS
VOMITING: 1
FEVER: 1
MYALGIAS: 0
NAUSEA: 1
COUGH: 0
SHORTNESS OF BREATH: 0
SORE THROAT: 0
DIZZINESS: 0
CHILLS: 0

## 2022-03-13 ASSESSMENT — FIBROSIS 4 INDEX: FIB4 SCORE: 0.49

## 2022-03-13 NOTE — LETTER
March 13, 2022         Patient: Juana Infante   YOB: 1995   Date of Visit: 3/13/2022           To Whom it May Concern:    Juana Infante was seen in my clinic on 3/13/2022. Excuse 3/13/2022, 3/14/2022, 3/15/2022.    If you have any questions or concerns, please don't hesitate to call.        Sincerely,           Lino Thompson M.D.  Electronically Signed

## 2022-03-13 NOTE — PROGRESS NOTES
Subjective:   Juana Infante is a 26 y.o. female who presents for Other (Vomiting, fever, upper stomach ain, body aches and weak x 1 day )        Other  Chronicity: Reports vomiting last night which resolved this morning at 8:30 AM, reports midepigastric aching similar to previous episodes gastritis, reports subjective fever last night. Associated symptoms include a fever, nausea and vomiting. Pertinent negatives include no chills, coughing, myalgias, rash or sore throat. Exacerbated by: Exposed to family members with similar symptoms over the past week. She has tried rest and drinking for the symptoms. The treatment provided no relief.     PMH:  has a past medical history of Anxiety (2013), Depression (2013), and Migraine.  MEDS:   Current Outpatient Medications:   •  ondansetron (ZOFRAN) 4 MG Tab tablet, Take 1 Tablet by mouth every four hours as needed for Nausea/Vomiting for up to 3 days., Disp: 10 Tablet, Rfl: 0  •  levothyroxine (SYNTHROID) 25 MCG Tab, Take 1 Tablet by mouth every morning on an empty stomach for 90 days., Disp: 90 Tablet, Rfl: 1    Current Facility-Administered Medications:   •  ondansetron (ZOFRAN ODT) dispertab 8 mg, 8 mg, Oral, Once, Lino Thompson M.D.  •  mag hydrox-al hydrox-simeth (MAALOX PLUS ES or MYLANTA DS) suspension 10 mL, 10 mL, Oral, Once, Lino Thompson M.D.  ALLERGIES: No Known Allergies  SURGHX:   Past Surgical History:   Procedure Laterality Date   • GIANFRANCO BY LAPAROSCOPY N/A 10/13/2020    Procedure: CHOLECYSTECTOMY, LAPAROSCOPIC;  Surgeon: Spencer Nur D.O.;  Location: SURGERY [facility];  Service: General   • TONSILLECTOMY       SOCHX:  reports that she quit smoking about 8 years ago. She has never used smokeless tobacco. She reports current alcohol use. She reports that she does not use drugs.  FH: History reviewed. No pertinent family history.  Review of Systems   Constitutional: Positive for fever. Negative for chills.   HENT: Negative for sore throat.   "  Respiratory: Negative for cough and shortness of breath.    Gastrointestinal: Positive for nausea and vomiting.   Musculoskeletal: Negative for myalgias.   Skin: Negative for rash.   Neurological: Negative for dizziness.        Objective:   /74 (BP Location: Left arm, Patient Position: Sitting, BP Cuff Size: Adult)   Pulse 70   Temp 36.8 °C (98.2 °F) (Temporal)   Resp 16   Ht 1.6 m (5' 3\")   Wt 79.4 kg (175 lb)   SpO2 96%   BMI 31.00 kg/m²   Physical Exam  Vitals and nursing note reviewed.   Constitutional:       General: She is not in acute distress.     Appearance: She is well-developed.   HENT:      Head: Normocephalic and atraumatic.      Right Ear: External ear normal.      Left Ear: External ear normal.      Nose: Nose normal.      Mouth/Throat:      Mouth: Mucous membranes are moist.   Eyes:      Conjunctiva/sclera: Conjunctivae normal.   Cardiovascular:      Rate and Rhythm: Normal rate.   Pulmonary:      Effort: Pulmonary effort is normal. No respiratory distress.      Breath sounds: Normal breath sounds.   Abdominal:      General: There is no distension.      Tenderness: There is no abdominal tenderness. There is no right CVA tenderness, left CVA tenderness or guarding.   Musculoskeletal:         General: Normal range of motion.   Skin:     General: Skin is warm and dry.   Neurological:      General: No focal deficit present.      Mental Status: She is alert and oriented to person, place, and time. Mental status is at baseline.      Gait: Gait (gait at baseline) normal.   Psychiatric:         Judgment: Judgment normal.           Assessment/Plan:   1. Acute gastritis without hemorrhage, unspecified gastritis type  - mag hydrox-al hydrox-simeth (MAALOX PLUS ES or MYLANTA DS) suspension 10 mL    2. Vomiting, intractability of vomiting not specified, presence of nausea not specified, unspecified vomiting type  - POCT Urinalysis  - POCT Pregnancy  - ondansetron (ZOFRAN ODT) dispertab 8 mg  - " ondansetron (ZOFRAN) 4 MG Tab tablet; Take 1 Tablet by mouth every four hours as needed for Nausea/Vomiting for up to 3 days.  Dispense: 10 Tablet; Refill: 0      Medical decision-making/course: The patient remained afebrile, hemodynamically and neurologically stable with benign abdominal exam and no evidence of respiratory compromise throughout the urgent care course.  There was no immediate clinical indication for the necessity of emergency department evaluation or inpatient admission and the patient was amendable to a trial of outpatient management.            In the course of preparing for this visit with review of the pertinent past medical history, recent and past clinic visits, current medications, and performing chart, immunization, medical history and medication reconciliation, and in the further course of obtaining the current history pertinent to the clinic visit today, performing an exam and evaluation, ordering and independently evaluating labs, tests including point-of-care urinalysis and point-of-care urine pregnancy, and/or procedures, prescribing any recommended new medications as noted above, providing any pertinent counseling and education and recommending further coordination of care including drafting a work excuse letter, at least  35 minutes of total time were spent during this encounter.      Discussed close monitoring, return precautions, and supportive measures of maintaining adequate fluid hydration and caloric intake, relative rest and symptom management as needed for pain and/or fever.    Differential diagnosis, natural history, supportive care, and indications for immediate follow-up discussed.     Advised the patient to follow-up with the primary care physician for recheck, reevaluation, and consideration of further management.    Please note that this dictation was created using voice recognition software. I have worked with consultants from the vendor as well as technical experts  from [facility] to optimize the interface. I have made every reasonable attempt to correct obvious errors, but I expect that there are errors of grammar and possibly content that I did not discover before finalizing the note.

## 2022-03-13 NOTE — PATIENT INSTRUCTIONS
Vomiting, Adult  Vomiting occurs when stomach contents are thrown up and out of the mouth. Many people notice nausea before vomiting. Vomiting can make you feel weak and cause you to become dehydrated. Dehydration can make you feel tired and thirsty, cause you to have a dry mouth, and decrease how often you urinate. Older adults and people who have other diseases or a weak body defense system (immune system) are at higher risk for dehydration. It is important to treat vomiting as told by your health care provider.  Follow these instructions at home:    Eating and drinking         Follow these recommendations as told by your health care provider:  · Take an oral rehydration solution (ORS). This is a drink that is sold at pharmacies and retail stores.  · Eat bland, easy-to-digest foods in small amounts as you are able. These foods include bananas, applesauce, rice, lean meats, toast, and crackers.  · Drink clear fluids slowly and in small amounts as you are able. Clear fluids include water, ice chips, low-calorie sports drinks, and fruit juice that has water added (diluted fruit juice).  · Avoid drinking fluids that contain a lot of sugar or caffeine, such as energy drinks, sports drinks, and soda.  · Avoid alcohol.  · Avoid spicy or fatty foods.    General instructions  · Wash your hands often using soap and water. If soap and water are not available, use hand . Make sure that everyone in your household washes their hands frequently.  · Take over-the-counter and prescription medicines only as told by your health care provider.  · Rest at home while you recover.  · Watch your condition for any changes.  · Keep all follow-up visits as told by your health care provider. This is important.  Contact a health care provider if:  · Your vomiting gets worse.  · You have new symptoms.  · You have a fever.  · You cannot drink fluids without vomiting.  · You feel light-headed or dizzy.  · You have a headache.  · You  have muscle cramps.  · You have a rash.  · You have pain while urinating.  Get help right away if:  · You have pain in your chest, neck, arm, or jaw.  · You feel extremely weak or you faint.  · You have persistent vomiting.  · You have vomit that is bright red or looks like black coffee grounds.  · You have stools that are bloody or black, or stools that look like tar.  · You have a severe headache, a stiff neck, or both.  · You have severe pain, cramping, or bloating in your abdomen.  · You have trouble breathing or you are breathing very quickly.  · Your heart is beating very quickly.  · Your skin feels cold and clammy.  · You feel confused.  · You have signs of dehydration, such as:  ? Dark urine, very little urine, or no urine.  ? Cracked lips.  ? Dry mouth.  ? Sunken eyes.  ? Sleepiness.  ? Weakness.  These symptoms may represent a serious problem that is an emergency. Do not wait to see if the symptoms will go away. Get medical help right away. Call your local emergency services (911 in the [place] Do not drive yourself to the hospital.  Summary  · Vomiting occurs when stomach contents are thrown up and out of the mouth. Vomiting can cause you to become dehydrated. Older adults and people who have other diseases or a weak immune system are at higher risk for dehydration.  · It is important to treat vomiting as told by your health care provider. Follow your health care provider's instructions about eating and drinking.  · Wash your hands often using soap and water. If soap and water are not available, use hand . Make sure that everyone in your household washes their hands frequently.  · Watch your condition for any changes and for signs of dehydration.  · Keep all follow-up visits as told by your health care provider. This is important.  This information is not intended to replace advice given to you by your health care provider. Make sure you discuss any questions you have with your health care  provider.  Document Released: 01/13/2017 Document Revised: 05/28/2019 Document Reviewed: 05/28/2019  Elsevier Patient Education © 2020 Elsevier Inc.

## 2022-03-21 DIAGNOSIS — E03.9 HYPOTHYROIDISM, UNSPECIFIED TYPE: ICD-10-CM

## 2022-03-21 DIAGNOSIS — Z00.00 WELLNESS EXAMINATION: ICD-10-CM

## 2022-04-14 ENCOUNTER — HOSPITAL ENCOUNTER (OUTPATIENT)
Dept: LAB | Facility: MEDICAL CENTER | Age: 27
End: 2022-04-14
Attending: FAMILY MEDICINE
Payer: COMMERCIAL

## 2022-04-14 DIAGNOSIS — E03.9 HYPOTHYROIDISM, UNSPECIFIED TYPE: ICD-10-CM

## 2022-04-14 DIAGNOSIS — Z00.00 WELLNESS EXAMINATION: ICD-10-CM

## 2022-04-14 LAB
ALBUMIN SERPL BCP-MCNC: 4.9 G/DL (ref 3.2–4.9)
ALBUMIN/GLOB SERPL: 1.6 G/DL
ALP SERPL-CCNC: 72 U/L (ref 30–99)
ALT SERPL-CCNC: 29 U/L (ref 2–50)
ANION GAP SERPL CALC-SCNC: 14 MMOL/L (ref 7–16)
AST SERPL-CCNC: 22 U/L (ref 12–45)
BASOPHILS # BLD AUTO: 0.4 % (ref 0–1.8)
BASOPHILS # BLD: 0.04 K/UL (ref 0–0.12)
BILIRUB SERPL-MCNC: 1 MG/DL (ref 0.1–1.5)
BUN SERPL-MCNC: 9 MG/DL (ref 8–22)
CALCIUM SERPL-MCNC: 9.8 MG/DL (ref 8.5–10.5)
CHLORIDE SERPL-SCNC: 101 MMOL/L (ref 96–112)
CHOLEST SERPL-MCNC: 261 MG/DL (ref 100–199)
CO2 SERPL-SCNC: 23 MMOL/L (ref 20–33)
CREAT SERPL-MCNC: 0.61 MG/DL (ref 0.5–1.4)
EOSINOPHIL # BLD AUTO: 0.17 K/UL (ref 0–0.51)
EOSINOPHIL NFR BLD: 1.9 % (ref 0–6.9)
ERYTHROCYTE [DISTWIDTH] IN BLOOD BY AUTOMATED COUNT: 42.6 FL (ref 35.9–50)
FASTING STATUS PATIENT QL REPORTED: NORMAL
GFR SERPLBLD CREATININE-BSD FMLA CKD-EPI: 124 ML/MIN/1.73 M 2
GLOBULIN SER CALC-MCNC: 3.1 G/DL (ref 1.9–3.5)
GLUCOSE SERPL-MCNC: 79 MG/DL (ref 65–99)
HCT VFR BLD AUTO: 39.5 % (ref 37–47)
HDLC SERPL-MCNC: 55 MG/DL
HGB BLD-MCNC: 12.9 G/DL (ref 12–16)
IMM GRANULOCYTES # BLD AUTO: 0.03 K/UL (ref 0–0.11)
IMM GRANULOCYTES NFR BLD AUTO: 0.3 % (ref 0–0.9)
LDLC SERPL CALC-MCNC: 183 MG/DL
LYMPHOCYTES # BLD AUTO: 3.83 K/UL (ref 1–4.8)
LYMPHOCYTES NFR BLD: 42.4 % (ref 22–41)
MCH RBC QN AUTO: 29.1 PG (ref 27–33)
MCHC RBC AUTO-ENTMCNC: 32.7 G/DL (ref 33.6–35)
MCV RBC AUTO: 89.2 FL (ref 81.4–97.8)
MONOCYTES # BLD AUTO: 0.47 K/UL (ref 0–0.85)
MONOCYTES NFR BLD AUTO: 5.2 % (ref 0–13.4)
NEUTROPHILS # BLD AUTO: 4.5 K/UL (ref 2–7.15)
NEUTROPHILS NFR BLD: 49.8 % (ref 44–72)
NRBC # BLD AUTO: 0 K/UL
NRBC BLD-RTO: 0 /100 WBC
PLATELET # BLD AUTO: 275 K/UL (ref 164–446)
PMV BLD AUTO: 10 FL (ref 9–12.9)
POTASSIUM SERPL-SCNC: 3.4 MMOL/L (ref 3.6–5.5)
PROT SERPL-MCNC: 8 G/DL (ref 6–8.2)
RBC # BLD AUTO: 4.43 M/UL (ref 4.2–5.4)
SODIUM SERPL-SCNC: 138 MMOL/L (ref 135–145)
T3 SERPL-MCNC: 113 NG/DL (ref 60–181)
TRIGL SERPL-MCNC: 115 MG/DL (ref 0–149)
TSH SERPL DL<=0.005 MIU/L-ACNC: 13.7 UIU/ML (ref 0.38–5.33)
WBC # BLD AUTO: 9 K/UL (ref 4.8–10.8)

## 2022-04-14 PROCEDURE — 84443 ASSAY THYROID STIM HORMONE: CPT

## 2022-04-14 PROCEDURE — 84480 ASSAY TRIIODOTHYRONINE (T3): CPT

## 2022-04-14 PROCEDURE — 80053 COMPREHEN METABOLIC PANEL: CPT

## 2022-04-14 PROCEDURE — 80061 LIPID PANEL: CPT

## 2022-04-14 PROCEDURE — 85025 COMPLETE CBC W/AUTO DIFF WBC: CPT

## 2022-04-14 PROCEDURE — 36415 COLL VENOUS BLD VENIPUNCTURE: CPT

## 2022-04-22 ENCOUNTER — OFFICE VISIT (OUTPATIENT)
Dept: MEDICAL GROUP | Facility: PHYSICIAN GROUP | Age: 27
End: 2022-04-22
Payer: COMMERCIAL

## 2022-04-22 VITALS
HEART RATE: 86 BPM | TEMPERATURE: 98.8 F | BODY MASS INDEX: 32.5 KG/M2 | WEIGHT: 183.4 LBS | OXYGEN SATURATION: 99 % | HEIGHT: 63 IN | RESPIRATION RATE: 16 BRPM | SYSTOLIC BLOOD PRESSURE: 116 MMHG | DIASTOLIC BLOOD PRESSURE: 76 MMHG

## 2022-04-22 DIAGNOSIS — E78.00 HYPERCHOLESTEROLEMIA: ICD-10-CM

## 2022-04-22 DIAGNOSIS — E03.8 OTHER SPECIFIED HYPOTHYROIDISM: ICD-10-CM

## 2022-04-22 PROCEDURE — 99214 OFFICE O/P EST MOD 30 MIN: CPT | Performed by: FAMILY MEDICINE

## 2022-04-22 RX ORDER — LEVOTHYROXINE SODIUM 0.03 MG/1
TABLET ORAL
COMMUNITY
Start: 2022-04-03 | End: 2022-04-22 | Stop reason: DRUGHIGH

## 2022-04-22 RX ORDER — LEVOTHYROXINE SODIUM 0.05 MG/1
50 TABLET ORAL
Qty: 90 TABLET | Refills: 0 | Status: SHIPPED | OUTPATIENT
Start: 2022-04-22 | End: 2022-07-07

## 2022-04-22 ASSESSMENT — FIBROSIS 4 INDEX: FIB4 SCORE: 0.42

## 2022-04-22 ASSESSMENT — PATIENT HEALTH QUESTIONNAIRE - PHQ9: CLINICAL INTERPRETATION OF PHQ2 SCORE: 0

## 2022-04-22 NOTE — LETTER
[email or web address] [employer]  Lu Lin M.D.  [address]  [facility] [address]  Fax: [phone]   Authorization for Release/Disclosure of   Protected Health Information   Name: MARIA DOLORES SAWYER : 1995 SSN: [id number]   Address: [address] [place] [id number] Phone:    [phone] (home)    I authorize the entity listed below to release/disclose the PHI below to:   [facility], later [facility]/Lu Lin M.D. and Lu Lin M.D.   Provider or Entity Name: [facility], Dr. Humberto Herrera MD, OB-GYN     Address   [facility], [address]                            [place] [address]   Phone: [phone]      Fax:     Reason for request: continuity of care   Information to be released:    [  ] LAST COLONOSCOPY,  including any PATH REPORT and follow-up  [  ] LAST FIT/COLOGUARD RESULT [  ] LAST DEXA  [  ] LAST MAMMOGRAM  [ XXX ] LAST PAP  [  ] LAST LABS [  ] RETINA EXAM REPORT  [  ] IMMUNIZATION RECORDS  [  ] Release all info      [  ] Check here and initial the line next to each item to release ALL health information INCLUDING  _____ Care and treatment for drug and / or alcohol abuse  _____ HIV testing, infection status, or AIDS  _____ Genetic Testing    DATES OF SERVICE OR TIME PERIOD TO BE DISCLOSED: _____________  I understand and acknowledge that:  * This Authorization may be revoked at any time by you in writing, except if your health information has already been used or disclosed.  * Your health information that will be used or disclosed as a result of you signing this authorization could be re-disclosed by the recipient. If this occurs, your re-disclosed health information may no longer be protected by State or Federal laws.  * You may refuse to sign this Authorization. Your refusal will not affect your ability to obtain treatment.  * This Authorization becomes effective upon signing and will  on (date) __________.      If no date is indicated, this Authorization will  one  (1) year from the signature date.    Name: Juana Arelis    Signature: Continuity of care   Date:     4/22/2022       PLEASE FAX REQUESTED RECORDS BACK TO: [phone]

## 2022-04-22 NOTE — PROGRESS NOTES
"Subjective:     CC:   Chief Complaint   Patient presents with   • Follow-Up       HPI:   Juana presents today to go over her labs.  Patient states that she has gained more weight and also she feels like she is having issues with her hair falling out more.  Patient did see GI last year and was treated for H. pylori and she is doing better.    Past Medical History:   Diagnosis Date   • Anxiety    • Depression    • Migraine        Social History     Tobacco Use   • Smoking status: Former Smoker     Quit date: 2013     Years since quittin.9   • Smokeless tobacco: Never Used   Vaping Use   • Vaping Use: Never used   Substance Use Topics   • Alcohol use: Yes     Comment: socially   • Drug use: No     Types: Methamphetamines     Comment: Last used 2013       Current Outpatient Medications Ordered in Epic   Medication Sig Dispense Refill   • levothyroxine (SYNTHROID) 50 MCG Tab Take 1 Tablet by mouth every morning on an empty stomach. 90 Tablet 0     No current Epic-ordered facility-administered medications on file.       Allergies:  Patient has no known allergies.    Health Maintenance: Completed    ROS:  Gen: no fevers/chills  Eyes: no changes in vision  ENT: no sore throat, no hearing loss, no bloody nose  Pulm: no sob, no cough  CV: no chest pain, no palpitations  GI: no nausea/vomiting, no diarrhea  Neuro: no headaches, no numbness/tingling  Heme/Lymph: no easy bruising    Objective:     Exam:  /76   Pulse 86   Temp 37.1 °C (98.8 °F)   Resp 16   Ht 1.6 m (5' 3\")   Wt 83.2 kg (183 lb 6.4 oz)   LMP 2022   SpO2 99%   BMI 32.49 kg/m²  Body mass index is 32.49 kg/m².    Gen: Alert and oriented, No apparent distress.  Skin: Warm and dry.  No obvious lesions.  Eyes: Sclera wnl Pupils normal in size  Lungs: Normal effort, CTA bilaterally, no wheezes, rhonchi, or rales  CV: Regular rate and rhythm. No murmurs, rubs, or gallops.  ABD: Soft non-tender no organomegaly  Musculoskeletal: " Normal gait. No extremity cyanosis, clubbing, or edema.  Neuro: Oriented to person, place and time  Psych: Mood is wnl       Labs: Recommend we we repeat her TSH in 8 weeks.      Assessment & Plan:     26 y.o. female with the following -     1. Other specified hypothyroidism  We will increase her dose of her thyroid I will need to repeat it again in 8 weeks.  I recommend I see her back then this is a chronic problem  2. Hypercholesterolemia  Patient's thyroid is increased we will need repeat this again in August timeframe patient agreeable with this plan.  Patient's good cholesterol has increased which is great we will see about the correction on her thyroid to see if this may help along with repeat this again in August this is a chronic problem  Other orders  - levothyroxine (SYNTHROID) 50 MCG Tab; Take 1 Tablet by mouth every morning on an empty stomach.  Dispense: 90 Tablet; Refill: 0       Return in about 2 months (around 6/22/2022).    Please note that this dictation was created using voice recognition software. I have made every reasonable attempt to correct obvious errors, but I expect that there are errors of grammar and possibly content that I did not discover before finalizing the note.

## 2022-05-03 ENCOUNTER — OFFICE VISIT (OUTPATIENT)
Dept: MEDICAL GROUP | Facility: PHYSICIAN GROUP | Age: 27
End: 2022-05-03
Payer: COMMERCIAL

## 2022-05-03 VITALS
OXYGEN SATURATION: 100 % | WEIGHT: 182.6 LBS | HEIGHT: 63 IN | DIASTOLIC BLOOD PRESSURE: 74 MMHG | BODY MASS INDEX: 32.36 KG/M2 | TEMPERATURE: 98 F | SYSTOLIC BLOOD PRESSURE: 110 MMHG | RESPIRATION RATE: 16 BRPM | HEART RATE: 77 BPM

## 2022-05-03 DIAGNOSIS — E03.8 OTHER SPECIFIED HYPOTHYROIDISM: ICD-10-CM

## 2022-05-03 DIAGNOSIS — F32.A DEPRESSION, UNSPECIFIED DEPRESSION TYPE: ICD-10-CM

## 2022-05-03 PROCEDURE — 99214 OFFICE O/P EST MOD 30 MIN: CPT | Performed by: FAMILY MEDICINE

## 2022-05-03 ASSESSMENT — FIBROSIS 4 INDEX: FIB4 SCORE: 0.42

## 2022-05-03 NOTE — LETTER
May 3, 2022         Patient: Juana Infante   YOB: 1995   Date of Visit: 5/3/2022           To Whom it May Concern:    Juana Infante was seen in my clinic on 5/3/2022.  Please excuse patient  from work the rest of this week I will be seeing her back for follow-up next week this is due to a medical issue.    If you have any questions or concerns, please don't hesitate to call.        Sincerely,           Lu Lin M.D.  Electronically Signed

## 2022-05-03 NOTE — PROGRESS NOTES
"Subjective:     CC:   Chief Complaint   Patient presents with   • Follow-Up       HPI:   Juana presents today with a complaint of depression.  Patient states she has had a long history of depression has never been on any medication for this.  Patient denies any suicidal thoughts.  Patient does work for [employer] and is impacting her performance at work.  Patient has not sought counseling services that has all offers.  Patient was requesting a leave of absence from [employer] hoping that when she gets better she can go back to work.    Past Medical History:   Diagnosis Date   • Anxiety    • Depression    • Migraine        Social History     Tobacco Use   • Smoking status: Former Smoker     Quit date: 2013     Years since quittin.9   • Smokeless tobacco: Never Used   Vaping Use   • Vaping Use: Never used   Substance Use Topics   • Alcohol use: Yes     Comment: socially   • Drug use: No     Types: Methamphetamines     Comment: Last used 2013       Current Outpatient Medications Ordered in Epic   Medication Sig Dispense Refill   • sertraline (ZOLOFT) 50 MG Tab I half a tablet p.o. daily for the first week then increase to 1 full tablet each day 30 Tablet 0   • levothyroxine (SYNTHROID) 50 MCG Tab Take 1 Tablet by mouth every morning on an empty stomach. 90 Tablet 0     No current Epic-ordered facility-administered medications on file.       Allergies:  Patient has no known allergies.    Health Maintenance: Completed    ROS:  Gen: no fevers/chills, no changes in weight  Eyes: no changes in vision  Neuro: no headaches, no numbness/tingling  Heme/Lymph: no easy bruising    Objective:     Exam:  /74   Pulse 77   Temp 36.7 °C (98 °F)   Resp 16   Ht 1.6 m (5' 3\")   Wt 82.8 kg (182 lb 9.6 oz)   LMP 2022   SpO2 100%   BMI 32.35 kg/m²  Body mass index is 32.35 kg/m².    Gen: Alert and oriented, No apparent distress.  Skin: Warm and dry.  No obvious lesions.  Eyes: Sclera wnl Pupils normal in " size  Musculoskeletal: Normal gait. No extremity cyanosis, clubbing, or edema.  Neuro: Oriented to person, place and time  Psych: Mood is wnl       Assessment & Plan:     26 y.o. female with the following -     1. Depression, unspecified depression type  We will start patient on medication did inform patient it takes 2 weeks for it to kick in.  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  I did warn patient if she ever feels suicidal to always go to emergency room but I should see her back next week.  This is a chronic problem    2. Other specified hypothyroidism  Patient is taking her thyroid medication regularly it is not time for us to check her TSH at this time  Other orders  - sertraline (ZOLOFT) 50 MG Tab; I half a tablet p.o. daily for the first week then increase to 1 full tablet each day  Dispense: 30 Tablet; Refill: 0       Return in about 1 week (around 5/10/2022).    Please note that this dictation was created using voice recognition software. I have made every reasonable attempt to correct obvious errors, but I expect that there are errors of grammar and possibly content that I did not discover before finalizing the note.

## 2022-05-10 ENCOUNTER — APPOINTMENT (OUTPATIENT)
Dept: MEDICAL GROUP | Facility: PHYSICIAN GROUP | Age: 27
End: 2022-05-10
Payer: COMMERCIAL

## 2022-06-20 ENCOUNTER — HOSPITAL ENCOUNTER (OUTPATIENT)
Dept: LAB | Facility: MEDICAL CENTER | Age: 27
End: 2022-06-20
Attending: FAMILY MEDICINE
Payer: COMMERCIAL

## 2022-06-20 DIAGNOSIS — E03.8 OTHER SPECIFIED HYPOTHYROIDISM: ICD-10-CM

## 2022-06-20 LAB
T3 SERPL-MCNC: 114 NG/DL (ref 60–181)
TSH SERPL DL<=0.005 MIU/L-ACNC: 9.16 UIU/ML (ref 0.38–5.33)

## 2022-06-20 PROCEDURE — 84480 ASSAY TRIIODOTHYRONINE (T3): CPT

## 2022-06-20 PROCEDURE — 84443 ASSAY THYROID STIM HORMONE: CPT

## 2022-06-20 PROCEDURE — 36415 COLL VENOUS BLD VENIPUNCTURE: CPT

## 2022-06-24 ENCOUNTER — APPOINTMENT (OUTPATIENT)
Dept: MEDICAL GROUP | Facility: PHYSICIAN GROUP | Age: 27
End: 2022-06-24
Payer: COMMERCIAL

## 2022-07-07 RX ORDER — LEVOTHYROXINE SODIUM 0.03 MG/1
25 TABLET ORAL
Qty: 90 TABLET | Refills: 0 | Status: SHIPPED | OUTPATIENT
Start: 2022-07-07 | End: 2022-08-10 | Stop reason: DRUGHIGH

## 2022-07-21 ENCOUNTER — OFFICE VISIT (OUTPATIENT)
Dept: MEDICAL GROUP | Facility: PHYSICIAN GROUP | Age: 27
End: 2022-07-21
Payer: COMMERCIAL

## 2022-07-21 VITALS
HEART RATE: 65 BPM | TEMPERATURE: 97.9 F | SYSTOLIC BLOOD PRESSURE: 112 MMHG | WEIGHT: 182.2 LBS | OXYGEN SATURATION: 99 % | HEIGHT: 63 IN | BODY MASS INDEX: 32.28 KG/M2 | DIASTOLIC BLOOD PRESSURE: 70 MMHG | RESPIRATION RATE: 16 BRPM

## 2022-07-21 DIAGNOSIS — E78.00 HYPERCHOLESTEROLEMIA: ICD-10-CM

## 2022-07-21 DIAGNOSIS — E03.8 OTHER SPECIFIED HYPOTHYROIDISM: ICD-10-CM

## 2022-07-21 DIAGNOSIS — R13.10 SWALLOWING DISORDER: ICD-10-CM

## 2022-07-21 DIAGNOSIS — F32.A DEPRESSION, UNSPECIFIED DEPRESSION TYPE: ICD-10-CM

## 2022-07-21 PROCEDURE — 99214 OFFICE O/P EST MOD 30 MIN: CPT | Performed by: FAMILY MEDICINE

## 2022-07-21 ASSESSMENT — FIBROSIS 4 INDEX: FIB4 SCORE: 0.42

## 2022-07-21 NOTE — PROGRESS NOTES
Subjective:     CC:   Chief Complaint   Patient presents with   • Follow-Up       HPI:   Juana presents today for follow-up.  Patient decided not to start the sertraline because she is concerned about being on any medication for depression.  Patient denies any suicidal thoughts.  Patient is taking her thyroid medicine.  Patient did speak to her supervisor at [employer] about her issues with stress her supervisor gave her some links but she never did it.  At the end of the visit I did find out that patient's  is a supervisor at [employer] I would recommend she talk to him about what services are offered since she has not really mention to him that she is feeling depressed.  Patient feels that sometimes she feels there is something in her throat and sometimes is issues with swallowing.  This is not all the time patient's will worry that this it could be her thyroid.    Past Medical History:   Diagnosis Date   • Anxiety    • Depression    • Migraine        Social History     Tobacco Use   • Smoking status: Former Smoker     Quit date: 2013     Years since quittin.1   • Smokeless tobacco: Never Used   Vaping Use   • Vaping Use: Never used   Substance Use Topics   • Alcohol use: Yes     Comment: socially   • Drug use: No     Types: Methamphetamines     Comment: Last used 2013       Current Outpatient Medications Ordered in Epic   Medication Sig Dispense Refill   • levothyroxine (SYNTHROID) 25 MCG Tab TAKE 1 TABLET BY MOUTH EVERY MORNING ON AN EMPTY STOMACH FOR 90 DAYS. 90 Tablet 0   • sertraline (ZOLOFT) 50 MG Tab I half a tablet p.o. daily for the first week then increase to 1 full tablet each day 30 Tablet 0     No current Epic-ordered facility-administered medications on file.       Allergies:  Patient has no known allergies.    Health Maintenance: Completed    ROS:  Gen: no fevers/chills, no changes in weight  Eyes: no changes in vision  ENT: no sore throat, no hearing loss, no bloody nose  Pulm:  "no sob, no cough  CV: no chest pain, no palpitations  GI: no nausea/vomiting, no diarrhea  Neuro: no headaches, no numbness/tingling  Heme/Lymph: no easy bruising    Objective:     Exam:  /70 (BP Location: Left arm, Patient Position: Sitting, BP Cuff Size: Adult)   Pulse 65   Temp 36.6 °C (97.9 °F) (Temporal)   Resp 16   Ht 1.6 m (5' 3\")   Wt 82.6 kg (182 lb 3.2 oz)   LMP 07/13/2022   SpO2 99%   BMI 32.28 kg/m²  Body mass index is 32.28 kg/m².    Gen: Alert and oriented, No apparent distress.  Skin: Warm and dry.  No obvious lesions.  Eyes: Sclera wnl Pupils normal in size  Neck: Neck is supple without lymphadenopathy.  Thyroid does not appear to be in large  Lungs: Normal effort, CTA bilaterally, no wheezes, rhonchi, or rales  CV: Regular rate and rhythm. No murmurs, rubs, or gallops.  Musculoskeletal: Normal gait. No extremity cyanosis, clubbing, or edema.  Neuro: Oriented to person, place and time  Psych: Mood is wnl       Assessment & Plan:     26 y.o. female with the following -     1. Swallowing disorder  Patient at this time just wants the thyroid ultrasound done I did mention the possibility of referral to ear nose and throat or GI for scope the patient wants to wait on this.  We will see her back in mid August or sooner if problems persist or worsen this is acute problem  - US-SOFT TISSUES OF HEAD - NECK; Future    2. Hypercholesterolemia  I did inform patient she will need to repeat her lipid panel again in mid August this is a chronic problem  - Comp Metabolic Panel; Future  - Lipid Profile; Future    3. Other specified hypothyroidism  Patient will repeat her TSH in mid August this is a chronic problem  - TSH; Future  - TRIIDOTHYRONINE; Future    4. Depression, unspecified depression type  I went ahead in wrote referral for mental health counseling recommend I see her back in mid August or sooner if symptoms worsen this is acute problem  - Referral to Psychiatry       Return in about 4 weeks " (around 8/18/2022).    Please note that this dictation was created using voice recognition software. I have made every reasonable attempt to correct obvious errors, but I expect that there are errors of grammar and possibly content that I did not discover before finalizing the note.

## 2022-08-10 RX ORDER — LEVOTHYROXINE SODIUM 0.05 MG/1
50 TABLET ORAL
Qty: 90 TABLET | Refills: 0 | Status: SHIPPED | OUTPATIENT
Start: 2022-08-10 | End: 2022-12-07

## 2022-08-11 ENCOUNTER — APPOINTMENT (OUTPATIENT)
Dept: RADIOLOGY | Facility: MEDICAL CENTER | Age: 27
End: 2022-08-11
Attending: FAMILY MEDICINE
Payer: COMMERCIAL

## 2022-08-25 ENCOUNTER — HOSPITAL ENCOUNTER (OUTPATIENT)
Dept: LAB | Facility: MEDICAL CENTER | Age: 27
End: 2022-08-25
Attending: FAMILY MEDICINE
Payer: COMMERCIAL

## 2022-08-25 DIAGNOSIS — E03.8 OTHER SPECIFIED HYPOTHYROIDISM: ICD-10-CM

## 2022-08-25 DIAGNOSIS — E78.00 HYPERCHOLESTEROLEMIA: ICD-10-CM

## 2022-08-25 LAB
ALBUMIN SERPL BCP-MCNC: 4.8 G/DL (ref 3.2–4.9)
ALBUMIN/GLOB SERPL: 1.7 G/DL
ALP SERPL-CCNC: 61 U/L (ref 30–99)
ALT SERPL-CCNC: 13 U/L (ref 2–50)
ANION GAP SERPL CALC-SCNC: 10 MMOL/L (ref 7–16)
AST SERPL-CCNC: 15 U/L (ref 12–45)
BILIRUB SERPL-MCNC: 0.5 MG/DL (ref 0.1–1.5)
BUN SERPL-MCNC: 12 MG/DL (ref 8–22)
CALCIUM SERPL-MCNC: 9.3 MG/DL (ref 8.5–10.5)
CHLORIDE SERPL-SCNC: 104 MMOL/L (ref 96–112)
CHOLEST SERPL-MCNC: 202 MG/DL (ref 100–199)
CO2 SERPL-SCNC: 22 MMOL/L (ref 20–33)
CREAT SERPL-MCNC: 0.67 MG/DL (ref 0.5–1.4)
FASTING STATUS PATIENT QL REPORTED: NORMAL
GFR SERPLBLD CREATININE-BSD FMLA CKD-EPI: 121 ML/MIN/1.73 M 2
GLOBULIN SER CALC-MCNC: 2.8 G/DL (ref 1.9–3.5)
GLUCOSE SERPL-MCNC: 81 MG/DL (ref 65–99)
HDLC SERPL-MCNC: 43 MG/DL
LDLC SERPL CALC-MCNC: 129 MG/DL
POTASSIUM SERPL-SCNC: 3.9 MMOL/L (ref 3.6–5.5)
PROT SERPL-MCNC: 7.6 G/DL (ref 6–8.2)
SODIUM SERPL-SCNC: 136 MMOL/L (ref 135–145)
T3 SERPL-MCNC: 74 NG/DL (ref 60–181)
TRIGL SERPL-MCNC: 148 MG/DL (ref 0–149)
TSH SERPL DL<=0.005 MIU/L-ACNC: 2.23 UIU/ML (ref 0.38–5.33)

## 2022-08-25 PROCEDURE — 84480 ASSAY TRIIODOTHYRONINE (T3): CPT

## 2022-08-25 PROCEDURE — 80061 LIPID PANEL: CPT

## 2022-08-25 PROCEDURE — 80053 COMPREHEN METABOLIC PANEL: CPT

## 2022-08-25 PROCEDURE — 84443 ASSAY THYROID STIM HORMONE: CPT

## 2022-08-25 PROCEDURE — 36415 COLL VENOUS BLD VENIPUNCTURE: CPT

## 2022-09-02 NOTE — OR NURSING
Arrived to Phase II after report from Kisha AGUILAR, reports some nausea, reports pain 0/10. Ambulated from gurney to chair with standby assist.    Surgical site- 4 lap sites clean and dry. No drainage.     Pt. Very sleepy. Pt. Sleeping in chair with 2L O2 to help maintain saturations above 90%.  at bedside.     Alarms on and set to appropriate parameters. Call light within reach, rounding in place.      
Pt on room air.  Nausea treated with Zofran, Haldol and QuaseEASE, now tolerating PO fluids and medication.  X 4 laparoscopic abdominal incisions CDI, Dermabond.  Ice pack and pillow for splinting in place.  Pain down from 8/10 to 5/10, tolerable per pt. VSS, afebrile, BASILIO, A/O x4.    Prescription for Roxicodone on pt chart.  Transferred with surgical mask in place.   Glasses in place.   
Pt's  Mat phoned and updated on pt status in Recovery.  Mat is waiting in the surgical waiting area.     
Pt's VSS; denies N/V; states pain is at tolerable level. Dressing CDI and unchanged from initial assessment. D/c orders received. IV removed. Pt changed into clothing with assistance. Discharge instructions given; pt and family verbalized understanding and questions answered. Patient states ready to d/c home.Rx for oxycodone given to pt. Pt dc'd in w/c with RN in stable condition.  
Name band;

## 2022-09-25 ENCOUNTER — OFFICE VISIT (OUTPATIENT)
Dept: URGENT CARE | Facility: PHYSICIAN GROUP | Age: 27
End: 2022-09-25
Payer: COMMERCIAL

## 2022-09-25 VITALS
WEIGHT: 181 LBS | OXYGEN SATURATION: 97 % | HEIGHT: 63 IN | BODY MASS INDEX: 32.07 KG/M2 | DIASTOLIC BLOOD PRESSURE: 74 MMHG | TEMPERATURE: 97.9 F | RESPIRATION RATE: 18 BRPM | SYSTOLIC BLOOD PRESSURE: 116 MMHG | HEART RATE: 85 BPM

## 2022-09-25 DIAGNOSIS — J06.9 VIRAL URI WITH COUGH: ICD-10-CM

## 2022-09-25 DIAGNOSIS — U07.1 COVID-19: ICD-10-CM

## 2022-09-25 LAB
EXTERNAL QUALITY CONTROL: ABNORMAL
FLUAV+FLUBV AG SPEC QL IA: NEGATIVE
INT CON NEG: ABNORMAL
INT CON NEG: NORMAL
INT CON POS: ABNORMAL
INT CON POS: NORMAL
SARS-COV+SARS-COV-2 AG RESP QL IA.RAPID: POSITIVE

## 2022-09-25 PROCEDURE — 87804 INFLUENZA ASSAY W/OPTIC: CPT | Performed by: NURSE PRACTITIONER

## 2022-09-25 PROCEDURE — 87426 SARSCOV CORONAVIRUS AG IA: CPT | Performed by: NURSE PRACTITIONER

## 2022-09-25 PROCEDURE — 99213 OFFICE O/P EST LOW 20 MIN: CPT | Mod: 25,CS | Performed by: NURSE PRACTITIONER

## 2022-09-25 RX ORDER — BENZONATATE 200 MG/1
200 CAPSULE ORAL EVERY 8 HOURS PRN
Qty: 30 CAPSULE | Refills: 0 | Status: SHIPPED | OUTPATIENT
Start: 2022-09-25 | End: 2023-05-19

## 2022-09-25 ASSESSMENT — ENCOUNTER SYMPTOMS
WEAKNESS: 0
SENSORY CHANGE: 0
CHILLS: 1
COUGH: 1
HEADACHES: 1
MYALGIAS: 1

## 2022-09-25 ASSESSMENT — VISUAL ACUITY: OU: 1

## 2022-09-25 ASSESSMENT — FIBROSIS 4 INDEX: FIB4 SCORE: 0.44

## 2022-09-25 NOTE — LETTER
September 25, 2022         Patient: Juana Infante   YOB: 1995   Date of Visit: 9/25/2022         To Whom it May Concern:    Juana Infante was seen in my clinic on 9/25/2022.    Patient had a SARS-CoV-2 rapid antigen test performed on 9/25/2022 to evaluate for COVID-19 and results are POSITIVE.     Patient is advised to self-isolate as recommended by the CDC.    • Children and adults with mild, symptomatic COVID-19: Isolation can end at least 5 days after symptom onset and after fever ends for 24 hours (without the use of fever-reducing medication) and symptoms are improving, if these people can continue to properly wear a well-fitted mask around others for 5 more days after the 5-day isolation period. Day 0 is the first day of symptoms.  • People who are infected but asymptomatic (never develop symptoms): Isolation can end at least 5 days after the first positive test (with day 0 being the date their specimen was collected for the positive test), if these people can continue to wear a properly well-fitted mask around others for 5 more days after the 5-day isolation period. However, if symptoms develop after a positive test, their 5-day isolation period should start over (day 0 changes to the first day of symptoms).  • People who have moderate COVID-19 illness: Isolate for 10 days.  • People who are severely ill (i.e., requiring hospitalization, intensive care, or ventilation support): Extending the duration of isolation and precautions to at least 10 days and up to 20 days after symptom onset, and after fever ends (without the use of fever-reducing medication) and symptoms are improving, may be warranted.  • People who are moderately or severely immunocompromised might have a longer infectious period: Extend isolation to 20 or more days (day 0 is the first day of symptoms or a positive viral test). Use a test-based strategy and consult with an infectious disease specialist to determine the  appropriate duration of isolation and precautions.  • Recovered patients: Patients who have recovered from COVID-19 can continue to have detectable SARS-CoV-2 RNA in upper respiratory specimens for up to 3 months after illness onset. However, replication-competent virus has not been reliably recovered from such patients, and they are not likely infectious.  • Available data suggest that patients with mild-to-moderate COVID-19 remain infectious no longer than 10 days after symptom onset.     Please visit https://www.cdc.gov/coronavirus/2019-ncov/hcp/duration-isolation.html for further information.     If you have any questions or concerns, please don't hesitate to call.        Sincerely,         Evan Ugarte A.P.R.N.  Electronically Signed

## 2022-09-25 NOTE — PATIENT INSTRUCTIONS
Patient had a SARS-CoV-2 rapid antigen test performed on 9/25/2022 to evaluate for COVID-19 and results are POSITIVE.     Patient is advised to self-isolate as recommended by the CDC.    Children and adults with mild, symptomatic COVID-19: Isolation can end at least 5 days after symptom onset and after fever ends for 24 hours (without the use of fever-reducing medication) and symptoms are improving, if these people can continue to properly wear a well-fitted mask around others for 5 more days after the 5-day isolation period. Day 0 is the first day of symptoms.  People who are infected but asymptomatic (never develop symptoms): Isolation can end at least 5 days after the first positive test (with day 0 being the date their specimen was collected for the positive test), if these people can continue to wear a properly well-fitted mask around others for 5 more days after the 5-day isolation period. However, if symptoms develop after a positive test, their 5-day isolation period should start over (day 0 changes to the first day of symptoms).  People who have moderate COVID-19 illness: Isolate for 10 days.  People who are severely ill (i.e., requiring hospitalization, intensive care, or ventilation support): Extending the duration of isolation and precautions to at least 10 days and up to 20 days after symptom onset, and after fever ends (without the use of fever-reducing medication) and symptoms are improving, may be warranted.  People who are moderately or severely immunocompromised might have a longer infectious period: Extend isolation to 20 or more days (day 0 is the first day of symptoms or a positive viral test). Use a test-based strategy and consult with an infectious disease specialist to determine the appropriate duration of isolation and precautions.  Recovered patients: Patients who have recovered from COVID-19 can continue to have detectable SARS-CoV-2 RNA in upper respiratory specimens for up to 3 months  after illness onset. However, replication-competent virus has not been reliably recovered from such patients, and they are not likely infectious.  Available data suggest that patients with mild-to-moderate COVID-19 remain infectious no longer than 10 days after symptom onset.     Please visit https://www.cdc.gov/coronavirus/2019-ncov/hcp/duration-isolation.html for further information.

## 2022-09-25 NOTE — PROGRESS NOTES
"Subjective:     Juana Infante is a 27 y.o. female who presents for Cough (Headache, body aches, chills x 3 days)       Cough  This is a new problem. Episode onset: 2 days ago. The problem has been gradually worsening. Associated symptoms include chills, headaches and myalgias.     Review of Systems   Constitutional:  Positive for chills.   HENT:  Positive for congestion.    Respiratory:  Positive for cough.    Musculoskeletal:  Positive for myalgias.   Neurological:  Positive for headaches. Negative for sensory change and weakness.   All other systems reviewed and are negative.    Refer to HPI for additional details.    During this visit, appropriate PPE was worn, hand hygiene was performed, and the patient and any visitors were masked.    PMH:  has a past medical history of Anxiety (2013), Depression (2013), and Migraine.    MEDS:   Current Outpatient Medications:     benzonatate (TESSALON) 200 MG capsule, Take 1 Capsule by mouth every 8 hours as needed for Cough., Disp: 30 Capsule, Rfl: 0    levothyroxine (SYNTHROID) 50 MCG Tab, Take 1 Tablet by mouth every morning on an empty stomach., Disp: 90 Tablet, Rfl: 0    sertraline (ZOLOFT) 50 MG Tab, I half a tablet p.o. daily for the first week then increase to 1 full tablet each day, Disp: 30 Tablet, Rfl: 0    ALLERGIES: No Known Allergies  SURGHX:   Past Surgical History:   Procedure Laterality Date    GIANFRANCO BY LAPAROSCOPY N/A 10/13/2020    Procedure: CHOLECYSTECTOMY, LAPAROSCOPIC;  Surgeon: Spencer Nur D.O.;  Location: SURGERY [facility];  Service: General    TONSILLECTOMY       SOCHX:  reports that she quit smoking about 9 years ago. She has never used smokeless tobacco. She reports current alcohol use. She reports that she does not use drugs.    FH: Per HPI as applicable/pertinent.      Objective:     /74   Pulse 85   Temp 36.6 °C (97.9 °F)   Resp 18   Ht 1.6 m (5' 3\")   Wt 82.1 kg (181 lb)   SpO2 97%   BMI 32.06 kg/m²     Physical Exam  Nursing " note reviewed.   Constitutional:       General: She is not in acute distress.     Appearance: She is well-developed. She is not ill-appearing or toxic-appearing.   HENT:      Nose: Nose normal.      Mouth/Throat:      Mouth: Mucous membranes are moist.   Eyes:      General: Vision grossly intact.      Extraocular Movements: Extraocular movements intact.   Cardiovascular:      Rate and Rhythm: Normal rate and regular rhythm.      Heart sounds: Normal heart sounds.   Pulmonary:      Effort: Pulmonary effort is normal. No respiratory distress.      Breath sounds: Normal breath sounds. No decreased breath sounds.   Musculoskeletal:         General: No deformity. Normal range of motion.      Cervical back: Normal range of motion.   Skin:     General: Skin is warm and dry.      Coloration: Skin is not pale.   Neurological:      Mental Status: She is alert and oriented to person, place, and time.      Motor: No weakness.   Psychiatric:         Behavior: Behavior normal. Behavior is cooperative.     POCT Covid: positive    Influenza A/B swab: negative      Assessment/Plan:     1. COVID-19    2. Viral URI with cough  - POCT SARS-COV Antigen DOUG Manual Result  - POCT Influenza A/B  - benzonatate (TESSALON) 200 MG capsule; Take 1 Capsule by mouth every 8 hours as needed for Cough.  Dispense: 30 Capsule; Refill: 0    Discussed likely self-limiting viral etiology and expected course and duration of illness. Vital signs stable, afebrile, no acute distress at this time. Discussed Paxlovid. Hx of hypothyroid. Patient declines at this time.    Patient is advised to self-isolate as recommended by the CDC.    Children and adults with mild, symptomatic COVID-19: Isolation can end at least 5 days after symptom onset and after fever ends for 24 hours (without the use of fever-reducing medication) and symptoms are improving, if these people can continue to properly wear a well-fitted mask around others for 5 more days after the 5-day  isolation period. Day 0 is the first day of symptoms.  People who are infected but asymptomatic (never develop symptoms): Isolation can end at least 5 days after the first positive test (with day 0 being the date their specimen was collected for the positive test), if these people can continue to wear a properly well-fitted mask around others for 5 more days after the 5-day isolation period. However, if symptoms develop after a positive test, their 5-day isolation period should start over (day 0 changes to the first day of symptoms).  People who have moderate COVID-19 illness: Isolate for 10 days.  People who are severely ill (i.e., requiring hospitalization, intensive care, or ventilation support): Extending the duration of isolation and precautions to at least 10 days and up to 20 days after symptom onset, and after fever ends (without the use of fever-reducing medication) and symptoms are improving, may be warranted.  People who are moderately or severely immunocompromised might have a longer infectious period: Extend isolation to 20 or more days (day 0 is the first day of symptoms or a positive viral test). Use a test-based strategy and consult with an infectious disease specialist to determine the appropriate duration of isolation and precautions.  Recovered patients: Patients who have recovered from COVID-19 can continue to have detectable SARS-CoV-2 RNA in upper respiratory specimens for up to 3 months after illness onset. However, replication-competent virus has not been reliably recovered from such patients, and they are not likely infectious.  Available data suggest that patients with mild-to-moderate COVID-19 remain infectious no longer than 10 days after symptom onset.     Please visit https://www.cdc.gov/coronavirus/2019-ncov/hcp/duration-isolation.html for further information.     Differential diagnosis, natural history, supportive care, rest, fluids, over-the-counter symptom management per  's instructions, ibuprofen, APAP, close monitoring, and indications for immediate follow-up discussed. Rx for Tessalon.    All questions answered. Patient agrees with the plan of care.    Discharge summary provided.    Work note provided.

## 2022-11-15 ENCOUNTER — OFFICE VISIT (OUTPATIENT)
Dept: URGENT CARE | Facility: PHYSICIAN GROUP | Age: 27
End: 2022-11-15
Payer: COMMERCIAL

## 2022-11-15 VITALS
HEIGHT: 63 IN | RESPIRATION RATE: 16 BRPM | DIASTOLIC BLOOD PRESSURE: 70 MMHG | HEART RATE: 73 BPM | BODY MASS INDEX: 31.89 KG/M2 | OXYGEN SATURATION: 99 % | SYSTOLIC BLOOD PRESSURE: 122 MMHG | WEIGHT: 180 LBS | TEMPERATURE: 96.8 F

## 2022-11-15 DIAGNOSIS — R10.32 LEFT LOWER QUADRANT ABDOMINAL PAIN: ICD-10-CM

## 2022-11-15 PROCEDURE — 99214 OFFICE O/P EST MOD 30 MIN: CPT | Performed by: NURSE PRACTITIONER

## 2022-11-15 ASSESSMENT — FIBROSIS 4 INDEX: FIB4 SCORE: 0.44

## 2022-11-16 NOTE — PROGRESS NOTES
Patient has consented to treatment and for use of patient information for treatment and billing purposes.    Date: 11/15/22     Arrival Mode: Private Vehicle    Chief Complaint:    Chief Complaint   Patient presents with    Abdominal Pain     Lower abd pain started this morning     Nausea        History of Present Illness: 27 y.o.  female presents to clinic with left lower abdominal pain that started this morning.  Patient states it started with sudden onset 10 of 10 pain left lower quadrant that did radiate around to her back.  Patient states the pain has improved slightly and is a 5 out of 10 currently.  Last bowel movement was yesterday and was normal for her.  Patient is currently menstruating and states she does not normally have painful periods.  Patient did take a pregnancy test as that she is attempting to become pregnant of which the results are positive.  Denies any history of kidney stones or ovarian cysts.  Patient has never had these type of pains prior.  Denies that the pains are associated with bowel movements.  Patient does admit to nausea no vomiting no fever or body aches.  No dysuria urinary frequency vaginal discharge or discomfort.  Denies increase in bleeding or passing of clots.        ROS:    As stated in HPI     Pertinent Medical History:  Past Medical History:   Diagnosis Date    Anxiety 2013    Depression 2013    Migraine         Pertinent Surgical History:  Past Surgical History:   Procedure Laterality Date    GIANFRANCO BY LAPAROSCOPY N/A 10/13/2020    Procedure: CHOLECYSTECTOMY, LAPAROSCOPIC;  Surgeon: Spencer Nur D.O.;  Location: SURGERY [facility];  Service: General    TONSILLECTOMY          Pertinent Medications:    Current Outpatient Medications on File Prior to Visit   Medication Sig Dispense Refill    levothyroxine (SYNTHROID) 50 MCG Tab Take 1 Tablet by mouth every morning on an empty stomach. 90 Tablet 0    sertraline (ZOLOFT) 50 MG Tab I half a tablet p.o. daily for the first  week then increase to 1 full tablet each day 30 Tablet 0    benzonatate (TESSALON) 200 MG capsule Take 1 Capsule by mouth every 8 hours as needed for Cough. (Patient not taking: Reported on 11/15/2022) 30 Capsule 0     No current facility-administered medications on file prior to visit.        Allergies:    Patient has no known allergies.     Social History:  Social History     Tobacco Use    Smoking status: Former     Types: Cigarettes     Quit date: 2013     Years since quittin.4    Smokeless tobacco: Never   Vaping Use    Vaping Use: Never used   Substance Use Topics    Alcohol use: Yes     Comment: socially    Drug use: No     Types: Methamphetamines     Comment: Last used 2013        No LMP recorded.           Physical Exam:    Vitals:    11/15/22 1736   BP: 122/70   Pulse: 73   Resp: 16   Temp: 36 °C (96.8 °F)   SpO2: 99%             Physical Exam  Constitutional:       General: She is not in acute distress.     Appearance: Normal appearance. She is not ill-appearing, toxic-appearing or diaphoretic.   Abdominal:      General: Abdomen is flat. Bowel sounds are normal.      Palpations: Abdomen is soft.      Tenderness: There is abdominal tenderness in the suprapubic area and left lower quadrant.   Neurological:      Mental Status: She is alert.   Psychiatric:         Behavior: Behavior is cooperative.                Diagnostics:    None     Medical Decision making and clinic course :  I personally reviewed prior external notes and test results pertinent to today's visit.   Shared decision-making was utilized with patient for treatment plan.  Did discuss differentials with patient to include ovarian cyst ovarian torsion dysmenorrhea, ectopic pregnancy nephrolithiasis and/or diverticulitis.  Did discuss the diagnostic limitations of this clinic.  Patient did opt to seek higher level care instead of obtaining diagnostics here in this clinic.      The patient remained stable during the urgent care  visit.    Plan:         1. Left lower quadrant abdominal pain        Disposition:    Higher level of care via private car.  Patient states she is going to [facility].    Voice Recognition Disclaimer:  Portions of this document were created using voice recognition software. The software does have a chance of producing errors of grammar and possibly content. I have made every reasonable attempt to correct obvious errors, but there may be errors of grammar and possibly content that I did not discover before finalizing the documentation.    Jessa Lew, [email or web address].

## 2022-12-07 RX ORDER — LEVOTHYROXINE SODIUM 0.05 MG/1
TABLET ORAL
Qty: 90 TABLET | Refills: 0 | Status: SHIPPED | OUTPATIENT
Start: 2022-12-07 | End: 2023-04-04

## 2023-01-09 DIAGNOSIS — E78.00 HYPERCHOLESTEROLEMIA: ICD-10-CM

## 2023-01-10 ENCOUNTER — HOSPITAL ENCOUNTER (OUTPATIENT)
Dept: LAB | Facility: MEDICAL CENTER | Age: 28
End: 2023-01-10
Attending: FAMILY MEDICINE
Payer: COMMERCIAL

## 2023-01-10 DIAGNOSIS — E78.00 HYPERCHOLESTEROLEMIA: ICD-10-CM

## 2023-01-10 LAB
ALBUMIN SERPL BCP-MCNC: 4.6 G/DL (ref 3.2–4.9)
ALBUMIN/GLOB SERPL: 1.5 G/DL
ALP SERPL-CCNC: 63 U/L (ref 30–99)
ALT SERPL-CCNC: 21 U/L (ref 2–50)
ANION GAP SERPL CALC-SCNC: 12 MMOL/L (ref 7–16)
AST SERPL-CCNC: 22 U/L (ref 12–45)
BILIRUB SERPL-MCNC: 0.7 MG/DL (ref 0.1–1.5)
BUN SERPL-MCNC: 9 MG/DL (ref 8–22)
CALCIUM ALBUM COR SERPL-MCNC: 8.9 MG/DL (ref 8.5–10.5)
CALCIUM SERPL-MCNC: 9.4 MG/DL (ref 8.5–10.5)
CHLORIDE SERPL-SCNC: 104 MMOL/L (ref 96–112)
CHOLEST SERPL-MCNC: 198 MG/DL (ref 100–199)
CO2 SERPL-SCNC: 21 MMOL/L (ref 20–33)
CREAT SERPL-MCNC: 0.52 MG/DL (ref 0.5–1.4)
FASTING STATUS PATIENT QL REPORTED: NORMAL
GFR SERPLBLD CREATININE-BSD FMLA CKD-EPI: 129 ML/MIN/1.73 M 2
GLOBULIN SER CALC-MCNC: 3 G/DL (ref 1.9–3.5)
GLUCOSE SERPL-MCNC: 82 MG/DL (ref 65–99)
HDLC SERPL-MCNC: 49 MG/DL
LDLC SERPL CALC-MCNC: 125 MG/DL
POTASSIUM SERPL-SCNC: 4.1 MMOL/L (ref 3.6–5.5)
PROT SERPL-MCNC: 7.6 G/DL (ref 6–8.2)
SODIUM SERPL-SCNC: 137 MMOL/L (ref 135–145)
TRIGL SERPL-MCNC: 118 MG/DL (ref 0–149)

## 2023-01-10 PROCEDURE — 80053 COMPREHEN METABOLIC PANEL: CPT

## 2023-01-10 PROCEDURE — 36415 COLL VENOUS BLD VENIPUNCTURE: CPT

## 2023-01-10 PROCEDURE — 80061 LIPID PANEL: CPT

## 2023-02-14 ENCOUNTER — OFFICE VISIT (OUTPATIENT)
Dept: URGENT CARE | Facility: CLINIC | Age: 28
End: 2023-02-14
Payer: COMMERCIAL

## 2023-02-14 VITALS
RESPIRATION RATE: 14 BRPM | HEIGHT: 63 IN | WEIGHT: 188.2 LBS | BODY MASS INDEX: 33.35 KG/M2 | DIASTOLIC BLOOD PRESSURE: 60 MMHG | OXYGEN SATURATION: 98 % | HEART RATE: 72 BPM | TEMPERATURE: 97.9 F | SYSTOLIC BLOOD PRESSURE: 116 MMHG

## 2023-02-14 DIAGNOSIS — R19.7 DIARRHEA, UNSPECIFIED TYPE: ICD-10-CM

## 2023-02-14 DIAGNOSIS — R11.2 NAUSEA AND VOMITING, UNSPECIFIED VOMITING TYPE: ICD-10-CM

## 2023-02-14 PROCEDURE — 99213 OFFICE O/P EST LOW 20 MIN: CPT | Performed by: PHYSICIAN ASSISTANT

## 2023-02-14 RX ORDER — IBUPROFEN 800 MG/1
800 TABLET ORAL EVERY 8 HOURS
COMMUNITY
Start: 2023-01-10 | End: 2023-05-19

## 2023-02-14 RX ORDER — ONDANSETRON 4 MG/1
4 TABLET, ORALLY DISINTEGRATING ORAL EVERY 6 HOURS PRN
Qty: 15 TABLET | Refills: 0 | Status: SHIPPED | OUTPATIENT
Start: 2023-02-14 | End: 2023-03-21

## 2023-02-14 ASSESSMENT — FIBROSIS 4 INDEX: FIB4 SCORE: 0.51

## 2023-02-14 NOTE — LETTER
February 14, 2023         Patient: Juana Infante   YOB: 1995   Date of Visit: 2/14/2023           To Whom it May Concern:    Juana Infante was seen in my clinic on 2/14/2023. Please excuse from work tonight and tomorrow night. May return tomorrow night only if feeling better.     If you have any questions or concerns, please don't hesitate to call.        Sincerely,           Sergio Garcia P.A.-C.  Electronically Signed

## 2023-02-15 NOTE — PROGRESS NOTES
"Subjective:   Juana Infante is a 27 y.o. female who presents for Emesis (X 3 days, diarrhea, headache)      HPI  The patient presents to the Urgent Care with complaints of a headache that started 2 days ago followed by nausea, vomiting, and diarrhea yesterday.  Last vomiting was 5 hours ago.  Her nausea has improved.  She had diarrhea yesterday but not much today but feels like she will have diarrhea from drinking fluids.  Intermittent mild abdominal cramping relieved with having a bowel movement.  No blood or black stools.  Felt feverish and chills yesterday.  Denies any cough, runny nose, CP, SOB, abdominal pain.  Tolerating fluids well.  Decreased appetite.    Medications:    benzonatate  ibuprofen Tabs  levothyroxine Tabs  sertraline Tabs    Allergies: Patient has no known allergies.    Problem List: Juana Infante does not have any pertinent problems on file.    Surgical History:  Past Surgical History:   Procedure Laterality Date    GIANFRANCO BY LAPAROSCOPY N/A 10/13/2020    Procedure: CHOLECYSTECTOMY, LAPAROSCOPIC;  Surgeon: Spencer Nur D.O.;  Location: SURGERY [facility];  Service: General    TONSILLECTOMY         Past Social Hx: Juana Infante  reports that she quit smoking about 9 years ago. She has never used smokeless tobacco. She reports current alcohol use. She reports that she does not use drugs.     Past Family Hx:  Juana Infante family history is not on file.     Problem list, medications, and allergies reviewed by myself today in Epic.     Objective:     /60   Pulse 72   Temp 36.6 °C (97.9 °F) (Temporal)   Resp 14   Ht 1.6 m (5' 3\") Comment: per pt due to wearing shoes  Wt 85.4 kg (188 lb 3.2 oz) Comment: w shoes  LMP 01/20/2023   SpO2 98%   BMI 33.34 kg/m²     Physical Exam  Vitals reviewed.   Constitutional:       General: She is not in acute distress.     Appearance: Normal appearance. She is not ill-appearing or toxic-appearing.   Eyes:      Conjunctiva/sclera: " Conjunctivae normal.      Pupils: Pupils are equal, round, and reactive to light.   Cardiovascular:      Rate and Rhythm: Normal rate and regular rhythm.      Heart sounds: Normal heart sounds.   Pulmonary:      Effort: Pulmonary effort is normal.      Breath sounds: Normal breath sounds.   Abdominal:      General: Abdomen is flat. Bowel sounds are increased. There is no distension.      Palpations: Abdomen is soft.      Tenderness: There is no abdominal tenderness. There is no guarding or rebound. Negative signs include Hutchins's sign and McBurney's sign.   Musculoskeletal:      Cervical back: Neck supple. No rigidity.   Skin:     General: Skin is warm and dry.   Neurological:      General: No focal deficit present.      Mental Status: She is alert and oriented to person, place, and time.   Psychiatric:         Mood and Affect: Mood normal.         Behavior: Behavior normal.       Diagnosis and associated orders:     1. Nausea and vomiting, unspecified vomiting type  - ondansetron (ZOFRAN ODT) 4 MG TABLET DISPERSIBLE; Take 1 Tablet by mouth every 6 hours as needed for Nausea/Vomiting.  Dispense: 15 Tablet; Refill: 0    2. Diarrhea, unspecified type    Other orders  - ibuprofen (MOTRIN) 800 MG Tab; Take 800 mg by mouth every 8 hours.       Comments/MDM:     Patient presenting symptoms and exam findings are most likely consistent with viral gastroenteritis.  Patient is overall very well-appearing in no acute distress.  Normal vital signs.  Tolerating fluids well.  I was unable to elicit any abdominal tenderness on examination.  Recommend symptomatic and supportive care at this time.  She may try Zofran for nausea and vomiting.  Increase fluid intake, Pedialyte, bland/brat diet and increase as tolerated.  Avoid fatty foods or dairy.       I personally reviewed prior external notes and test results pertinent to today's visit. Red flags discussed and indications to present to the Emergency Department. Pathogenesis of  diagnosis discussed including typical length and natural progression. Supportive care, natural history, differential diagnoses, and indications for immediate follow-up discussed. Patient expresses understanding and agrees to plan. Patient denies any other questions or concerns.     Follow-up with the primary care physician for recheck, reevaluation, and consideration of further management.    Please note that this dictation was created using voice recognition software. I have made a reasonable attempt to correct obvious errors, but I expect that there are errors of grammar and possibly content that I did not discover before finalizing the note.    This note was electronically signed by Sergio Garcia PA-C

## 2023-03-12 ENCOUNTER — HOSPITAL ENCOUNTER (OUTPATIENT)
Facility: MEDICAL CENTER | Age: 28
End: 2023-03-12
Attending: PHYSICIAN ASSISTANT
Payer: COMMERCIAL

## 2023-03-12 ENCOUNTER — OFFICE VISIT (OUTPATIENT)
Dept: URGENT CARE | Facility: CLINIC | Age: 28
End: 2023-03-12
Payer: COMMERCIAL

## 2023-03-12 VITALS
RESPIRATION RATE: 14 BRPM | OXYGEN SATURATION: 98 % | TEMPERATURE: 97.4 F | HEIGHT: 63 IN | DIASTOLIC BLOOD PRESSURE: 76 MMHG | HEART RATE: 86 BPM | WEIGHT: 180 LBS | SYSTOLIC BLOOD PRESSURE: 110 MMHG | BODY MASS INDEX: 31.89 KG/M2

## 2023-03-12 DIAGNOSIS — N89.8 VAGINAL DISCHARGE: ICD-10-CM

## 2023-03-12 DIAGNOSIS — Z20.2 ENCOUNTER FOR ASSESSMENT OF STD EXPOSURE: ICD-10-CM

## 2023-03-12 DIAGNOSIS — R30.0 DYSURIA: ICD-10-CM

## 2023-03-12 LAB
APPEARANCE UR: CLEAR
BILIRUB UR STRIP-MCNC: NEGATIVE MG/DL
COLOR UR AUTO: YELLOW
GLUCOSE UR STRIP.AUTO-MCNC: NEGATIVE MG/DL
KETONES UR STRIP.AUTO-MCNC: NEGATIVE MG/DL
LEUKOCYTE ESTERASE UR QL STRIP.AUTO: NEGATIVE
NITRITE UR QL STRIP.AUTO: NEGATIVE
PH UR STRIP.AUTO: 5.5 [PH] (ref 5–8)
POCT INT CON NEG: NEGATIVE
POCT INT CON POS: POSITIVE
POCT URINE PREGNANCY TEST: NEGATIVE
PROT UR QL STRIP: NEGATIVE MG/DL
RBC UR QL AUTO: NEGATIVE
SP GR UR STRIP.AUTO: >=1.03
UROBILINOGEN UR STRIP-MCNC: 0.2 MG/DL

## 2023-03-12 PROCEDURE — 87480 CANDIDA DNA DIR PROBE: CPT

## 2023-03-12 PROCEDURE — 87491 CHLMYD TRACH DNA AMP PROBE: CPT

## 2023-03-12 PROCEDURE — 87510 GARDNER VAG DNA DIR PROBE: CPT

## 2023-03-12 PROCEDURE — 87660 TRICHOMONAS VAGIN DIR PROBE: CPT

## 2023-03-12 PROCEDURE — 81025 URINE PREGNANCY TEST: CPT | Performed by: PHYSICIAN ASSISTANT

## 2023-03-12 PROCEDURE — 81002 URINALYSIS NONAUTO W/O SCOPE: CPT | Performed by: PHYSICIAN ASSISTANT

## 2023-03-12 PROCEDURE — 99213 OFFICE O/P EST LOW 20 MIN: CPT | Performed by: PHYSICIAN ASSISTANT

## 2023-03-12 PROCEDURE — 87086 URINE CULTURE/COLONY COUNT: CPT

## 2023-03-12 PROCEDURE — 87591 N.GONORRHOEAE DNA AMP PROB: CPT

## 2023-03-12 ASSESSMENT — FIBROSIS 4 INDEX: FIB4 SCORE: 0.51

## 2023-03-12 NOTE — LETTER
March 12, 2023    To Whom It May Concern:         This is confirmation that Juana Infante attended her scheduled appointment with Mary Stallworth P.A.-C. on 3/12/23.  Please excuse patient from work today.         If you have any questions please do not hesitate to call me at the phone number listed below.    Sincerely,          Mary Stallworth P.A.-C.  [phone]

## 2023-03-13 DIAGNOSIS — N76.0 BACTERIAL VAGINOSIS: ICD-10-CM

## 2023-03-13 DIAGNOSIS — Z20.2 ENCOUNTER FOR ASSESSMENT OF STD EXPOSURE: ICD-10-CM

## 2023-03-13 DIAGNOSIS — R30.0 DYSURIA: ICD-10-CM

## 2023-03-13 DIAGNOSIS — B96.89 BACTERIAL VAGINOSIS: ICD-10-CM

## 2023-03-13 RX ORDER — METRONIDAZOLE 500 MG/1
500 TABLET ORAL 2 TIMES DAILY
Qty: 14 TABLET | Refills: 0 | Status: SHIPPED | OUTPATIENT
Start: 2023-03-13 | End: 2023-05-19

## 2023-03-13 NOTE — PROGRESS NOTES
"Subjective:   Juana Infante is a 27 y.o. female who presents for UTI (Dysuria, polyuria, x 3 days)     Patient presents with chief complaint of burning with urination x 3 days, mild suprapubic pain.  Feels slightly different than previous UTIs.  She does report some abnormal vaginal discharge.  Nothing malodorous.  She does wish to be tested for STIs noting an unfaithful partner.  She has been having protected sex..  She denies back pain, flank pain, nausea vomiting fever chills.  She has had BV in the past.  She denies gross hematuria.      Medications:  benzonatate  ibuprofen Tabs  levothyroxine Tabs  ondansetron Tbdp  sertraline Tabs    Allergies:             Patient has no known allergies.    Surgical History:         Past Surgical History:   Procedure Laterality Date    GIANFRANCO BY LAPAROSCOPY N/A 10/13/2020    Procedure: CHOLECYSTECTOMY, LAPAROSCOPIC;  Surgeon: Spencer Nur D.O.;  Location: SURGERY [facility];  Service: General    TONSILLECTOMY         Past Social Hx:  Juana Infante  reports that she quit smoking about 9 years ago. She has never used smokeless tobacco. She reports current alcohol use. She reports that she does not use drugs.     Past Family Hx:   Juana Infante family history is not on file.       Problem list, medications, and allergies reviewed by myself today in Epic.     Objective:     /76   Pulse 86   Temp 36.3 °C (97.4 °F)   Resp 14   Ht 1.6 m (5' 3\")   Wt 81.6 kg (180 lb)   LMP 01/20/2023   SpO2 98%   BMI 31.89 kg/m²     Physical Exam  Vitals and nursing note reviewed.   Constitutional:       General: She is not in acute distress.     Appearance: Normal appearance. She is not ill-appearing, toxic-appearing or diaphoretic.   HENT:      Nose: Nose normal.   Eyes:      Extraocular Movements: Extraocular movements intact.   Cardiovascular:      Rate and Rhythm: Normal rate.      Pulses: Normal pulses.      Heart sounds: Normal heart sounds.   Pulmonary:      Effort: " Pulmonary effort is normal. No respiratory distress.      Breath sounds: Normal breath sounds.   Abdominal:      General: There is no distension.      Palpations: Abdomen is soft.      Tenderness: There is no abdominal tenderness. There is no right CVA tenderness, left CVA tenderness, guarding or rebound.      Hernia: No hernia is present.      Comments: No CVA tenderness  Mild suprapubic tenderness   Musculoskeletal:      Cervical back: No rigidity.   Neurological:      Mental Status: She is alert and oriented to person, place, and time.   Psychiatric:         Mood and Affect: Mood normal.         Behavior: Behavior is cooperative.     UA negative  Urine hCG negative  Assessment/Plan:     Diagnosis and Associated Orders:     1. Dysuria  - POCT Urinalysis  - POCT PREGNANCY  - URINE CULTURE(NEW); Future    2. Encounter for assessment of STD exposure  - Chlamydia/GC, PCR (Urine); Future  - VAGINAL PATHOGENS DNA PANEL; Future  - HIV AG/AB COMBO ASSAY SCREENING; Future  - T.PALLIDUM AB RUDDY (SCREENING); Standing    3. Vaginal discharge        Comments/MDM:    No obvious signs of UTI.  Vaginal pathogens collected.  Offered treatment for STI today.  Patient would like to wait for results.  Vital signs stable and reassuring.  We will send urine for culture given dysuria which I think is unlikely to be related to UTI.  Future order placed for HIV syphilis blood draws, discussed timing.    I personally reviewed prior external notes and test results pertinent to today's visit.  Red flags discussed as well as indications to present to the Emergency Department.  Supportive care, natural history, differential diagnoses, and indications for immediate follow-up discussed.  Patient expresses understanding and agrees to plan.  Patient denies any other questions or concerns.    Follow-up with the primary care physician for recheck, reevaluation, and consideration of further management.      Please note that this dictation was created  using voice recognition software. I have made a reasonable attempt to correct obvious errors, but I expect that there are errors of grammar and possibly content that I did not discover before finalizing the note.    This note was electronically signed by Mary Stallworth PA-C

## 2023-03-15 LAB
BACTERIA UR CULT: NORMAL
SIGNIFICANT IND 70042: NORMAL
SITE SITE: NORMAL
SOURCE SOURCE: NORMAL

## 2023-03-21 ENCOUNTER — OFFICE VISIT (OUTPATIENT)
Dept: URGENT CARE | Facility: PHYSICIAN GROUP | Age: 28
End: 2023-03-21
Payer: COMMERCIAL

## 2023-03-21 VITALS
HEART RATE: 85 BPM | HEIGHT: 63 IN | RESPIRATION RATE: 18 BRPM | DIASTOLIC BLOOD PRESSURE: 62 MMHG | SYSTOLIC BLOOD PRESSURE: 116 MMHG | BODY MASS INDEX: 31.89 KG/M2 | WEIGHT: 180 LBS | OXYGEN SATURATION: 98 % | TEMPERATURE: 98 F

## 2023-03-21 DIAGNOSIS — R11.2 NAUSEA AND VOMITING, UNSPECIFIED VOMITING TYPE: ICD-10-CM

## 2023-03-21 DIAGNOSIS — A08.4 VIRAL GASTROENTERITIS: ICD-10-CM

## 2023-03-21 DIAGNOSIS — R52 BODY ACHES: ICD-10-CM

## 2023-03-21 LAB
FLUAV RNA SPEC QL NAA+PROBE: NEGATIVE
FLUBV RNA SPEC QL NAA+PROBE: NEGATIVE
RSV RNA SPEC QL NAA+PROBE: NEGATIVE
SARS-COV-2 RNA RESP QL NAA+PROBE: NEGATIVE

## 2023-03-21 PROCEDURE — 99213 OFFICE O/P EST LOW 20 MIN: CPT

## 2023-03-21 PROCEDURE — 0241U POCT CEPHEID COV-2, FLU A/B, RSV - PCR: CPT

## 2023-03-21 RX ORDER — ONDANSETRON 4 MG/1
4 TABLET, FILM COATED ORAL EVERY 4 HOURS PRN
Qty: 20 TABLET | Refills: 0 | Status: SHIPPED | OUTPATIENT
Start: 2023-03-21 | End: 2023-03-26

## 2023-03-21 ASSESSMENT — ENCOUNTER SYMPTOMS
SORE THROAT: 0
CHILLS: 1
BLOOD IN STOOL: 0
DIARRHEA: 0
NAUSEA: 1
SHORTNESS OF BREATH: 0
FEVER: 0
MYALGIAS: 1
COUGH: 0
CONSTIPATION: 0
VOMITING: 1
HEADACHES: 1

## 2023-03-21 ASSESSMENT — FIBROSIS 4 INDEX: FIB4 SCORE: 0.51

## 2023-03-21 NOTE — LETTER
March 21, 2023    To Whom It May Concern:         This is confirmation that Juana Infante attended her scheduled appointment with Sabrina Cai P.A.-C. on 3/21/23. Please excuse her absence from work yesterday evening. She may return to work in 1-3 days if feeling well.          If you have any questions please do not hesitate to call me at the phone number listed below.    Sincerely,          Sabrina Cai P.A.-C.  [phone]

## 2023-03-21 NOTE — PROGRESS NOTES
Subjective:     CHIEF COMPLAINT  Chief Complaint   Patient presents with    Emesis     X 3 days vomiting, Body aches, headache, chills       HPI  Juana Infante is a very pleasant 27 y.o. female who presents with three days of vomiting, chills, body aches, and a headache. She has not COVID tested at home. She is able to drink small amounts of fluids. She reports a reduced appetite with nausea and vomiting upon eating. Does not report diarrhea. No known fevers. No chest pain or shortness of breath.     REVIEW OF SYSTEMS  Review of Systems   Constitutional:  Positive for chills and malaise/fatigue. Negative for fever.   HENT:  Negative for ear pain and sore throat.    Respiratory:  Negative for cough and shortness of breath.    Cardiovascular:  Negative for chest pain.   Gastrointestinal:  Positive for nausea and vomiting. Negative for blood in stool, constipation and diarrhea.   Musculoskeletal:  Positive for myalgias.   Neurological:  Positive for headaches.     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    ALLERGIES  No Known Allergies    CURRENT MEDICATIONS  Home Medications       Reviewed by Sabrina Cai P.A.-C. (Physician Assistant) on 03/21/23 at 1330  Med List Status: <None>     Medication Last Dose Status   benzonatate (TESSALON) 200 MG capsule Not Taking Active   ibuprofen (MOTRIN) 800 MG Tab Not Taking Active   levothyroxine (SYNTHROID) 50 MCG Tab Taking Active   metroNIDAZOLE (FLAGYL) 500 MG Tab Not Taking Active   ondansetron (ZOFRAN ODT) 4 MG TABLET DISPERSIBLE Not Taking Active   sertraline (ZOLOFT) 50 MG Tab Not  "Taking Active                    SOCIAL HISTORY  Social History     Tobacco Use    Smoking status: Former     Types: Cigarettes     Quit date: 2013     Years since quittin.8    Smokeless tobacco: Never   Vaping Use    Vaping Use: Never used   Substance and Sexual Activity    Alcohol use: Yes     Comment: socially    Drug use: No     Types: Methamphetamines     Comment: Last used 2013    Sexual activity: Yes     Partners: Male     Comment: None       FAMILY HISTORY  History reviewed. No pertinent family history.       Objective:     VITAL SIGNS: /62 (BP Location: Left arm, Patient Position: Sitting, BP Cuff Size: Adult)   Pulse 85   Temp 36.7 °C (98 °F) (Temporal)   Resp 18   Ht 1.6 m (5' 3\")   Wt 81.6 kg (180 lb)   SpO2 98%   BMI 31.89 kg/m²     PHYSICAL EXAM  Physical Exam  Constitutional:       Appearance: She is normal weight. She is ill-appearing. She is not toxic-appearing.   HENT:      Head: Normocephalic and atraumatic.      Left Ear: External ear normal.      Nose: Nose normal.      Mouth/Throat:      Mouth: Mucous membranes are moist.      Pharynx: Posterior oropharyngeal erythema present. No oropharyngeal exudate.   Eyes:      Conjunctiva/sclera: Conjunctivae normal.   Cardiovascular:      Rate and Rhythm: Normal rate and regular rhythm.      Heart sounds: Normal heart sounds.   Pulmonary:      Effort: Pulmonary effort is normal.      Breath sounds: Normal breath sounds. No wheezing, rhonchi or rales.   Abdominal:      General: Abdomen is flat.      Tenderness: There is no right CVA tenderness or left CVA tenderness.   Skin:     General: Skin is warm and dry.      Coloration: Skin is pale.   Neurological:      General: No focal deficit present.      Mental Status: She is alert and oriented to person, place, and time.   Psychiatric:         Mood and Affect: Mood normal.       Assessment/Plan:     1. Body aches  - POCT CEPHEID COV-2, FLU A/B, RSV - PCR    2. Nausea and vomiting, " unspecified vomiting type  - ondansetron (ZOFRAN) 4 MG Tab tablet; Take 1 Tablet by mouth every four hours as needed for Nausea/Vomiting for up to 5 days.  Dispense: 20 Tablet; Refill: 0    3. Viral gastroenteritis    Results:  COVID negative    MDM/Comments:  Patient has stable vital signs and is non-toxic appearing. Discussed supportive care with hydration, rest, Tylenol/Ibuprofen as needed. COVID testing done in office. Zofran prescribed. Patient instructed to eat a bland diet including rice, toast, apple sauce, etc. If symptoms worsen or unable to tolerate fluids, be seen at ER for IV hydration.     Differential diagnosis, natural history, supportive care, and indications for immediate follow-up discussed. All questions answered. Patient agrees with the plan of care.    Follow-up as needed if symptoms worsen or fail to improve to PCP, Urgent care or Emergency Room.    I have personally reviewed prior external notes and test results pertinent to today's visit.  I have independently reviewed and interpreted all diagnostics ordered during this urgent care acute visit.   Discussed management options (risks,benefits, and alternatives to treatment). Pt expresses understanding and the treatment plan was agreed upon. Questions were encouraged and answered to pt's satisfaction.    Please note that this dictation was created using voice recognition software. I have made a reasonable attempt to correct obvious errors, but I expect that there are errors of grammar and possibly content that I did not discover before finalizing the note.

## 2023-04-04 RX ORDER — LEVOTHYROXINE SODIUM 0.05 MG/1
TABLET ORAL
Qty: 90 TABLET | Refills: 1 | Status: SHIPPED | OUTPATIENT
Start: 2023-04-04 | End: 2023-10-26

## 2023-04-09 ENCOUNTER — HOSPITAL ENCOUNTER (EMERGENCY)
Facility: MEDICAL CENTER | Age: 28
End: 2023-04-09
Attending: EMERGENCY MEDICINE
Payer: COMMERCIAL

## 2023-04-09 ENCOUNTER — APPOINTMENT (OUTPATIENT)
Dept: RADIOLOGY | Facility: MEDICAL CENTER | Age: 28
End: 2023-04-09
Attending: EMERGENCY MEDICINE
Payer: COMMERCIAL

## 2023-04-09 VITALS
HEIGHT: 63 IN | HEART RATE: 70 BPM | WEIGHT: 180 LBS | TEMPERATURE: 97.8 F | DIASTOLIC BLOOD PRESSURE: 68 MMHG | BODY MASS INDEX: 31.89 KG/M2 | RESPIRATION RATE: 16 BRPM | OXYGEN SATURATION: 97 % | SYSTOLIC BLOOD PRESSURE: 112 MMHG

## 2023-04-09 DIAGNOSIS — M79.672 LEFT FOOT PAIN: ICD-10-CM

## 2023-04-09 DIAGNOSIS — M25.572 ACUTE LEFT ANKLE PAIN: ICD-10-CM

## 2023-04-09 PROCEDURE — 700102 HCHG RX REV CODE 250 W/ 637 OVERRIDE(OP): Performed by: EMERGENCY MEDICINE

## 2023-04-09 PROCEDURE — A9270 NON-COVERED ITEM OR SERVICE: HCPCS | Performed by: EMERGENCY MEDICINE

## 2023-04-09 PROCEDURE — 73630 X-RAY EXAM OF FOOT: CPT | Mod: LT

## 2023-04-09 PROCEDURE — 99284 EMERGENCY DEPT VISIT MOD MDM: CPT

## 2023-04-09 PROCEDURE — 73610 X-RAY EXAM OF ANKLE: CPT | Mod: LT

## 2023-04-09 PROCEDURE — 29515 APPLICATION SHORT LEG SPLINT: CPT

## 2023-04-09 PROCEDURE — 302874 HCHG BANDAGE ACE 2 OR 3""

## 2023-04-09 RX ORDER — IBUPROFEN 600 MG/1
600 TABLET ORAL ONCE
Status: COMPLETED | OUTPATIENT
Start: 2023-04-09 | End: 2023-04-09

## 2023-04-09 RX ADMIN — IBUPROFEN 600 MG: 600 TABLET, FILM COATED ORAL at 12:04

## 2023-04-09 ASSESSMENT — FIBROSIS 4 INDEX: FIB4 SCORE: 0.51

## 2023-04-09 NOTE — ED PROVIDER NOTES
ED Provider Note    CHIEF COMPLAINT  Chief Complaint   Patient presents with    Ankle Pain     Pt to triage c/o left ankle pain after falling 1 step. States she also twisted her ankle. Unable to bear weight on left ankle d/t pain. +tingling in left foot. Cms intact. Normal cap refill >2 seconds.        EXTERNAL RECORDS REVIEWED  Outpatient Notes Office visit 3/21/23    HPI/ROS  LIMITATION TO HISTORY   Select: : None  OUTSIDE HISTORIAN(S):  Family     Juana Infante is a 27 y.o. female who presents to the emergency department for evaluation of ankle pain.  The patient states that last night she was walking down steps at her home.  She states that on the last step she rolled her left ankle.  She states that since then she has had pain and swelling of the ankle.  She has been unable to bear weight on the extremity secondary to discomfort.  She denies falling or hitting her head.  She denies any other injuries.  She does admit to tingling in the foot but denies any numbness.  She has otherwise been well with no fevers, coughing, chest pain, shortness of breath, nausea, vomiting, or abdominal pain.    PAST MEDICAL HISTORY   has a past medical history of Anxiety (), Depression (), and Migraine.    SURGICAL HISTORY   has a past surgical history that includes elkin by laparoscopy (N/A, 10/13/2020) and tonsillectomy.    FAMILY HISTORY  No family history on file.    SOCIAL HISTORY  Social History     Tobacco Use    Smoking status: Former     Types: Cigarettes     Quit date: 2013     Years since quittin.8    Smokeless tobacco: Never   Vaping Use    Vaping Use: Never used   Substance and Sexual Activity    Alcohol use: Yes     Comment: socially    Drug use: No     Types: Methamphetamines     Comment: Last used 2013    Sexual activity: Yes     Partners: Male     Comment: None     CURRENT MEDICATIONS  Home Medications       Reviewed by Maye Solares R.N. (Registered Nurse) on 23 at 1113  Med  "List Status: Partial     Medication Last Dose Status   benzonatate (TESSALON) 200 MG capsule  Active   ibuprofen (MOTRIN) 800 MG Tab  Active   levothyroxine (SYNTHROID) 50 MCG Tab  Active   metroNIDAZOLE (FLAGYL) 500 MG Tab  Active   sertraline (ZOLOFT) 50 MG Tab  Active                  ALLERGIES  No Known Allergies    PHYSICAL EXAM  VITAL SIGNS: /71   Pulse 75   Temp 36.8 °C (98.2 °F) (Temporal)   Resp 16   Ht 1.6 m (5' 3\")   Wt 81.6 kg (180 lb)   LMP 03/25/2023   SpO2 96%   BMI 31.89 kg/m²   Constitutional: Alert and in no apparent distress.  Neck: Normal range of motion without tenderness. Supple. No midline cervical spine tenderness.  Cardiovascular: Regular rate and rhythm. No murmurs, gallops or rubs.  Thorax & Lungs: No retractions, nasal flaring, or tachypnea. Breath sounds are clear to auscultation bilaterally. No wheezing, rhonchi or rales.  Back: No bony tenderness, No CVA tenderness.   Extremities: 2+ peripheral pulses. Cap refill is less than 2 seconds. No edema, cyanosis, or clubbing.  Musculoskeletal: Good range of motion in all major joints. No tenderness to palpation or major deformities noted.  Focused exam of the left lower extremity: There is swelling and tenderness palpation over the lateral malleolus and inferior to the lateral malleolus.  No step-off deformities noted.  No tenderness palpation over the medial malleolus.  There is tenderness palpation over the head of the fifth metatarsal.  2+ dorsalis pedis pulse.  Sensation is grossly intact.  No tenderness palpation over the forefoot.  Neurologic: Alert and appropriate for age. The patient moves all 4 extremities without obvious deficits.    DIAGNOSTIC STUDIES / PROCEDURES    RADIOLOGY  I have independently interpreted the diagnostic imaging associated with this visit and am waiting the final reading from the radiologist.   My preliminary interpretation is as follows: No obvious fracture noted  Radiologist interpretation: "   DX-ANKLE 3+ VIEWS LEFT   Final Result      Normal ankle series.      DX-FOOT-COMPLETE 3+ LEFT   Final Result      Normal foot series.        COURSE & MEDICAL DECISION MAKING    ED Observation Status? No; Patient does not meet criteria for ED Observation.     INITIAL ASSESSMENT, COURSE AND PLAN  Care Narrative: This is a 27-year-old female presenting to the ED for evaluation of ankle pain after an injury.  On initial evaluation, the patient did not appear to be in any acute distress.  Her vital signs were normal and reassuring.  Physical exam was notable for swelling and tenderness palpation over the lateral aspect of the left ankle.  She was grossly neurovascularly intact and no additional evidence of traumatic injury was noted.    Plain films of the left ankle and foot were obtained.  No obvious occult fracture or dislocation was noted.    The patient was treated with ibuprofen and given an ice pack.  Upon reassessment, the patient continued to have tenderness palpation over the head of the fifth metatarsal.  The plan was made to treat conservatively with a splint and nonweightbearing status until she can follow-up with Ortho.  She was given information for the orthopedic surgeon on-call and instructed to follow-up.  I encouraged supportive management with ibuprofen, Tylenol, rest, elevation, and ice for symptomatic relief.  She understands return to the ED with any worsening signs or symptoms.    ADDITIONAL PROBLEM LIST  Foot and ankle pain after injury  DISPOSITION AND DISCUSSIONS  I have discussed management of the patient with the following physicians and BIPIN's: None    Discussion of management with other QHP or appropriate source(s): None     Escalation of care considered, and ultimately not performed:diagnostic imaging and acute inpatient care management, however at this time, the patient is most appropriate for outpatient management    Barriers to care at this time, including but not limited to:  None .      Decision tools and prescription drugs considered including, but not limited to:  None .    FINAL IMPRESSION  1. Acute left ankle pain    2. Left foot pain      PRESCRIPTIONS  New Prescriptions    No medications on file     FOLLOW UP  Daquan Bunch M.D.  [address]  [facility] [id number] [phone]    Call in 1 day  To schedule a follow up appointment    [facility], Emergency Dept  [facility] [facility] [address] [phone]  Go to   As needed    -DISCHARGE-    Electronically signed by: Caitie Moreno D.O., 4/9/2023 11:53 AM

## 2023-04-09 NOTE — ED NOTES
Discharge teaching and paperwork provided and all questions/concerns answered. VSS, assessment stable. Patient discharged to the care of self/significant other and wheeled out of the ED. Pt aware to stay non-weight bearing. Understands to contact ortho in AM for follow up appt.

## 2023-05-19 ENCOUNTER — OFFICE VISIT (OUTPATIENT)
Dept: MEDICAL GROUP | Facility: PHYSICIAN GROUP | Age: 28
End: 2023-05-19
Payer: COMMERCIAL

## 2023-05-19 VITALS
OXYGEN SATURATION: 94 % | SYSTOLIC BLOOD PRESSURE: 118 MMHG | DIASTOLIC BLOOD PRESSURE: 70 MMHG | TEMPERATURE: 99.2 F | WEIGHT: 188.2 LBS | HEART RATE: 74 BPM | HEIGHT: 63 IN | RESPIRATION RATE: 16 BRPM | BODY MASS INDEX: 33.35 KG/M2

## 2023-05-19 DIAGNOSIS — R63.5 WEIGHT GAIN: ICD-10-CM

## 2023-05-19 DIAGNOSIS — E55.9 VITAMIN D DEFICIENCY: ICD-10-CM

## 2023-05-19 DIAGNOSIS — N92.0 MENORRHAGIA WITH REGULAR CYCLE: ICD-10-CM

## 2023-05-19 DIAGNOSIS — E78.00 ELEVATED LDL CHOLESTEROL LEVEL: ICD-10-CM

## 2023-05-19 PROCEDURE — 3078F DIAST BP <80 MM HG: CPT | Performed by: FAMILY MEDICINE

## 2023-05-19 PROCEDURE — 3074F SYST BP LT 130 MM HG: CPT | Performed by: FAMILY MEDICINE

## 2023-05-19 PROCEDURE — 99214 OFFICE O/P EST MOD 30 MIN: CPT | Performed by: FAMILY MEDICINE

## 2023-05-19 ASSESSMENT — FIBROSIS 4 INDEX: FIB4 SCORE: 0.51

## 2023-05-19 ASSESSMENT — PATIENT HEALTH QUESTIONNAIRE - PHQ9: CLINICAL INTERPRETATION OF PHQ2 SCORE: 0

## 2023-05-19 NOTE — PROGRESS NOTES
"Subjective:     CC:   Chief Complaint   Patient presents with    Follow-Up       HPI:   Juana presents today for follow-up.  Patient did have a foot issue and was off work and now she has been released back to work.  Patient admits that she just needed a reset and that time off was helpful for her.  Patient feels at this time she is not depressed so stopped taking her sertraline.  Patient is concerned because she is gaining weight.  Patient does have monthly menstrual cycles she bleeds for about 6 days.    Past Medical History:   Diagnosis Date    Anxiety     Depression     Migraine        Social History     Tobacco Use    Smoking status: Former     Types: Cigarettes     Quit date: 2013     Years since quittin.9    Smokeless tobacco: Never   Vaping Use    Vaping Use: Never used   Substance Use Topics    Alcohol use: Yes     Comment: socially    Drug use: No     Types: Methamphetamines     Comment: Last used 2013       Current Outpatient Medications Ordered in Epic   Medication Sig Dispense Refill    levothyroxine (SYNTHROID) 50 MCG Tab TAKE 1 TABLET BY MOUTH EVERY DAY IN THE MORNING ON AN EMPTY STOMACH 90 Tablet 1     No current Epic-ordered facility-administered medications on file.       Allergies:  Patient has no known allergies.    Health Maintenance: Completed    ROS:  Gen: no fevers/chills, patient has gained weight  Eyes: no changes in vision  ENT: no sore throat, no hearing loss, no bloody nose  Pulm: no sob, no cough  CV: no chest pain, no palpitations  GI: no nausea/vomiting, no diarrhea  Neuro: no headaches, no numbness/tingling  Heme/Lymph: no easy bruising    Objective:     Exam:  /70 (BP Location: Left arm, Patient Position: Sitting, BP Cuff Size: Adult)   Pulse 74   Temp 37.3 °C (99.2 °F) (Temporal)   Resp 16   Ht 1.6 m (5' 3\")   Wt 85.4 kg (188 lb 3.2 oz)   LMP 2023   SpO2 94%   BMI 33.34 kg/m²  Body mass index is 33.34 kg/m².    Gen: Alert and oriented, No " apparent distress.  Skin: Warm and dry.  No obvious lesions.  Eyes: Sclera wnl Pupils normal in size  Lungs: Normal effort, CTA bilaterally, no wheezes, rhonchi, or rales  CV: Regular rate and rhythm. No murmurs, rubs, or gallops.  Musculoskeletal: Normal gait. No extremity cyanosis, clubbing, or edema.  Neuro: Oriented to person, place and time  Psych: Mood is wnl     Labs: Nonfasting labs were ordered    Assessment & Plan:     27 y.o. female with the following -     1. Weight gain  I recommend ordering some lab work on patient we will see her back in 2 to 3 weeks  - Comp Metabolic Panel; Future  - TSH; Future    2. Menorrhagia with regular cycle  I recommend checking her for anemia  - CBC WITH DIFFERENTIAL; Future  - TSH; Future    3. Vitamin D deficiency  I recommend checking her vitamin D level  - VITAMIN D,25 HYDROXY (DEFICIENCY); Future    4. Elevated LDL cholesterol level  Patient's LDL is still elevated but her cholesterol is better may need to consider rechecking this in about 4 to 5 months       Return in about 3 weeks (around 6/9/2023), or if symptoms worsen or fail to improve.    Please note that this dictation was created using voice recognition software. I have made every reasonable attempt to correct obvious errors, but I expect that there are errors of grammar and possibly content that I did not discover before finalizing the note.

## 2023-05-29 ENCOUNTER — OFFICE VISIT (OUTPATIENT)
Dept: URGENT CARE | Facility: PHYSICIAN GROUP | Age: 28
End: 2023-05-29
Payer: COMMERCIAL

## 2023-05-29 VITALS
DIASTOLIC BLOOD PRESSURE: 80 MMHG | OXYGEN SATURATION: 97 % | BODY MASS INDEX: 31.89 KG/M2 | WEIGHT: 180 LBS | TEMPERATURE: 99.5 F | HEIGHT: 63 IN | RESPIRATION RATE: 16 BRPM | HEART RATE: 76 BPM | SYSTOLIC BLOOD PRESSURE: 124 MMHG

## 2023-05-29 DIAGNOSIS — J02.9 PHARYNGITIS, UNSPECIFIED ETIOLOGY: ICD-10-CM

## 2023-05-29 DIAGNOSIS — R05.1 ACUTE COUGH: ICD-10-CM

## 2023-05-29 DIAGNOSIS — U07.1 COVID-19: ICD-10-CM

## 2023-05-29 LAB
FLUAV RNA SPEC QL NAA+PROBE: NEGATIVE
FLUBV RNA SPEC QL NAA+PROBE: NEGATIVE
INT CON NEG: NEGATIVE
INT CON POS: POSITIVE
RSV RNA SPEC QL NAA+PROBE: NEGATIVE
S PYO AG THROAT QL: NEGATIVE
SARS-COV-2 RNA RESP QL NAA+PROBE: POSITIVE

## 2023-05-29 PROCEDURE — 87880 STREP A ASSAY W/OPTIC: CPT | Performed by: FAMILY MEDICINE

## 2023-05-29 PROCEDURE — 3074F SYST BP LT 130 MM HG: CPT | Performed by: FAMILY MEDICINE

## 2023-05-29 PROCEDURE — 0241U POCT CEPHEID COV-2, FLU A/B, RSV - PCR: CPT | Performed by: FAMILY MEDICINE

## 2023-05-29 PROCEDURE — 3079F DIAST BP 80-89 MM HG: CPT | Performed by: FAMILY MEDICINE

## 2023-05-29 PROCEDURE — 99213 OFFICE O/P EST LOW 20 MIN: CPT | Performed by: FAMILY MEDICINE

## 2023-05-29 RX ORDER — DEXTROMETHORPHAN HYDROBROMIDE AND PROMETHAZINE HYDROCHLORIDE 15; 6.25 MG/5ML; MG/5ML
5 SYRUP ORAL 4 TIMES DAILY PRN
Qty: 120 ML | Refills: 0 | Status: SHIPPED | OUTPATIENT
Start: 2023-05-29 | End: 2023-07-29

## 2023-05-29 RX ORDER — LIDOCAINE HYDROCHLORIDE 20 MG/ML
15 SOLUTION OROPHARYNGEAL EVERY 4 HOURS PRN
Qty: 120 ML | Refills: 0 | Status: SHIPPED | OUTPATIENT
Start: 2023-05-29 | End: 2023-07-29

## 2023-05-29 ASSESSMENT — ENCOUNTER SYMPTOMS
NAUSEA: 0
EYE DISCHARGE: 0
EYE REDNESS: 0
MYALGIAS: 0
VOMITING: 0
WEIGHT LOSS: 0

## 2023-05-29 ASSESSMENT — FIBROSIS 4 INDEX: FIB4 SCORE: 0.51

## 2023-05-29 NOTE — PROGRESS NOTES
"Subjective     Juana Infante is a 27 y.o. female who presents with URI (X 3 days with headache, sore throat, body aches, cough, chills)            2 days HA, body ache, ST, dry cough, subjective fever, chills. No NVD. No known exposures. Tolerating fluids with normal urine output. No SOB/wheeze. No known exposures. PMH C19 9/2022No other aggravating or alleviating factors.          Review of Systems   Constitutional:  Negative for malaise/fatigue and weight loss.   Eyes:  Negative for discharge and redness.   Gastrointestinal:  Negative for nausea and vomiting.   Musculoskeletal:  Negative for joint pain and myalgias.   Skin:  Negative for itching and rash.              Objective     /80 (BP Location: Left arm, Patient Position: Sitting, BP Cuff Size: Adult long)   Pulse 76   Temp 37.5 °C (99.5 °F) (Temporal)   Resp 16   Ht 1.6 m (5' 3\")   Wt 81.6 kg (180 lb)   LMP 04/26/2023   SpO2 97%   BMI 31.89 kg/m²      Physical Exam  Constitutional:       General: She is not in acute distress.     Appearance: She is well-developed.   HENT:      Head: Normocephalic and atraumatic.      Right Ear: Tympanic membrane normal.      Left Ear: Tympanic membrane normal.      Nose: Congestion present.      Mouth/Throat:      Pharynx: Posterior oropharyngeal erythema present.   Eyes:      Conjunctiva/sclera: Conjunctivae normal.   Cardiovascular:      Rate and Rhythm: Normal rate and regular rhythm.      Heart sounds: Normal heart sounds. No murmur heard.  Pulmonary:      Effort: Pulmonary effort is normal.      Breath sounds: Normal breath sounds. No wheezing.   Musculoskeletal:      Cervical back: Neck supple.   Lymphadenopathy:      Cervical: No cervical adenopathy.   Skin:     General: Skin is warm and dry.      Findings: No rash.   Neurological:      Mental Status: She is alert.                             Assessment & Plan      Poct strep negative  Poct COVID-19 +    1. Acute cough  POCT CoV-2, Flu A/B, RSV by PCR "    promethazine-dextromethorphan (PROMETHAZINE-DM) 6.25-15 MG/5ML syrup      2. Pharyngitis, unspecified etiology  POCT CoV-2, Flu A/B, RSV by PCR    POCT Rapid Strep A    lidocaine (XYLOCAINE) 2 % Solution      3. COVID-19          Differential diagnosis, natural history, supportive care, and indications for immediate follow-up were discussed.

## 2023-05-29 NOTE — LETTER
May 29, 2023         Patient: Juana Infante   YOB: 1995   Date of Visit: 5/29/2023           To Whom it May Concern:    Juana Infante was seen in my clinic on 5/29/2023. Please excuse work absences due to COVID-19. She may return to full duty Friday 6/2/2023 if symptoms are improving and without fever >100.4F for 24 hours.       Sincerely,           Dwaine Francois M.D.  Electronically Signed

## 2023-05-29 NOTE — LETTER
May 29, 2023         Patient: Juana Infante   YOB: 1995   Date of Visit: 5/29/2023           To Whom it May Concern:    Juana Infante was seen in my clinic on 5/29/2023. Please excuse from work 5/29 and 5/30/2023. She may then return to her next scheduled shift to full duty.      Sincerely,           Dwaine Francois M.D.  Electronically Signed

## 2023-06-02 ENCOUNTER — HOSPITAL ENCOUNTER (OUTPATIENT)
Dept: LAB | Facility: MEDICAL CENTER | Age: 28
End: 2023-06-02
Attending: FAMILY MEDICINE
Payer: COMMERCIAL

## 2023-06-02 DIAGNOSIS — N92.0 MENORRHAGIA WITH REGULAR CYCLE: ICD-10-CM

## 2023-06-02 DIAGNOSIS — E55.9 VITAMIN D DEFICIENCY: ICD-10-CM

## 2023-06-02 DIAGNOSIS — R63.5 WEIGHT GAIN: ICD-10-CM

## 2023-06-02 LAB
BASOPHILS # BLD AUTO: 0.3 % (ref 0–1.8)
BASOPHILS # BLD: 0.02 K/UL (ref 0–0.12)
EOSINOPHIL # BLD AUTO: 0.1 K/UL (ref 0–0.51)
EOSINOPHIL NFR BLD: 1.7 % (ref 0–6.9)
ERYTHROCYTE [DISTWIDTH] IN BLOOD BY AUTOMATED COUNT: 39.5 FL (ref 35.9–50)
HCT VFR BLD AUTO: 40 % (ref 37–47)
HGB BLD-MCNC: 13.6 G/DL (ref 12–16)
IMM GRANULOCYTES # BLD AUTO: 0.01 K/UL (ref 0–0.11)
IMM GRANULOCYTES NFR BLD AUTO: 0.2 % (ref 0–0.9)
LYMPHOCYTES # BLD AUTO: 2.8 K/UL (ref 1–4.8)
LYMPHOCYTES NFR BLD: 46.7 % (ref 22–41)
MCH RBC QN AUTO: 29.2 PG (ref 27–33)
MCHC RBC AUTO-ENTMCNC: 34 G/DL (ref 32.2–35.5)
MCV RBC AUTO: 85.8 FL (ref 81.4–97.8)
MONOCYTES # BLD AUTO: 0.2 K/UL (ref 0–0.85)
MONOCYTES NFR BLD AUTO: 3.3 % (ref 0–13.4)
NEUTROPHILS # BLD AUTO: 2.87 K/UL (ref 1.82–7.42)
NEUTROPHILS NFR BLD: 47.8 % (ref 44–72)
NRBC # BLD AUTO: 0 K/UL
NRBC BLD-RTO: 0 /100 WBC (ref 0–0.2)
PLATELET # BLD AUTO: 255 K/UL (ref 164–446)
PMV BLD AUTO: 10.3 FL (ref 9–12.9)
RBC # BLD AUTO: 4.66 M/UL (ref 4.2–5.4)
WBC # BLD AUTO: 6 K/UL (ref 4.8–10.8)

## 2023-06-02 PROCEDURE — 82306 VITAMIN D 25 HYDROXY: CPT

## 2023-06-02 PROCEDURE — 85025 COMPLETE CBC W/AUTO DIFF WBC: CPT

## 2023-06-02 PROCEDURE — 80053 COMPREHEN METABOLIC PANEL: CPT

## 2023-06-02 PROCEDURE — 84443 ASSAY THYROID STIM HORMONE: CPT

## 2023-06-02 PROCEDURE — 36415 COLL VENOUS BLD VENIPUNCTURE: CPT

## 2023-06-03 LAB
25(OH)D3 SERPL-MCNC: 16 NG/ML (ref 30–100)
ALBUMIN SERPL BCP-MCNC: 4.7 G/DL (ref 3.2–4.9)
ALBUMIN/GLOB SERPL: 1.4 G/DL
ALP SERPL-CCNC: 80 U/L (ref 30–99)
ALT SERPL-CCNC: 36 U/L (ref 2–50)
ANION GAP SERPL CALC-SCNC: 13 MMOL/L (ref 7–16)
AST SERPL-CCNC: 35 U/L (ref 12–45)
BILIRUB SERPL-MCNC: 0.3 MG/DL (ref 0.1–1.5)
BUN SERPL-MCNC: 9 MG/DL (ref 8–22)
CALCIUM ALBUM COR SERPL-MCNC: 9.2 MG/DL (ref 8.5–10.5)
CALCIUM SERPL-MCNC: 9.8 MG/DL (ref 8.5–10.5)
CHLORIDE SERPL-SCNC: 104 MMOL/L (ref 96–112)
CO2 SERPL-SCNC: 23 MMOL/L (ref 20–33)
CREAT SERPL-MCNC: 0.62 MG/DL (ref 0.5–1.4)
GFR SERPLBLD CREATININE-BSD FMLA CKD-EPI: 123 ML/MIN/1.73 M 2
GLOBULIN SER CALC-MCNC: 3.4 G/DL (ref 1.9–3.5)
GLUCOSE SERPL-MCNC: 104 MG/DL (ref 65–99)
POTASSIUM SERPL-SCNC: 4.2 MMOL/L (ref 3.6–5.5)
PROT SERPL-MCNC: 8.1 G/DL (ref 6–8.2)
SODIUM SERPL-SCNC: 140 MMOL/L (ref 135–145)
TSH SERPL DL<=0.005 MIU/L-ACNC: 2.64 UIU/ML (ref 0.38–5.33)

## 2023-07-29 ENCOUNTER — HOSPITAL ENCOUNTER (OUTPATIENT)
Facility: MEDICAL CENTER | Age: 28
End: 2023-07-29
Attending: FAMILY MEDICINE
Payer: COMMERCIAL

## 2023-07-29 ENCOUNTER — OFFICE VISIT (OUTPATIENT)
Dept: URGENT CARE | Facility: CLINIC | Age: 28
End: 2023-07-29
Payer: COMMERCIAL

## 2023-07-29 VITALS
SYSTOLIC BLOOD PRESSURE: 102 MMHG | OXYGEN SATURATION: 98 % | WEIGHT: 188.2 LBS | HEIGHT: 63 IN | BODY MASS INDEX: 33.35 KG/M2 | DIASTOLIC BLOOD PRESSURE: 78 MMHG | TEMPERATURE: 98.5 F | RESPIRATION RATE: 16 BRPM | HEART RATE: 71 BPM

## 2023-07-29 DIAGNOSIS — A56.09 CHLAMYDIA VAGINITIS/CERVICITIS: ICD-10-CM

## 2023-07-29 DIAGNOSIS — R30.0 DYSURIA: ICD-10-CM

## 2023-07-29 DIAGNOSIS — A56.02 CHLAMYDIA VAGINITIS/CERVICITIS: ICD-10-CM

## 2023-07-29 LAB
APPEARANCE UR: NORMAL
BILIRUB UR STRIP-MCNC: NEGATIVE MG/DL
CANDIDA DNA VAG QL PROBE+SIG AMP: NEGATIVE
COLOR UR AUTO: NORMAL
G VAGINALIS DNA VAG QL PROBE+SIG AMP: NEGATIVE
GLUCOSE UR STRIP.AUTO-MCNC: NEGATIVE MG/DL
KETONES UR STRIP.AUTO-MCNC: NEGATIVE MG/DL
LEUKOCYTE ESTERASE UR QL STRIP.AUTO: NORMAL
NITRITE UR QL STRIP.AUTO: NEGATIVE
PH UR STRIP.AUTO: 6.5 [PH] (ref 5–8)
PROT UR QL STRIP: NEGATIVE MG/DL
RBC UR QL AUTO: NEGATIVE
SP GR UR STRIP.AUTO: 1.02
T VAGINALIS DNA VAG QL PROBE+SIG AMP: NEGATIVE
UROBILINOGEN UR STRIP-MCNC: NORMAL MG/DL

## 2023-07-29 PROCEDURE — 87491 CHLMYD TRACH DNA AMP PROBE: CPT

## 2023-07-29 PROCEDURE — 87660 TRICHOMONAS VAGIN DIR PROBE: CPT

## 2023-07-29 PROCEDURE — 87591 N.GONORRHOEAE DNA AMP PROB: CPT

## 2023-07-29 PROCEDURE — 99213 OFFICE O/P EST LOW 20 MIN: CPT | Performed by: FAMILY MEDICINE

## 2023-07-29 PROCEDURE — 3078F DIAST BP <80 MM HG: CPT | Performed by: FAMILY MEDICINE

## 2023-07-29 PROCEDURE — 81002 URINALYSIS NONAUTO W/O SCOPE: CPT | Performed by: FAMILY MEDICINE

## 2023-07-29 PROCEDURE — 87086 URINE CULTURE/COLONY COUNT: CPT

## 2023-07-29 PROCEDURE — 87480 CANDIDA DNA DIR PROBE: CPT

## 2023-07-29 PROCEDURE — 87510 GARDNER VAG DNA DIR PROBE: CPT

## 2023-07-29 PROCEDURE — 3074F SYST BP LT 130 MM HG: CPT | Performed by: FAMILY MEDICINE

## 2023-07-29 RX ORDER — NITROFURANTOIN 25; 75 MG/1; MG/1
100 CAPSULE ORAL 2 TIMES DAILY
Qty: 10 CAPSULE | Refills: 0 | Status: SHIPPED | OUTPATIENT
Start: 2023-07-29 | End: 2023-08-03

## 2023-07-29 ASSESSMENT — FIBROSIS 4 INDEX: FIB4 SCORE: 0.66

## 2023-07-29 NOTE — PROGRESS NOTES
"Subjective     Juana Infante is a 27 y.o. female who presents with UTI (Burning while urinating and after X 2 weeks )      - This is a pleasant and nontoxic appearing 27 y.o. person who has come to the walk-in clinic today for:    #1) dysuria x 2 weeks. No nvfc      ALLERGIES:  Patient has no known allergies.     PMH:  Past Medical History:   Diagnosis Date    Anxiety 2013    Depression 2013    Migraine         PSH:  Past Surgical History:   Procedure Laterality Date    GIANFRANCO BY LAPAROSCOPY N/A 10/13/2020    Procedure: CHOLECYSTECTOMY, LAPAROSCOPIC;  Surgeon: Spencer Nur D.O.;  Location: SURGERY [facility];  Service: General    TONSILLECTOMY         MEDS:    Current Outpatient Medications:     nitrofurantoin (MACROBID) 100 MG Cap, Take 1 Capsule by mouth 2 times a day for 5 days., Disp: 10 Capsule, Rfl: 0    levothyroxine (SYNTHROID) 50 MCG Tab, TAKE 1 TABLET BY MOUTH EVERY DAY IN THE MORNING ON AN EMPTY STOMACH, Disp: 90 Tablet, Rfl: 1    ** I have documented what I find to be significant in regards to past medical, social, family and surgical history  in my HPI or under PMH/PSH/FH review section, otherwise it is noncontributory **         HPI    Review of Systems   Genitourinary:  Positive for dysuria.   All other systems reviewed and are negative.             Objective     /78 (BP Location: Right arm, Patient Position: Sitting, BP Cuff Size: Adult)   Pulse 71   Temp 36.9 °C (98.5 °F) (Temporal)   Resp 16   Ht 1.6 m (5' 3\")   Wt 85.4 kg (188 lb 3.2 oz)   SpO2 98%   BMI 33.34 kg/m²      Physical Exam  Vitals and nursing note reviewed.   Constitutional:       General: She is not in acute distress.     Appearance: Normal appearance. She is well-developed.   HENT:      Head: Normocephalic.   Pulmonary:      Effort: Pulmonary effort is normal. No respiratory distress.   Abdominal:      Palpations: Abdomen is soft.      Tenderness: There is no abdominal tenderness. There is no guarding. "   Neurological:      Mental Status: She is alert.      Motor: No abnormal muscle tone.   Psychiatric:         Mood and Affect: Mood normal.         Behavior: Behavior normal.           Assessment & Plan     1. Dysuria  POCT Urinalysis    Chlamydia/GC, PCR (Urine)    VAGINAL PATHOGENS DNA PANEL    Chlamydia/GC, PCR (Genital/Anal swab)    URINE CULTURE(NEW)    nitrofurantoin (MACROBID) 100 MG Cap          - Dx, plan & d/c instructions discussed   - Rest, stay hydrated, OTC Motrin and/or Tylenol as needed      Follow up with your regular primary care providers office for a recheck on today's visit. ER if not improving in 2-3 days or if feeling/getting worse.     Patient left in stable condition     POCT results reviewed/discussed        Pertinent prior office visit notes in Epic have been reviewed by me today on day of this visit.

## 2023-07-30 LAB
C TRACH DNA SPEC QL NAA+PROBE: POSITIVE
N GONORRHOEA DNA SPEC QL NAA+PROBE: NEGATIVE
SPECIMEN SOURCE: ABNORMAL

## 2023-07-31 ENCOUNTER — TELEPHONE (OUTPATIENT)
Dept: URGENT CARE | Facility: CLINIC | Age: 28
End: 2023-07-31
Payer: COMMERCIAL

## 2023-07-31 LAB
BACTERIA UR CULT: NORMAL
SIGNIFICANT IND 70042: NORMAL
SITE SITE: NORMAL
SOURCE SOURCE: NORMAL

## 2023-07-31 RX ORDER — AZITHROMYCIN 500 MG/1
1000 TABLET, FILM COATED ORAL DAILY
Qty: 2 TABLET | Refills: 0 | Status: SHIPPED | OUTPATIENT
Start: 2023-07-31 | End: 2023-08-01

## 2023-07-31 NOTE — TELEPHONE ENCOUNTER
Kindly call (DOCUMENT CALL OR ATTEMPTED CALL IN EPIC) and let patient know:      Swabs are back and shows you have STD Chlamydia. Antibiotic to treat this was sent to your pharmacy. No sex x 2 weeks. Notify your partner(s) so they can get tested/treated as well

## 2023-08-01 ENCOUNTER — HOSPITAL ENCOUNTER (OUTPATIENT)
Dept: LAB | Facility: MEDICAL CENTER | Age: 28
End: 2023-08-01
Attending: REGISTERED NURSE
Payer: COMMERCIAL

## 2023-08-01 LAB
HBV SURFACE AG SER QL: NORMAL
HCV AB SER QL: NORMAL
HIV 1+2 AB+HIV1 P24 AG SERPL QL IA: NORMAL
T PALLIDUM AB SER QL IA: NORMAL

## 2023-08-01 PROCEDURE — 87389 HIV-1 AG W/HIV-1&-2 AB AG IA: CPT

## 2023-08-01 PROCEDURE — 36415 COLL VENOUS BLD VENIPUNCTURE: CPT

## 2023-08-01 PROCEDURE — 86780 TREPONEMA PALLIDUM: CPT

## 2023-08-01 PROCEDURE — 87340 HEPATITIS B SURFACE AG IA: CPT

## 2023-08-01 PROCEDURE — 86803 HEPATITIS C AB TEST: CPT

## 2023-09-13 ENCOUNTER — OFFICE VISIT (OUTPATIENT)
Dept: MEDICAL GROUP | Facility: PHYSICIAN GROUP | Age: 28
End: 2023-09-13
Payer: COMMERCIAL

## 2023-09-13 ENCOUNTER — HOSPITAL ENCOUNTER (OUTPATIENT)
Facility: MEDICAL CENTER | Age: 28
End: 2023-09-13
Attending: FAMILY MEDICINE
Payer: COMMERCIAL

## 2023-09-13 VITALS
WEIGHT: 185.2 LBS | RESPIRATION RATE: 16 BRPM | BODY MASS INDEX: 32.82 KG/M2 | SYSTOLIC BLOOD PRESSURE: 110 MMHG | TEMPERATURE: 98.7 F | HEIGHT: 63 IN | OXYGEN SATURATION: 98 % | HEART RATE: 72 BPM | DIASTOLIC BLOOD PRESSURE: 76 MMHG

## 2023-09-13 DIAGNOSIS — A74.9 POSITIVE CHLAMYDIA PCR: ICD-10-CM

## 2023-09-13 DIAGNOSIS — R19.7 DIARRHEA, UNSPECIFIED TYPE: ICD-10-CM

## 2023-09-13 DIAGNOSIS — E55.9 VITAMIN D DEFICIENCY: ICD-10-CM

## 2023-09-13 PROCEDURE — 87591 N.GONORRHOEAE DNA AMP PROB: CPT

## 2023-09-13 PROCEDURE — 3074F SYST BP LT 130 MM HG: CPT | Performed by: FAMILY MEDICINE

## 2023-09-13 PROCEDURE — 87491 CHLMYD TRACH DNA AMP PROBE: CPT

## 2023-09-13 PROCEDURE — 3078F DIAST BP <80 MM HG: CPT | Performed by: FAMILY MEDICINE

## 2023-09-13 PROCEDURE — 99214 OFFICE O/P EST MOD 30 MIN: CPT | Performed by: FAMILY MEDICINE

## 2023-09-13 ASSESSMENT — FIBROSIS 4 INDEX: FIB4 SCORE: 0.69

## 2023-09-13 NOTE — PROGRESS NOTES
Subjective:     CC:   Chief Complaint   Patient presents with    Follow-Up       HPI:   Juana presents today to get rechecked it for chlamydia.  Patient tested positive and was treated in July for this.  Patient denies any discharge at this time.  Patient was also tested for HIV hepatitis and syphilis in August all were negative.  Patient was supposed to follow-up with me in June but forgot about her appointment.  Patient is here to discuss those test results also.  Patient states her kids have some type of virus and she had a lot of diarrhea and cramping yesterday she was unable to do the work but she is planning to go to work today she does have 4 loose stools.  Patient wanted a work note for yesterday.  I did tell patient I could write a work note today stating I saw her today for illness but patient's states that will not work and she will just go to work today.    Past Medical History:   Diagnosis Date    Anxiety 2013    Depression 2013    Migraine        Social History     Tobacco Use    Smoking status: Former     Current packs/day: 0.00     Types: Cigarettes     Quit date: 5/26/2013     Years since quitting: 10.3    Smokeless tobacco: Never   Vaping Use    Vaping Use: Never used   Substance Use Topics    Alcohol use: Yes     Comment: socially    Drug use: No     Types: Methamphetamines     Comment: Last used 5/2013       Current Outpatient Medications Ordered in Epic   Medication Sig Dispense Refill    levothyroxine (SYNTHROID) 50 MCG Tab TAKE 1 TABLET BY MOUTH EVERY DAY IN THE MORNING ON AN EMPTY STOMACH 90 Tablet 1     No current Epic-ordered facility-administered medications on file.       Allergies:  Patient has no known allergies.    Health Maintenance: Completed    ROS:  Gen: no fevers/chills, no changes in weight  Eyes: no changes in vision  ENT: no sore throat, no hearing loss, no bloody nose  Pulm: no sob, no cough  CV: no chest pain, no palpitations  GI: no nausea/vomiting, no diarrhea  : no  "dysuria  Neuro: no headaches, no numbness/tingling  Heme/Lymph: no easy bruising    Objective:     Exam:  /76 (BP Location: Right arm, Patient Position: Sitting, BP Cuff Size: Adult)   Pulse 72   Temp 37.1 °C (98.7 °F) (Temporal)   Resp 16   Ht 1.6 m (5' 3\")   Wt 84 kg (185 lb 3.2 oz)   LMP 08/31/2023   SpO2 98%   BMI 32.81 kg/m²  Body mass index is 32.81 kg/m².    Gen: Alert and oriented, No apparent distress.  Skin: Warm and dry.  No obvious lesions.  Eyes: Sclera wnl Pupils normal in size  Lungs: Normal effort, CTA bilaterally, no wheezes, rhonchi, or rales  CV: Regular rate and rhythm. No murmurs, rubs, or gallops.  ABD: Soft non-tender no organomegaly  Musculoskeletal: Normal gait. No extremity cyanosis, clubbing, or edema.  Neuro: Oriented to person, place and time  Psych: Mood is wnl         Assessment & Plan:     28 y.o. female with the following -     1. Positive Chlamydia PCR  I will check her urine and contact patient.  - Chlamydia/GC, PCR (Urine); Future    2. Diarrhea, unspecified type  I would recommend patient have a bland diet chicken noodle soup, Gatorade and avoid any greasy or dairy products for the next 3 to 4 days.  If she continues having problems I definitely need to see her back.  I did offer to write a note that I saw her today for an illness and she is feeling better today versus yesterday but patient felt this would not work for her job.    3.  Vitamin D deficiency  I did go over her lab tests that I did on this patient in June patient forgot to follow-up for her appointment.  I would recommend she start taking 2000 IUs of vitamin D3 per day we will recheck this in a couple of months       Follow-up in 2 months or as needed      Please note that this dictation was created using voice recognition software. I have made every reasonable attempt to correct obvious errors, but I expect that there are errors of grammar and possibly content that I did not discover before finalizing " the note.

## 2023-09-14 LAB
C TRACH DNA SPEC QL NAA+PROBE: NEGATIVE
N GONORRHOEA DNA SPEC QL NAA+PROBE: NEGATIVE
SPECIMEN SOURCE: NORMAL

## 2023-09-15 RX ORDER — CLINDAMYCIN PHOSPHATE 20 MG/G
1 CREAM VAGINAL NIGHTLY
Qty: 5 EACH | Refills: 0 | Status: SHIPPED | OUTPATIENT
Start: 2023-09-15 | End: 2024-02-20

## 2023-09-27 ENCOUNTER — OFFICE VISIT (OUTPATIENT)
Dept: URGENT CARE | Facility: PHYSICIAN GROUP | Age: 28
End: 2023-09-27
Payer: COMMERCIAL

## 2023-09-27 VITALS
HEIGHT: 63 IN | WEIGHT: 186 LBS | HEART RATE: 79 BPM | OXYGEN SATURATION: 98 % | BODY MASS INDEX: 32.96 KG/M2 | TEMPERATURE: 98.9 F | SYSTOLIC BLOOD PRESSURE: 96 MMHG | DIASTOLIC BLOOD PRESSURE: 74 MMHG | RESPIRATION RATE: 18 BRPM

## 2023-09-27 DIAGNOSIS — R09.81 NASAL CONGESTION: ICD-10-CM

## 2023-09-27 DIAGNOSIS — J06.9 VIRAL URI WITH COUGH: ICD-10-CM

## 2023-09-27 PROCEDURE — 99213 OFFICE O/P EST LOW 20 MIN: CPT | Performed by: PHYSICIAN ASSISTANT

## 2023-09-27 PROCEDURE — 3078F DIAST BP <80 MM HG: CPT | Performed by: PHYSICIAN ASSISTANT

## 2023-09-27 PROCEDURE — 0241U POCT CEPHEID COV-2, FLU A/B, RSV - PCR: CPT | Performed by: PHYSICIAN ASSISTANT

## 2023-09-27 PROCEDURE — 3074F SYST BP LT 130 MM HG: CPT | Performed by: PHYSICIAN ASSISTANT

## 2023-09-27 ASSESSMENT — ENCOUNTER SYMPTOMS
SHORTNESS OF BREATH: 0
SORE THROAT: 1
COUGH: 1
ABDOMINAL PAIN: 0
MYALGIAS: 1
FEVER: 0
WHEEZING: 0
NAUSEA: 0
DIARRHEA: 0
VOMITING: 0
CHILLS: 1

## 2023-09-27 ASSESSMENT — FIBROSIS 4 INDEX: FIB4 SCORE: 0.69

## 2023-09-27 NOTE — LETTER
[facility] [place]  [address] [id number]     September 27, 2023    Patient: Juana Infante   YOB: 1995   Date of Visit: 9/27/2023       To Whom It May Concern:    Juana Infante was seen and treated in our department on 9/27/2023.  She should be excused from missed work for tonight and tomorrow night.    Sincerely,     Raúl Miner P.A.-C.

## 2023-09-28 NOTE — PROGRESS NOTES
"Subjective:   Juana Infante  is a 28 y.o. female who presents for Coronavirus Screening (Headache/Pharyngitis/Body aches/Runny nose)      HPI    Patient presents urgent care noting yesterday and today with headaches body aches and chills.  She notes mild cough but more noticeable nasal congestion and irritation to throat without significant pain.  She denies ear pain but notes some discomfort to lymph nodes on right side.  Denies vomiting abdominal pain diarrhea or rash.  Has had some mild nausea.  She denies history of asthma or pneumonia.  She has had COVID in the past.  Patient's tried treatment with OTC multisymptom medications.    Review of Systems   Constitutional:  Positive for chills. Negative for fever.   HENT:  Positive for congestion and sore throat. Negative for ear pain.    Respiratory:  Positive for cough (minimal). Negative for shortness of breath and wheezing.    Gastrointestinal:  Negative for abdominal pain, diarrhea, nausea and vomiting.   Musculoskeletal:  Positive for myalgias.   Skin:  Negative for rash.       No Known Allergies     Objective:   BP 96/74   Pulse 79   Temp 37.2 °C (98.9 °F) (Temporal)   Resp 18   Ht 1.6 m (5' 3\")   Wt 84.4 kg (186 lb)   LMP 08/31/2023   SpO2 98%   BMI 32.95 kg/m²     Physical Exam  Vitals and nursing note reviewed.   Constitutional:       General: She is not in acute distress.     Appearance: She is well-developed. She is not diaphoretic.   HENT:      Head: Normocephalic and atraumatic.      Right Ear: Tympanic membrane, ear canal and external ear normal.      Left Ear: Tympanic membrane, ear canal and external ear normal.      Nose: Nose normal.      Mouth/Throat:      Mouth: Mucous membranes are moist.      Pharynx: Uvula midline. Posterior oropharyngeal erythema (mild to moderate PND) present. No oropharyngeal exudate.      Tonsils: No tonsillar abscesses.   Eyes:      General: Lids are normal. No scleral icterus.        Right eye: No discharge.    " Diagnosis: Metastatic Adenocarcinoma  Regimen: Pem/Pem  Cycle/Day: C8D1  Is this a C1D1 appt?  No    SALONI Desai is supervising clinician today.    ECOG:   ECOG [08/20/21 0932]   ECOG Performance Status 2     Nursing Assessment:   A focused nursing assessment addressing the toxicity of chemotherapy was performed and the patient reports the following:  Nausea: NO  Vomiting: NO  Fever: NO  Chills: NO  Other signs of infection: NO  Bleeding: NO  Mucositis: NO  Diarrhea: NO  Constipation: NO  Anorexia: NO  Dysuria: NO  Urinary Bleeding: NO  Cough: NO  Shortness of Breath: NO  Fatigue/Weakness: YES, mild fatigue  Numbness/Tingling: NO  Other Neuropathies: NO  Edema: NO  Rash: NO  Hand/Foot Syndrome: NO  Anxiety/Depression/Insomnia: NO  Pain: NO     Eyes have been watery, started taking Claritin which has been helping. Patient does report that she has an eye appt scheduled for 9/7/21.          Left eye: No discharge.      Conjunctiva/sclera: Conjunctivae normal.   Pulmonary:      Effort: Pulmonary effort is normal. No respiratory distress.      Breath sounds: Normal breath sounds. No stridor. No decreased breath sounds, wheezing, rhonchi or rales.   Musculoskeletal:         General: Normal range of motion.      Cervical back: Neck supple.   Skin:     General: Skin is warm and dry.      Coloration: Skin is not pale.      Findings: No erythema.   Neurological:      Mental Status: She is alert and oriented to person, place, and time. She is not disoriented.   Psychiatric:         Speech: Speech normal.         Behavior: Behavior normal.     Point-of-care test for COVID, RSV and influenza are all NEGATIVE    Assessment/Plan:   1. Viral URI with cough  - POCT CoV-2, Flu A/B, RSV by PCR    2. Nasal congestion  - POCT CoV-2, Flu A/B, RSV by PCR  Supportive care is reviewed with patient/caregiver - recommend to push PO fluids and electrolytes, over-the-counter treatment medications reviewed  Return to clinic with lack of resolution or progression of symptoms.    I have worn an N95 mask, gloves and eye protection for the entire encounter with this patient.     Differential diagnosis, natural history, supportive care, and indications for immediate follow-up discussed.

## 2023-10-20 RX ORDER — AZITHROMYCIN 500 MG/1
TABLET, FILM COATED ORAL
Qty: 2 TABLET | Refills: 0 | Status: SHIPPED | OUTPATIENT
Start: 2023-10-20 | End: 2024-02-20

## 2023-10-26 RX ORDER — LEVOTHYROXINE SODIUM 0.05 MG/1
TABLET ORAL
Qty: 90 TABLET | Refills: 1 | Status: SHIPPED | OUTPATIENT
Start: 2023-10-26

## 2023-11-17 ENCOUNTER — APPOINTMENT (OUTPATIENT)
Dept: MEDICAL GROUP | Facility: PHYSICIAN GROUP | Age: 28
End: 2023-11-17
Payer: COMMERCIAL

## 2023-12-03 NOTE — ED NOTES
Chief Complaint   Patient presents with   • Headache     x1 hr while at work.  Pt reports frequent headaches since baby was born on March 23.    • Blurred Vision     Left eye.    Pt to triage in NAD.  Pt educated on triage process and instructed to notify triage RN of any change in status.             No

## 2023-12-06 ENCOUNTER — HOSPITAL ENCOUNTER (OUTPATIENT)
Dept: LAB | Facility: MEDICAL CENTER | Age: 28
End: 2023-12-06
Attending: FAMILY MEDICINE
Payer: COMMERCIAL

## 2023-12-06 DIAGNOSIS — E55.9 VITAMIN D DEFICIENCY: ICD-10-CM

## 2023-12-06 LAB — 25(OH)D3 SERPL-MCNC: 20 NG/ML (ref 30–100)

## 2023-12-06 PROCEDURE — 36415 COLL VENOUS BLD VENIPUNCTURE: CPT

## 2023-12-06 PROCEDURE — 82306 VITAMIN D 25 HYDROXY: CPT

## 2023-12-08 ENCOUNTER — HOSPITAL ENCOUNTER (OUTPATIENT)
Dept: LAB | Facility: MEDICAL CENTER | Age: 28
End: 2023-12-08
Attending: FAMILY MEDICINE
Payer: COMMERCIAL

## 2023-12-08 ENCOUNTER — OFFICE VISIT (OUTPATIENT)
Dept: MEDICAL GROUP | Facility: PHYSICIAN GROUP | Age: 28
End: 2023-12-08
Payer: COMMERCIAL

## 2023-12-08 VITALS
BODY MASS INDEX: 35.44 KG/M2 | SYSTOLIC BLOOD PRESSURE: 106 MMHG | HEIGHT: 63 IN | HEART RATE: 81 BPM | WEIGHT: 200 LBS | RESPIRATION RATE: 18 BRPM | OXYGEN SATURATION: 96 % | DIASTOLIC BLOOD PRESSURE: 64 MMHG | TEMPERATURE: 98.7 F

## 2023-12-08 DIAGNOSIS — E55.9 VITAMIN D DEFICIENCY: ICD-10-CM

## 2023-12-08 DIAGNOSIS — E03.8 OTHER SPECIFIED HYPOTHYROIDISM: ICD-10-CM

## 2023-12-08 DIAGNOSIS — R63.5 WEIGHT GAIN: ICD-10-CM

## 2023-12-08 LAB — TSH SERPL DL<=0.005 MIU/L-ACNC: 2.76 UIU/ML (ref 0.38–5.33)

## 2023-12-08 PROCEDURE — 3078F DIAST BP <80 MM HG: CPT | Performed by: FAMILY MEDICINE

## 2023-12-08 PROCEDURE — 99213 OFFICE O/P EST LOW 20 MIN: CPT | Performed by: FAMILY MEDICINE

## 2023-12-08 PROCEDURE — 36415 COLL VENOUS BLD VENIPUNCTURE: CPT

## 2023-12-08 PROCEDURE — 84443 ASSAY THYROID STIM HORMONE: CPT

## 2023-12-08 PROCEDURE — 3074F SYST BP LT 130 MM HG: CPT | Performed by: FAMILY MEDICINE

## 2023-12-08 ASSESSMENT — FIBROSIS 4 INDEX: FIB4 SCORE: 0.69

## 2023-12-08 NOTE — PROGRESS NOTES
Subjective:     CC:   Chief Complaint   Patient presents with    Follow-Up       HPI:   Juana presents today for follow-up.  Patient did have a vitamin D level done and that is one of the reasons why I wanted see her back.  Patient also states that she is concerned because she is gaining weight she feels like she is eating good and exercising.  Patient does have thyroid issues and I would first recommend we go ahead and recheck her thyroid test last 1 was in June that was normal.  Patient does go to a clinic that injects her lips to get them more plump she says they could possibly prescribe the phentermine and that is what she is looking for.  I did spend time discussing with her that if I prescribe the phentermine she would have to sign a controlled substance contract and I would have to see her every month because I need to watch to make sure her blood pressures doing well.  But first I would recommend checking her thyroid if her thyroid is off and I need to increase the dose that may be causing some of her weight issues.    Past Medical History:   Diagnosis Date    Anxiety 2013    Depression 2013    Migraine        Social History     Tobacco Use    Smoking status: Former     Current packs/day: 0.00     Types: Cigarettes     Quit date: 5/26/2013     Years since quitting: 10.5    Smokeless tobacco: Never   Vaping Use    Vaping Use: Never used   Substance Use Topics    Alcohol use: Yes     Comment: socially    Drug use: No     Types: Methamphetamines     Comment: Last used 5/2013       Current Outpatient Medications Ordered in Epic   Medication Sig Dispense Refill    clindamycin (CLEOCIN) 2 % vaginal cream Insert 1 Applicator into the vagina every evening. For 5 nights (Patient not taking: Reported on 9/27/2023) 5 Each 0    levothyroxine (SYNTHROID) 50 MCG Tab TAKE 1 TABLET BY MOUTH EVERY DAY IN THE MORNING ON AN EMPTY STOMACH 90 Tablet 1    azithromycin (ZITHROMAX) 500 MG tablet 2 tablets p.o. today 2 Tablet 0  "    No current [facility]-ordered facility-administered medications on file.       Allergies:  Patient has no known allergies.    Health Maintenance: Completed    ROS:  Gen: no fevers/chills, patient has gained 14 pounds in 3 months  Eyes: no changes in vision  ENT: no sore throat, no hearing loss, no bloody nose  Pulm: no sob, no cough  CV: no chest pain, no palpitations  GI: no nausea/vomiting, no diarrhea  : no dysuria  Neuro: no headaches, no numbness/tingling  Heme/Lymph: no easy bruising    Objective:     Exam:  /64 (BP Location: Left arm, Patient Position: Sitting, BP Cuff Size: Large adult)   Pulse 81   Temp 37.1 °C (98.7 °F) (Temporal)   Resp 18   Ht 1.6 m (5' 3\")   Wt 90.7 kg (200 lb)   LMP 11/22/2023   SpO2 96%   BMI 35.43 kg/m²  Body mass index is 35.43 kg/m².    Gen: Alert and oriented, No apparent distress.  Skin: Warm and dry.  No obvious lesions.  Eyes: Sclera wnl Pupils normal in size  Lungs: Normal effort, CTA bilaterally, no wheezes, rhonchi, or rales  CV: Regular rate and rhythm. No murmurs, rubs, or gallops.  Musculoskeletal: Normal gait. No extremity cyanosis, clubbing, or edema.  Neuro: Oriented to person, place and time  Psych: Mood is wnl     Assessment & Plan:     28 y.o. female with the following -     1. Other specified hypothyroidism  Since she is gaining more weight and she states that she is watching what she is eating and exercising I would recommend checking her thyroid to ensure her thyroid medication is in good range for her to help her be euthyroid.  - TSH WITH REFLEX TO FT4; Future    2. Vitamin D deficiency  Patient's vitamin D is low patient admits that she is not taking her vitamin D 3 daily encouraged her to start doing this.    3. Weight gain  I can give patient appointment to see me back in the next couple weeks at that time we can go through the blood test for her thyroid if it is normal and patient is still wanting to be on phentermine I can prescribe it with " the understanding she sighs a controlled substance contract and I need to see her monthly.  I emphasized the patient that this is temporary at the most may be 3 months if she is losing weight.       Return in about 2 weeks (around 12/22/2023).    Please note that this dictation was created using voice recognition software. I have made every reasonable attempt to correct obvious errors, but I expect that there are errors of grammar and possibly content that I did not discover before finalizing the note.

## 2023-12-12 ENCOUNTER — OFFICE VISIT (OUTPATIENT)
Dept: MEDICAL GROUP | Facility: PHYSICIAN GROUP | Age: 28
End: 2023-12-12
Payer: COMMERCIAL

## 2023-12-12 VITALS
WEIGHT: 198.2 LBS | RESPIRATION RATE: 16 BRPM | HEIGHT: 63 IN | DIASTOLIC BLOOD PRESSURE: 64 MMHG | TEMPERATURE: 98.5 F | SYSTOLIC BLOOD PRESSURE: 104 MMHG | BODY MASS INDEX: 35.12 KG/M2 | HEART RATE: 67 BPM | OXYGEN SATURATION: 97 %

## 2023-12-12 DIAGNOSIS — R63.4 WEIGHT LOSS: ICD-10-CM

## 2023-12-12 DIAGNOSIS — R63.5 WEIGHT GAIN: ICD-10-CM

## 2023-12-12 PROCEDURE — 3078F DIAST BP <80 MM HG: CPT | Performed by: FAMILY MEDICINE

## 2023-12-12 PROCEDURE — 3074F SYST BP LT 130 MM HG: CPT | Performed by: FAMILY MEDICINE

## 2023-12-12 PROCEDURE — 99214 OFFICE O/P EST MOD 30 MIN: CPT | Performed by: FAMILY MEDICINE

## 2023-12-12 RX ORDER — PHENTERMINE HYDROCHLORIDE 15 MG/1
15 CAPSULE ORAL EVERY MORNING
Qty: 30 CAPSULE | Refills: 0 | Status: SHIPPED | OUTPATIENT
Start: 2023-12-12 | End: 2024-01-11

## 2023-12-12 ASSESSMENT — FIBROSIS 4 INDEX: FIB4 SCORE: 0.69

## 2023-12-12 NOTE — PROGRESS NOTES
Subjective:     CC:   Chief Complaint   Patient presents with    Follow-Up       HPI:   Juana presents today for follow-up of her thyroid test.  It is normal patient would like to be on a very low-dose diet pill I explained to her it is a controlled substance I need to see her once a month with expectation that she will not be able to use it for longer than 3 months.  Patient expressed understanding of this.    Past Medical History:   Diagnosis Date    Anxiety 2013    Depression 2013    Migraine        Social History     Tobacco Use    Smoking status: Former     Current packs/day: 0.00     Types: Cigarettes     Quit date: 5/26/2013     Years since quitting: 10.5    Smokeless tobacco: Never   Vaping Use    Vaping Use: Never used   Substance Use Topics    Alcohol use: Yes     Comment: socially    Drug use: No     Types: Methamphetamines     Comment: Last used 5/2013       Current Outpatient Medications Ordered in Epic   Medication Sig Dispense Refill    phentermine 15 MG capsule Take 1 Capsule by mouth every morning for 30 days. 30 Capsule 0    clindamycin (CLEOCIN) 2 % vaginal cream Insert 1 Applicator into the vagina every evening. For 5 nights (Patient not taking: Reported on 9/27/2023) 5 Each 0    levothyroxine (SYNTHROID) 50 MCG Tab TAKE 1 TABLET BY MOUTH EVERY DAY IN THE MORNING ON AN EMPTY STOMACH 90 Tablet 1    azithromycin (ZITHROMAX) 500 MG tablet 2 tablets p.o. today 2 Tablet 0     No current Epic-ordered facility-administered medications on file.       Allergies:  Patient has no known allergies.    Health Maintenance: Completed    ROS:  Gen: no fevers/chills, no changes in weight  Eyes: no changes in vision  ENT: no sore throat, no hearing loss, no bloody nose  Pulm: no sob, no cough  CV: no chest pain, no palpitations  GI: no nausea/vomiting, no diarrhea  : no dysuria  Neuro: no headaches, no numbness/tingling  Heme/Lymph: no easy bruising    Objective:     Exam:  /64 (BP Location: Right arm,  "Patient Position: Sitting, BP Cuff Size: Adult)   Pulse 67   Temp 36.9 °C (98.5 °F) (Temporal)   Resp 16   Ht 1.6 m (5' 3\")   Wt 89.9 kg (198 lb 3.2 oz)   LMP 11/22/2023   SpO2 97%   BMI 35.11 kg/m²  Body mass index is 35.11 kg/m².    Gen: Alert and oriented, No apparent distress.  Skin: Warm and dry.  No obvious lesions.  Eyes: Sclera wnl Pupils normal in size  Lungs: Normal effort, CTA bilaterally, no wheezes, rhonchi, or rales  CV: Regular rate and rhythm. No murmurs, rubs, or gallops.  Musculoskeletal: Normal gait. No extremity cyanosis, clubbing, or edema.  Neuro: Oriented to person, place and time  Psych: Mood is wnl       Assessment & Plan:     28 y.o. female with the following -     1. Weight gain  Patient did sign a controlled substance contract and had no questions.  I told her I need to see her in 1 months patient should avoid losing more than 10 pounds in a month due to fact it can cause serious medical issues with the pancreas and gallbladder.  Patient expressed understanding of this.  - Controlled Substance Treatment Agreement  - phentermine 15 MG capsule; Take 1 Capsule by mouth every morning for 30 days.  Dispense: 30 Capsule; Refill: 0       Return in about 4 weeks (around 1/9/2024), or if symptoms worsen or fail to improve.    Please note that this dictation was created using voice recognition software. I have made every reasonable attempt to correct obvious errors, but I expect that there are errors of grammar and possibly content that I did not discover before finalizing the note.  "

## 2023-12-28 ENCOUNTER — APPOINTMENT (OUTPATIENT)
Dept: MEDICAL GROUP | Facility: PHYSICIAN GROUP | Age: 28
End: 2023-12-28
Payer: COMMERCIAL

## 2024-01-12 ENCOUNTER — APPOINTMENT (OUTPATIENT)
Dept: MEDICAL GROUP | Facility: PHYSICIAN GROUP | Age: 29
End: 2024-01-12
Payer: COMMERCIAL

## 2024-01-16 ENCOUNTER — APPOINTMENT (OUTPATIENT)
Dept: MEDICAL GROUP | Facility: PHYSICIAN GROUP | Age: 29
End: 2024-01-16
Payer: COMMERCIAL

## 2024-01-19 ENCOUNTER — OFFICE VISIT (OUTPATIENT)
Dept: MEDICAL GROUP | Facility: PHYSICIAN GROUP | Age: 29
End: 2024-01-19
Payer: COMMERCIAL

## 2024-01-19 VITALS
HEART RATE: 82 BPM | SYSTOLIC BLOOD PRESSURE: 108 MMHG | OXYGEN SATURATION: 95 % | HEIGHT: 63 IN | RESPIRATION RATE: 16 BRPM | DIASTOLIC BLOOD PRESSURE: 60 MMHG | TEMPERATURE: 98.4 F | BODY MASS INDEX: 34.59 KG/M2 | WEIGHT: 195.2 LBS

## 2024-01-19 DIAGNOSIS — R63.5 WEIGHT GAIN: ICD-10-CM

## 2024-01-19 PROCEDURE — 3078F DIAST BP <80 MM HG: CPT | Performed by: FAMILY MEDICINE

## 2024-01-19 PROCEDURE — 3074F SYST BP LT 130 MM HG: CPT | Performed by: FAMILY MEDICINE

## 2024-01-19 PROCEDURE — 99214 OFFICE O/P EST MOD 30 MIN: CPT | Performed by: FAMILY MEDICINE

## 2024-01-19 RX ORDER — PHENTERMINE HYDROCHLORIDE 30 MG/1
30 CAPSULE ORAL EVERY MORNING
Qty: 30 CAPSULE | Refills: 0 | Status: SHIPPED | OUTPATIENT
Start: 2024-01-19 | End: 2024-02-18

## 2024-01-19 ASSESSMENT — FIBROSIS 4 INDEX: FIB4 SCORE: 0.69

## 2024-01-19 ASSESSMENT — PATIENT HEALTH QUESTIONNAIRE - PHQ9: CLINICAL INTERPRETATION OF PHQ2 SCORE: 0

## 2024-01-19 NOTE — PROGRESS NOTES
Subjective:     CC:   Chief Complaint   Patient presents with    Follow-Up       HPI:   Juana presents today for follow-up.  Patient did run out of her medications about 3 to 4 days ago.  Patient is still having cravings she is on a low-dose we will go ahead and increase it to 30 mg.    Past Medical History:   Diagnosis Date    Anxiety 2013    Depression 2013    Migraine        Social History     Tobacco Use    Smoking status: Former     Current packs/day: 0.00     Types: Cigarettes     Quit date: 5/26/2013     Years since quitting: 10.6    Smokeless tobacco: Never   Vaping Use    Vaping Use: Never used   Substance Use Topics    Alcohol use: Yes     Comment: socially    Drug use: No     Types: Methamphetamines     Comment: Last used 5/2013       Current Outpatient Medications Ordered in Epic   Medication Sig Dispense Refill    phentermine 30 MG capsule Take 1 Capsule by mouth every morning for 30 days. 30 Capsule 0    clindamycin (CLEOCIN) 2 % vaginal cream Insert 1 Applicator into the vagina every evening. For 5 nights (Patient not taking: Reported on 9/27/2023) 5 Each 0    levothyroxine (SYNTHROID) 50 MCG Tab TAKE 1 TABLET BY MOUTH EVERY DAY IN THE MORNING ON AN EMPTY STOMACH 90 Tablet 1    azithromycin (ZITHROMAX) 500 MG tablet 2 tablets p.o. today 2 Tablet 0     No current Epic-ordered facility-administered medications on file.       Allergies:  Patient has no known allergies.    Health Maintenance: Completed    ROS:  Gen: no fevers/chills, patient has lost 5 pounds in the month  Eyes: no changes in vision  ENT: no sore throat, no hearing loss, no bloody nose  Pulm: no sob, no cough  CV: no chest pain, no palpitations  GI: no nausea/vomiting, no diarrhea  : no dysuria  Neuro: no headaches, no numbness/tingling  Heme/Lymph: no easy bruising    Objective:     Exam:  /60 (BP Location: Right arm, Patient Position: Sitting, BP Cuff Size: Adult)   Pulse 82   Temp 36.9 °C (98.4 °F) (Temporal)   Resp 16   " Ht 1.6 m (5' 3\")   Wt 88.5 kg (195 lb 3.2 oz)   LMP 01/11/2024   SpO2 95%   BMI 34.58 kg/m²  Body mass index is 34.58 kg/m².    Gen: Alert and oriented, No apparent distress.  Skin: Warm and dry.  No obvious lesions.  Eyes: Sclera wnl Pupils normal in size  Lungs: Normal effort, CTA bilaterally, no wheezes, rhonchi, or rales  CV: Regular rate and rhythm. No murmurs, rubs, or gallops.  Musculoskeletal: Normal gait. No extremity cyanosis, clubbing, or edema.  Neuro: Oriented to person, place and time  Psych: Mood is wnl         Assessment & Plan:     28 y.o. female with the following -     1. Weight gain  Patient is trying to decrease what she is eating increase activity.  Will see her back in a month for follow-up.       Return in about 4 weeks (around 2/16/2024), or if symptoms worsen or fail to improve.    Please note that this dictation was created using voice recognition software. I have made every reasonable attempt to correct obvious errors, but I expect that there are errors of grammar and possibly content that I did not discover before finalizing the note.  "

## 2024-02-15 ENCOUNTER — APPOINTMENT (OUTPATIENT)
Dept: MEDICAL GROUP | Facility: PHYSICIAN GROUP | Age: 29
End: 2024-02-15
Payer: COMMERCIAL

## 2024-02-20 ENCOUNTER — OFFICE VISIT (OUTPATIENT)
Dept: MEDICAL GROUP | Facility: PHYSICIAN GROUP | Age: 29
End: 2024-02-20
Payer: COMMERCIAL

## 2024-02-20 VITALS
TEMPERATURE: 97.8 F | BODY MASS INDEX: 34.23 KG/M2 | HEART RATE: 78 BPM | HEIGHT: 63 IN | DIASTOLIC BLOOD PRESSURE: 64 MMHG | SYSTOLIC BLOOD PRESSURE: 96 MMHG | OXYGEN SATURATION: 97 % | WEIGHT: 193.2 LBS | RESPIRATION RATE: 18 BRPM

## 2024-02-20 DIAGNOSIS — R63.5 WEIGHT GAIN: ICD-10-CM

## 2024-02-20 PROCEDURE — 99214 OFFICE O/P EST MOD 30 MIN: CPT | Performed by: FAMILY MEDICINE

## 2024-02-20 PROCEDURE — 3074F SYST BP LT 130 MM HG: CPT | Performed by: FAMILY MEDICINE

## 2024-02-20 PROCEDURE — 3078F DIAST BP <80 MM HG: CPT | Performed by: FAMILY MEDICINE

## 2024-02-20 RX ORDER — PHENTERMINE HYDROCHLORIDE 30 MG/1
30 CAPSULE ORAL EVERY MORNING
Qty: 30 CAPSULE | Refills: 0 | Status: SHIPPED | OUTPATIENT
Start: 2024-02-20 | End: 2024-03-21

## 2024-02-20 ASSESSMENT — FIBROSIS 4 INDEX: FIB4 SCORE: 0.69

## 2024-02-20 NOTE — PROGRESS NOTES
"Subjective:     CC:   Chief Complaint   Patient presents with    Follow-Up       HPI:   Juana presents today for follow-up on diet pills.  Patient has lost 2 pounds in 1 month.  Patient is agreeable to see a nutritional list will write a referral for her.  Patient states that she will be getting some cosmetic surgery done next month in [place] request patient sent the information to me if she needs a preop from me.  More than likely I will  need to see her back for this    Past Medical History:   Diagnosis Date    Anxiety 2013    Depression 2013    Migraine        Social History     Tobacco Use    Smoking status: Former     Current packs/day: 0.00     Types: Cigarettes     Quit date: 5/26/2013     Years since quitting: 10.7    Smokeless tobacco: Never   Vaping Use    Vaping Use: Never used   Substance Use Topics    Alcohol use: Yes     Comment: socially    Drug use: No     Types: Methamphetamines     Comment: Last used 5/2013       Current Outpatient Medications Ordered in Epic   Medication Sig Dispense Refill    levothyroxine (SYNTHROID) 50 MCG Tab TAKE 1 TABLET BY MOUTH EVERY DAY IN THE MORNING ON AN EMPTY STOMACH 90 Tablet 1    phentermine 30 MG capsule Take 1 Capsule by mouth every morning for 30 days. 30 Capsule 0     No current Epic-ordered facility-administered medications on file.       Allergies:  Patient has no known allergies.    Health Maintenance: Completed    ROS:  Gen: no fevers/chills, has lost 2 pounds in 1 month  Eyes: no changes in vision  ENT: no sore throat, no hearing loss, no bloody nose  Pulm: no sob, no cough  CV: no chest pain, no palpitations  GI: no nausea/vomiting, no diarrhea  : no dysuria  Neuro: no headaches, no numbness/tingling  Heme/Lymph: no easy bruising    Objective:     Exam:  BP 96/64 (BP Location: Right arm, Patient Position: Sitting, BP Cuff Size: Large adult)   Pulse 78   Temp 36.6 °C (97.8 °F) (Temporal)   Resp 18   Ht 1.6 m (5' 3\")   Wt 87.6 kg (193 lb 3.2 " oz)   LMP 02/09/2024   SpO2 97%   BMI 34.22 kg/m²  Body mass index is 34.22 kg/m².    Gen: Alert and oriented, No apparent distress.  Skin: Warm and dry.  No obvious lesions.  Eyes: Sclera wnl Pupils normal in size  Lungs: Normal effort, CTA bilaterally, no wheezes, rhonchi, or rales  CV: Regular rate and rhythm. No murmurs, rubs, or gallops.  Musculoskeletal: Normal gait. No extremity cyanosis, clubbing, or edema.  Neuro: Oriented to person, place and time  Psych: Mood is wnl       Assessment & Plan:     28 y.o. female with the following -     1. Weight gain  Will write referral to nutrition.  Also she is having surgery will probably hold off on refilling any more phentermine when I see her back.  - Referral to Nutrition Services       Return in about 4 weeks (around 3/19/2024), or if symptoms worsen or fail to improve.    Please note that this dictation was created using voice recognition software. I have made every reasonable attempt to correct obvious errors, but I expect that there are errors of grammar and possibly content that I did not discover before finalizing the note.

## 2024-02-26 ENCOUNTER — OFFICE VISIT (OUTPATIENT)
Dept: MEDICAL GROUP | Facility: PHYSICIAN GROUP | Age: 29
End: 2024-02-26
Payer: COMMERCIAL

## 2024-02-26 VITALS
HEART RATE: 78 BPM | HEIGHT: 63 IN | TEMPERATURE: 98.6 F | SYSTOLIC BLOOD PRESSURE: 100 MMHG | DIASTOLIC BLOOD PRESSURE: 66 MMHG | OXYGEN SATURATION: 97 % | RESPIRATION RATE: 16 BRPM | BODY MASS INDEX: 34.23 KG/M2 | WEIGHT: 193.2 LBS

## 2024-02-26 DIAGNOSIS — R63.5 WEIGHT GAIN: ICD-10-CM

## 2024-02-26 DIAGNOSIS — E55.9 VITAMIN D DEFICIENCY: ICD-10-CM

## 2024-02-26 DIAGNOSIS — E78.00 ELEVATED LDL CHOLESTEROL LEVEL: ICD-10-CM

## 2024-02-26 DIAGNOSIS — Z01.818 ENCOUNTER FOR PREOPERATIVE ASSESSMENT: ICD-10-CM

## 2024-02-26 DIAGNOSIS — E03.8 OTHER SPECIFIED HYPOTHYROIDISM: ICD-10-CM

## 2024-02-26 PROBLEM — G44.209 TENSION TYPE HEADACHE: Status: RESOLVED | Noted: 2021-03-24 | Resolved: 2024-02-26

## 2024-02-26 PROBLEM — R19.7 DIARRHEA: Status: RESOLVED | Noted: 2023-09-13 | Resolved: 2024-02-26

## 2024-02-26 PROBLEM — F32.A DEPRESSION: Status: RESOLVED | Noted: 2022-05-03 | Resolved: 2024-02-26

## 2024-02-26 PROBLEM — R13.10 SWALLOWING DISORDER: Status: RESOLVED | Noted: 2022-07-21 | Resolved: 2024-02-26

## 2024-02-26 PROBLEM — K29.50 CHRONIC GASTRITIS: Status: RESOLVED | Noted: 2021-03-24 | Resolved: 2024-02-26

## 2024-02-26 PROBLEM — R05.9 COUGH: Status: RESOLVED | Noted: 2021-09-13 | Resolved: 2024-02-26

## 2024-02-26 PROCEDURE — 3078F DIAST BP <80 MM HG: CPT | Performed by: FAMILY MEDICINE

## 2024-02-26 PROCEDURE — 3074F SYST BP LT 130 MM HG: CPT | Performed by: FAMILY MEDICINE

## 2024-02-26 PROCEDURE — 99213 OFFICE O/P EST LOW 20 MIN: CPT | Performed by: FAMILY MEDICINE

## 2024-02-26 ASSESSMENT — FIBROSIS 4 INDEX: FIB4 SCORE: 0.69

## 2024-02-26 NOTE — PROGRESS NOTES
Subjective:     CC:   Chief Complaint   Patient presents with    Follow-Up       HPI:   Juana presents today for preop clearance.  Patient states she is doing well and has no complaints.  Patient did get lab work done by her plastic surgeon February 16 and I am reviewing them everything is within normal limits.     Past Medical History:   Diagnosis Date    Anxiety 2013    Depression 2013    Migraine        Social History     Tobacco Use    Smoking status: Former     Current packs/day: 0.00     Types: Cigarettes     Quit date: 5/26/2013     Years since quitting: 10.7    Smokeless tobacco: Never   Vaping Use    Vaping Use: Never used   Substance Use Topics    Alcohol use: Yes     Comment: socially    Drug use: No     Types: Methamphetamines     Comment: Last used 5/2013       Current Outpatient Medications Ordered in Epic   Medication Sig Dispense Refill    phentermine 30 MG capsule Take 1 Capsule by mouth every morning for 30 days. 30 Capsule 0    levothyroxine (SYNTHROID) 50 MCG Tab TAKE 1 TABLET BY MOUTH EVERY DAY IN THE MORNING ON AN EMPTY STOMACH 90 Tablet 1     No current Epic-ordered facility-administered medications on file.     Past Surgical History:   Procedure Laterality Date    GIANFRANCO BY LAPAROSCOPY N/A 10/13/2020    Procedure: CHOLECYSTECTOMY, LAPAROSCOPIC;  Surgeon: Spencer Nur D.O.;  Location: SURGERY [facility];  Service: General    TONSILLECTOMY       History reviewed. No pertinent family history.      Allergies:  Patient has no known allergies.    Health Maintenance: Completed    ROS:  Gen: no fevers/chills, no changes in weight  Eyes: no changes in vision  ENT: no sore throat, no hearing loss, no bloody nose  Pulm: no sob, no cough, denies snoring or stop breathing  CV: no chest pain, no palpitations  GI: no nausea/vomiting, no diarrhea  : no dysuria  Neuro: no headaches, no numbness/tingling  Heme/Lymph: no easy bruising    Objective:     Exam:  /66 (BP Location: Right arm, Patient  "Position: Sitting, BP Cuff Size: Adult)   Pulse 78   Temp 37 °C (98.6 °F) (Temporal)   Resp 16   Ht 1.6 m (5' 3\")   Wt 87.6 kg (193 lb 3.2 oz)   LMP 02/09/2024   SpO2 97%   BMI 34.22 kg/m²  Body mass index is 34.22 kg/m².    Gen: Alert and oriented, No apparent distress.  Skin: Warm and dry.  No obvious lesions.  Eyes: Sclera wnl Pupils normal in size  ENT: Canals wnl and TM are not red, mouth negative redness or exudates  Lungs: Normal effort, CTA bilaterally, no wheezes, rhonchi, or rales  CV: Regular rate and rhythm. No murmurs, rubs, or gallops.  ABD: Soft non-tender no organomegaly  Musculoskeletal: Normal gait. No extremity cyanosis, clubbing, or edema.  Neuro: Oriented to person, place and time  Psych: Mood is wnl       Assessment & Plan:     28 y.o. female with the following -     1. Other specified hypothyroidism  Last TSH was done December 2023 was within normal limits patient to continue her present thyroid medication    2. Elevated LDL cholesterol level  Recommend rechecking her lipid panel this summer    3. Vitamin D deficiency  Would recommend checking her vitamin D when I recheck her lipid panel in the summer    4. Weight gain  Patient was on phentermine to lose weight.  Patient was informed by her plastic surgeon to stop it a month before her surgery which I agree with this plan.  I would recommend seeing her back in mid April for follow-up.    5. Encounter for preoperative assessment  I did review labs that were ordered on February 16 her comprehensive panel CBC, PT and PTT along with TSH are all within normal limits.  Patient is cleared for the surgical procedure       Return in about 2 months (around 4/26/2024).    Please note that this dictation was created using voice recognition software. I have made every reasonable attempt to correct obvious errors, but I expect that there are errors of grammar and possibly content that I did not discover before finalizing the note.  "

## 2024-08-09 ENCOUNTER — HOSPITAL ENCOUNTER (OUTPATIENT)
Facility: MEDICAL CENTER | Age: 29
End: 2024-08-09
Payer: COMMERCIAL

## 2024-08-09 ENCOUNTER — OFFICE VISIT (OUTPATIENT)
Dept: URGENT CARE | Facility: CLINIC | Age: 29
End: 2024-08-09
Payer: COMMERCIAL

## 2024-08-09 VITALS
DIASTOLIC BLOOD PRESSURE: 80 MMHG | BODY MASS INDEX: 34.55 KG/M2 | RESPIRATION RATE: 18 BRPM | HEIGHT: 63 IN | OXYGEN SATURATION: 98 % | WEIGHT: 195 LBS | HEART RATE: 68 BPM | TEMPERATURE: 97.6 F | SYSTOLIC BLOOD PRESSURE: 114 MMHG

## 2024-08-09 DIAGNOSIS — N89.8 VAGINAL LESION: ICD-10-CM

## 2024-08-09 DIAGNOSIS — N30.01 ACUTE CYSTITIS WITH HEMATURIA: ICD-10-CM

## 2024-08-09 DIAGNOSIS — R30.0 DYSURIA: ICD-10-CM

## 2024-08-09 DIAGNOSIS — Z11.3 SCREENING EXAMINATION FOR STI: ICD-10-CM

## 2024-08-09 LAB
APPEARANCE UR: CLEAR
BILIRUB UR STRIP-MCNC: NEGATIVE MG/DL
CANDIDA DNA VAG QL PROBE+SIG AMP: NEGATIVE
COLOR UR AUTO: YELLOW
G VAGINALIS DNA VAG QL PROBE+SIG AMP: POSITIVE
GLUCOSE UR STRIP.AUTO-MCNC: NEGATIVE MG/DL
KETONES UR STRIP.AUTO-MCNC: NEGATIVE MG/DL
LEUKOCYTE ESTERASE UR QL STRIP.AUTO: NORMAL
NITRITE UR QL STRIP.AUTO: NEGATIVE
PH UR STRIP.AUTO: 5 [PH] (ref 5–8)
POCT INT CON NEG: NEGATIVE
POCT INT CON POS: POSITIVE
POCT URINE PREGNANCY TEST: NEGATIVE
PROT UR QL STRIP: NEGATIVE MG/DL
RBC UR QL AUTO: NORMAL
SP GR UR STRIP.AUTO: 1.02
T VAGINALIS DNA VAG QL PROBE+SIG AMP: NEGATIVE
UROBILINOGEN UR STRIP-MCNC: NORMAL MG/DL

## 2024-08-09 PROCEDURE — 87798 DETECT AGENT NOS DNA AMP: CPT

## 2024-08-09 PROCEDURE — 87086 URINE CULTURE/COLONY COUNT: CPT

## 2024-08-09 PROCEDURE — 3074F SYST BP LT 130 MM HG: CPT

## 2024-08-09 PROCEDURE — 99214 OFFICE O/P EST MOD 30 MIN: CPT

## 2024-08-09 PROCEDURE — 87591 N.GONORRHOEAE DNA AMP PROB: CPT

## 2024-08-09 PROCEDURE — 87480 CANDIDA DNA DIR PROBE: CPT

## 2024-08-09 PROCEDURE — 81002 URINALYSIS NONAUTO W/O SCOPE: CPT

## 2024-08-09 PROCEDURE — 87510 GARDNER VAG DNA DIR PROBE: CPT

## 2024-08-09 PROCEDURE — 87491 CHLMYD TRACH DNA AMP PROBE: CPT

## 2024-08-09 PROCEDURE — 3079F DIAST BP 80-89 MM HG: CPT

## 2024-08-09 PROCEDURE — 87660 TRICHOMONAS VAGIN DIR PROBE: CPT

## 2024-08-09 PROCEDURE — 87529 HSV DNA AMP PROBE: CPT | Mod: 91

## 2024-08-09 PROCEDURE — 81025 URINE PREGNANCY TEST: CPT

## 2024-08-09 RX ORDER — PHENAZOPYRIDINE HYDROCHLORIDE 200 MG/1
200 TABLET, FILM COATED ORAL 3 TIMES DAILY
Qty: 6 TABLET | Refills: 0 | Status: SHIPPED | OUTPATIENT
Start: 2024-08-09 | End: 2024-08-11

## 2024-08-09 RX ORDER — NITROFURANTOIN 25; 75 MG/1; MG/1
100 CAPSULE ORAL EVERY 12 HOURS
Qty: 10 CAPSULE | Refills: 0 | Status: SHIPPED | OUTPATIENT
Start: 2024-08-09 | End: 2024-08-14

## 2024-08-09 ASSESSMENT — ENCOUNTER SYMPTOMS
DIARRHEA: 0
CHILLS: 0
FEVER: 0
ABDOMINAL PAIN: 1
FLANK PAIN: 0
CONSTIPATION: 0
VOMITING: 0
BLOOD IN STOOL: 0
NAUSEA: 0

## 2024-08-09 ASSESSMENT — FIBROSIS 4 INDEX: FIB4 SCORE: 0.69

## 2024-08-09 NOTE — PATIENT INSTRUCTIONS
How are vaginal boils treated?    Most vaginal boils can be treated at home with no medical assistance. For at-home treatment you should:    Apply a warm, moist compress (like a damp washcloth) to the area three to four times per day. This helps draw the pus to the surface and encourages the boil to drain. Use a new washcloth each time.    Never squeeze, pop or cut open the boil yourself. This can lead to more pain and spread the infection.    Wear loose fitting clothing to prevent rubbing and irritation to the area.  Take an over-the-counter pain medication for discomfort.    Keep the vaginal area clean with soap and water. Wash your hands before and after touching the infected area.    Clean the boil and cover it with a loose bandage after it begins to drain.    Urinary Tract Infection, Adult  A urinary tract infection (UTI) is an infection of any part of the urinary tract. The urinary tract includes:  The kidneys.  The ureters.  The bladder.  The urethra.  These organs make, store, and get rid of pee (urine) in the body.  What are the causes?  This infection is caused by germs (bacteria) in your genital area. These germs grow and cause swelling (inflammation) of your urinary tract.  What increases the risk?  The following factors may make you more likely to develop this condition:  Using a small, thin tube (catheter) to drain pee.  Not being able to control when you pee or poop (incontinence).  Being female. If you are female, these things can increase the risk:  Using these methods to prevent pregnancy:  A medicine that kills sperm (spermicide).  A device that blocks sperm (diaphragm).  Having low levels of a female hormone (estrogen).  Being pregnant.  You are more likely to develop this condition if:  You have genes that add to your risk.  You are sexually active.  You take antibiotic medicines.  You have trouble peeing because of:  A prostate that is bigger than normal, if you are male.  A blockage in the  part of your body that drains pee from the bladder.  A kidney stone.  A nerve condition that affects your bladder.  Not getting enough to drink.  Not peeing often enough.  You have other conditions, such as:  Diabetes.  A weak disease-fighting system (immune system).  Sickle cell disease.  Gout.  Injury of the spine.  What are the signs or symptoms?  Symptoms of this condition include:  Needing to pee right away.  Peeing small amounts often.  Pain or burning when peeing.  Blood in the pee.  Pee that smells bad or not like normal.  Trouble peeing.  Pee that is cloudy.  Fluid coming from the vagina, if you are female.  Pain in the belly or lower back.  Other symptoms include:  Vomiting.  Not feeling hungry.  Feeling mixed up (confused). This may be the first symptom in older adults.  Being tired and grouchy (irritable).  A fever.  Watery poop (diarrhea).  How is this treated?  Taking antibiotic medicine.  Taking other medicines.  Drinking enough water.  In some cases, you may need to see a specialist.  Follow these instructions at home:    Medicines  Take over-the-counter and prescription medicines only as told by your doctor.  If you were prescribed an antibiotic medicine, take it as told by your doctor. Do not stop taking it even if you start to feel better.  General instructions  Make sure you:  Pee until your bladder is empty.  Do not hold pee for a long time.  Empty your bladder after sex.  Wipe from front to back after peeing or pooping if you are a female. Use each tissue one time when you wipe.  Drink enough fluid to keep your pee pale yellow.  Keep all follow-up visits.  Contact a doctor if:  You do not get better after 1-2 days.  Your symptoms go away and then come back.  Get help right away if:  You have very bad back pain.  You have very bad pain in your lower belly.  You have a fever.  You have chills.  You feeling like you will vomit or you vomit.  Summary  A urinary tract infection (UTI) is an infection  of any part of the urinary tract.  This condition is caused by germs in your genital area.  There are many risk factors for a UTI.  Treatment includes antibiotic medicines.  Drink enough fluid to keep your pee pale yellow.  This information is not intended to replace advice given to you by your health care provider. Make sure you discuss any questions you have with your health care provider.  Document Revised: 07/30/2021 Document Reviewed: 07/30/2021  Elsevier Patient Education © 2023 [employer]

## 2024-08-09 NOTE — PROGRESS NOTES
Subjective:   Juana Infante is a very pleasant 28 y.o. female who presents for:    Chief Complaint   Patient presents with    Dysuria     Starting last night: Burning when urinating. STD test requested as well, also have a sore.        HPI:    History of Present Illness  The patient is a 28-year-old female who presents for dysuria and an STI check. The pronouns are she, her. She is otherwise healthy.    She reports experiencing a burning sensation during urination, which began last night. Accompanying symptoms include urinary frequency and hesitancy. She describes a sensation of pressure, but denies any fever or chills. She also denies any flank pain or hematuria. A week ago, she suspected a case of bacterial vaginosis and used internal boric acid for treatment. A few days ago, she noticed a large bump in her vaginal area, and yesterday, she noticed another bump, which was smaller in size. She has no history of herpes. The bumps are painful, and she has not experienced this before. No drainage.  She reports being in a monogamous relationship with a male over the past 8 months.  She has no known exposure to STIs.       ROS:    Review of Systems   Constitutional:  Negative for chills, fever and malaise/fatigue.   Gastrointestinal:  Positive for abdominal pain. Negative for blood in stool, constipation, diarrhea, melena, nausea and vomiting.   Genitourinary:  Positive for dysuria, frequency and urgency. Negative for flank pain and hematuria.        Lesions on labia   All other systems reviewed and are negative.      Medications:      Current Outpatient Medications   Medication Sig    levothyroxine (SYNTHROID) 50 MCG Tab TAKE 1 TABLET BY MOUTH EVERY DAY IN THE MORNING ON AN EMPTY STOMACH       Allergies:     No Known Allergies    Problem List:     Patient Active Problem List   Diagnosis    Other specified hypothyroidism    Elevated LDL cholesterol level    Vitamin D deficiency    Weight gain    Encounter for  preoperative assessment       Surgical History:    Past Surgical History:   Procedure Laterality Date    GIANFRANCO BY LAPAROSCOPY N/A 10/13/2020    Procedure: CHOLECYSTECTOMY, LAPAROSCOPIC;  Surgeon: Spencer Nur D.O.;  Location: SURGERY [facility];  Service: General    TONSILLECTOMY         Past Social Hx:     Social History     Socioeconomic History    Marital status:    Tobacco Use    Smoking status: Former     Current packs/day: 0.00     Types: Cigarettes     Quit date: 2013     Years since quittin.2    Smokeless tobacco: Never   Vaping Use    Vaping status: Never Used   Substance and Sexual Activity    Alcohol use: Not Currently     Comment: socially    Drug use: No     Types: Methamphetamines     Comment: Last used 2013    Sexual activity: Yes     Partners: Male     Comment: None        Past Family Hx:      History reviewed. No pertinent family history.    Problem list, medications, and allergies reviewed by myself today in Epic.     Objective:     Vitals:    24 1036   BP: 114/80   Pulse: 68   Resp: 18   Temp: 36.4 °C (97.6 °F)   SpO2: 98%       Physical Exam  Vitals reviewed.   Constitutional:       General: She is not in acute distress.     Appearance: Normal appearance. She is normal weight. She is not ill-appearing, toxic-appearing or diaphoretic.   HENT:      Head: Normocephalic and atraumatic.      Nose: Nose normal.      Mouth/Throat:      Mouth: Mucous membranes are moist.   Eyes:      Extraocular Movements: Extraocular movements intact.      Conjunctiva/sclera: Conjunctivae normal.      Pupils: Pupils are equal, round, and reactive to light.   Cardiovascular:      Rate and Rhythm: Normal rate and regular rhythm.      Pulses: Normal pulses.      Heart sounds: Normal heart sounds.   Pulmonary:      Effort: Pulmonary effort is normal.   Abdominal:      General: Abdomen is flat.      Palpations: Abdomen is soft.      Tenderness: There is no abdominal tenderness. There is no right  CVA tenderness, left CVA tenderness or guarding.   Genitourinary:     General: Normal vulva.      Exam position: Knee-chest position.      Labia:         Right: Lesion present. No rash, tenderness or injury.         Left: No rash, tenderness, lesion or injury.           Comments: Papular lesion present on the right labial surface.  The white central clearing.  No drainage.  No blisters.  Palpable raised area under the skin in the perianal area.  No visual papule or rash.  Musculoskeletal:         General: Normal range of motion.      Cervical back: Normal range of motion.   Skin:     General: Skin is warm and dry.   Neurological:      General: No focal deficit present.      Mental Status: She is alert and oriented to person, place, and time. Mental status is at baseline.   Psychiatric:         Mood and Affect: Mood normal.         Behavior: Behavior normal.         Thought Content: Thought content normal.             Results from POCT:   Results for orders placed or performed in visit on 08/09/24   POCT Urinalysis   Result Value Ref Range    POC Color YELLOW Negative    POC Appearance CLEAR Negative    POC Glucose NEGATIVE Negative mg/dL    POC Bilirubin NEGATIVE Negative mg/dL    POC Ketones NEGATIVE Negative mg/dL    POC Specific Gravity 1.025 <1.005 - >1.030    POC Blood SMALL Negative    POC Urine PH 5.0 5.0 - 8.0    POC Protein NEGATIVE Negative mg/dL    POC Urobiligen 0.2 E.U./dL Negative (0.2) mg/dL    POC Nitrites NEGATIVE Negative    POC Leukocyte Esterase SMALL Negative   POCT Pregnancy   Result Value Ref Range    POC Urine Pregnancy Test Negative     Internal Control Positive Positive     Internal Control Negative Negative        Assessment/Plan:     Diagnosis and associated orders:     1. Dysuria  - POCT Urinalysis  - POCT Pregnancy  - Chlamydia/GC, PCR (Urine); Future  - VAGINAL PATHOGENS DNA PANEL; Future  - URINE CULTURE(NEW); Future    2. Screening examination for STI  - HERPES VIRAL CULTURE  - HIV  AG/AB Combo Assay Screening; Future  - T.Pallidum AB RUDDY (Screening); Future  - Hepatitis C Virus Antibody; Future  - HEP B Surface Antibody; Future  - Hep B Core AB Total; Future  - Hep B Surface Antigen; Future  - HSV 1/2 IGG W/ TYPE SPECIFIC RFLX; Future    3. Vaginal lesion  - HERPES VIRAL CULTURE    4. Acute cystitis with hematuria  - nitrofurantoin (MACROBID) 100 MG Cap; Take 1 Capsule by mouth every 12 hours for 5 days.  Dispense: 10 Capsule; Refill: 0  - phenazopyridine (PYRIDIUM) 200 MG Tab; Take 1 Tablet by mouth 3 times a day for 2 days.  Dispense: 6 Tablet; Refill: 0          Comments/MDM:     Patient was nontoxic, stable. Ambulatory. Exam as above.  POCT urinalysis positive for leukocytes, small amount of blood.  Negative for nitrates.  I engaged in a comprehensive conversation with the patient regarding the differential diagnosis.  At this time, she would like to be empirically treated with nitrofurantoin for presumed UTI.  Urine culture in progress  She also endorses that she thought that she may have been suffering from BV last week.  She treated herself at home with a solution of boric acid.  After using this substance, she noticed 2 small lesions.  I observed a small papule with a white central clearing on the right side of the labia.  Likely folliculitis versus HSV 2.  Supportive measures for folliculitis reviewed/provided to the patient via AVS.  Viral swab in progress.  Comprehensive STI panel ordered.  Gonorrhea chlamydia testing in progress.  Recommend to push PO fluids and electrolytes, complete course of antibiotics, NSAIDs/Tylenol, Azo as needed for dysuria, d-mannose/raw, sugar-free cranberry juice for prevention of future UTIs, observe for resolution  Reviewed external records.   Discharged with instructions to obtain outpatient follow up of patient’s symptoms and findings, with strict return precautions if patient develops new or worsening symptoms.   Follow-up with PCP or UC with lack  of resolution or progression of symptoms.           All questions answered. Patient verbalized understanding and is in agreement with this plan of care.     If symptoms are worsening or not improving in 3-5 days, follow-up with PCP or return to UC. Differential diagnosis, natural history, and supportive care discussed. AVS handout given and reviewed with patient. Patient educated on red flags and when to seek treatment back in ED or UC.     I personally reviewed prior external notes and test results pertinent to today's visit.  I have independently reviewed and interpreted all diagnostics ordered during this urgent care visit.     This dictation has been created using voice recognition software. The accuracy of the dictation is limited by the abilities of the software. I expect there may be some errors of grammar and possibly content. I made every attempt to manually correct the errors within my dictation. However, errors related to voice recognition software may still exist and should be interpreted within the appropriate context.    This note was electronically signed by EDUARD Mace

## 2024-08-11 DIAGNOSIS — N76.0 BV (BACTERIAL VAGINOSIS): ICD-10-CM

## 2024-08-11 DIAGNOSIS — B96.89 BV (BACTERIAL VAGINOSIS): ICD-10-CM

## 2024-08-11 LAB
BACTERIA UR CULT: NORMAL
SIGNIFICANT IND 70042: NORMAL
SITE SITE: NORMAL
SOURCE SOURCE: NORMAL

## 2024-08-11 RX ORDER — METRONIDAZOLE 7.5 MG/G
1 GEL VAGINAL
Qty: 5 EACH | Refills: 0 | Status: SHIPPED | OUTPATIENT
Start: 2024-08-11 | End: 2024-08-16

## 2024-08-11 NOTE — PROGRESS NOTES
Results for orders placed or performed during the hospital encounter of 08/09/24   Chlamydia/GC, PCR (Urine)    Specimen: Urine   Result Value Ref Range    C. trachomatis by PCR Negative Negative    Gc By Dna Probe Negative Negative    Source Urine    URINE CULTURE(NEW)    Specimen: Urine   Result Value Ref Range    Significant Indicator NEG     Source UR     Site -     Culture Result No growth at 24 hours.    VAGINAL PATHOGENS DNA PANEL   Result Value Ref Range    Candida species DNA Probe Negative Negative    Trichamonas vaginalis DNA Probe Negative Negative    Gardnerella vaginalis DNA Probe POSITIVE (A) Negative   HSV-1 AND HSV-2 SUBTYPE, PCR   Result Value Ref Range    HSV Subtype Source genital    VZV PCR   Result Value Ref Range    Specimen Source genital      1. BV (bacterial vaginosis)  - metroNIDAZOLE (METROGEL-VAGINAL) 0.75 % Gel; Insert 1 Applicator into the vagina at bedtime for 5 days.  Dispense: 5 Each; Refill: 0    Prescription sent to patient's pharmacy  Cautioned patient against side effects of metronidazole (flagyl) including: GI upset, metallic taste, dizziness, and the possibility of disulfiram-like reactions if consumed within 72 hours of ethanol.

## 2024-08-13 LAB
HSV1 DNA CSF QL NAA+PROBE: NOT DETECTED
HSV2 DNA CSF QL NAA+PROBE: NOT DETECTED
SPECIMEN SOURCE: NORMAL

## 2024-08-14 LAB
SPECIMEN SOURCE: NORMAL
VZV DNA SPEC QL NAA+PROBE: NOT DETECTED

## 2024-09-04 RX ORDER — LEVOTHYROXINE SODIUM 50 UG/1
TABLET ORAL
Qty: 90 TABLET | Refills: 1 | Status: SHIPPED | OUTPATIENT
Start: 2024-09-04

## 2024-09-29 ENCOUNTER — APPOINTMENT (OUTPATIENT)
Dept: URGENT CARE | Facility: PHYSICIAN GROUP | Age: 29
End: 2024-09-29

## 2024-09-29 ENCOUNTER — HOSPITAL ENCOUNTER (EMERGENCY)
Facility: MEDICAL CENTER | Age: 29
End: 2024-09-30
Attending: STUDENT IN AN ORGANIZED HEALTH CARE EDUCATION/TRAINING PROGRAM
Payer: COMMERCIAL

## 2024-09-29 VITALS
RESPIRATION RATE: 16 BRPM | BODY MASS INDEX: 33.59 KG/M2 | DIASTOLIC BLOOD PRESSURE: 70 MMHG | TEMPERATURE: 98 F | HEART RATE: 73 BPM | HEIGHT: 63 IN | SYSTOLIC BLOOD PRESSURE: 113 MMHG | OXYGEN SATURATION: 97 % | WEIGHT: 189.6 LBS

## 2024-09-29 DIAGNOSIS — R30.0 DYSURIA: ICD-10-CM

## 2024-09-29 LAB
APPEARANCE UR: CLEAR
BACTERIA #/AREA URNS HPF: NEGATIVE /HPF
BILIRUB UR QL STRIP.AUTO: NEGATIVE
COLOR UR: YELLOW
EPI CELLS #/AREA URNS HPF: NEGATIVE /HPF
GLUCOSE UR STRIP.AUTO-MCNC: NEGATIVE MG/DL
HCG UR QL: NEGATIVE
HYALINE CASTS #/AREA URNS LPF: NORMAL /LPF
KETONES UR STRIP.AUTO-MCNC: ABNORMAL MG/DL
LEUKOCYTE ESTERASE UR QL STRIP.AUTO: ABNORMAL
MICRO URNS: ABNORMAL
NITRITE UR QL STRIP.AUTO: NEGATIVE
PH UR STRIP.AUTO: 5.5 [PH] (ref 5–8)
PROT UR QL STRIP: NEGATIVE MG/DL
RBC # URNS HPF: NORMAL /HPF
RBC UR QL AUTO: ABNORMAL
SP GR UR STRIP.AUTO: 1.02
UROBILINOGEN UR STRIP.AUTO-MCNC: 0.2 MG/DL
WBC #/AREA URNS HPF: NORMAL /HPF

## 2024-09-29 PROCEDURE — 81001 URINALYSIS AUTO W/SCOPE: CPT

## 2024-09-29 PROCEDURE — 700102 HCHG RX REV CODE 250 W/ 637 OVERRIDE(OP): Mod: UD | Performed by: STUDENT IN AN ORGANIZED HEALTH CARE EDUCATION/TRAINING PROGRAM

## 2024-09-29 PROCEDURE — 87491 CHLMYD TRACH DNA AMP PROBE: CPT

## 2024-09-29 PROCEDURE — A9270 NON-COVERED ITEM OR SERVICE: HCPCS | Mod: UD | Performed by: STUDENT IN AN ORGANIZED HEALTH CARE EDUCATION/TRAINING PROGRAM

## 2024-09-29 PROCEDURE — 99283 EMERGENCY DEPT VISIT LOW MDM: CPT

## 2024-09-29 PROCEDURE — 81025 URINE PREGNANCY TEST: CPT

## 2024-09-29 PROCEDURE — 87591 N.GONORRHOEAE DNA AMP PROB: CPT

## 2024-09-29 RX ORDER — PHENAZOPYRIDINE HYDROCHLORIDE 200 MG/1
200 TABLET, FILM COATED ORAL 3 TIMES DAILY PRN
Qty: 6 TABLET | Refills: 0 | Status: SHIPPED | OUTPATIENT
Start: 2024-09-29

## 2024-09-29 RX ORDER — PHENAZOPYRIDINE HYDROCHLORIDE 100 MG/1
100 TABLET, FILM COATED ORAL ONCE
Status: COMPLETED | OUTPATIENT
Start: 2024-09-29 | End: 2024-09-29

## 2024-09-29 RX ORDER — IBUPROFEN 600 MG/1
600 TABLET, FILM COATED ORAL ONCE
Status: COMPLETED | OUTPATIENT
Start: 2024-09-29 | End: 2024-09-29

## 2024-09-29 RX ADMIN — PHENAZOPYRIDINE 100 MG: 100 TABLET ORAL at 22:50

## 2024-09-29 RX ADMIN — IBUPROFEN 600 MG: 600 TABLET, FILM COATED ORAL at 22:50

## 2024-09-29 ASSESSMENT — FIBROSIS 4 INDEX: FIB4 SCORE: 0.71

## 2024-09-30 ENCOUNTER — PHARMACY VISIT (OUTPATIENT)
Dept: PHARMACY | Facility: MEDICAL CENTER | Age: 29
End: 2024-09-30
Payer: COMMERCIAL

## 2024-09-30 PROCEDURE — RXMED WILLOW AMBULATORY MEDICATION CHARGE: Performed by: STUDENT IN AN ORGANIZED HEALTH CARE EDUCATION/TRAINING PROGRAM

## 2024-09-30 NOTE — ED TRIAGE NOTES
Vitals:    09/29/24 2120   BP: 121/81   Pulse: 73   Resp: 14   Temp: 36.2 °C (97.1 °F)   SpO2: 97%     Chief Complaint   Patient presents with    Painful Urination     Pt reports she had a urinary infection dx about a month ago and was given ABX, she only took a couple of days worth and stopped because she felt better. Her symptoms came back and she was again prescribed ABX and she finished them a few days ago. She reports her symptoms are minimally improved but still quite bothersome, she has been taking AZO frequently. She reports pain to her pelvis.     Pt is ambulatory to and from triage and is alert and oriented x 4.     Urine sample collected and sent.     RN educated pt on importance of always completing antibiotics unless otherwise directed by a physician. Pt verbalized understanding.

## 2024-09-30 NOTE — ED NOTES
Patient resting, even chest rise and fall noted, VSS, NAD noted. No identifiable needs noted at this time. Bed in lowest position and locked. Call light within reach.

## 2024-09-30 NOTE — ED PROVIDER NOTES
ER Provider Note    Scribed for Tayla Saeed M.D. by Negar Thompson. 9/29/2024   10:13 PM    Primary Care Provider: Lu Lin M.D.    CHIEF COMPLAINT  Chief Complaint   Patient presents with    Painful Urination         HPI/ROS  LIMITATION TO HISTORY   Select: : None  OUTSIDE HISTORIAN(S):  None    Juana Infante is a 29 y.o. female who presents to the ED for evaluation of painful urination onset a week ago. Patient reports that she has this pain about a month ago and was given antibiotics, however did not finish them due to her symptoms going away. About a week ago, her symptoms began again, including painful urination, increased frequency to urinate, chills, nausea, and pressure in her bladder. She notes that she was given Nitrofuran and took a 3 day course, finishing them 5 days ago. Denies any flank pain, fever, vomiting, diarrhea, or vaginal discharge. Denies any chance of pregnancy. Denies any concerns for STI's, initially, however several minutes after initial evaluation notified by RN patient requests screening for STIs. Patient reports that she had her gallbladder removed. She reports that she has taken peridium before. The patient has no known allergies.      PAST MEDICAL HISTORY  Past Medical History:   Diagnosis Date    Anxiety 2013    Depression 2013    Migraine      SURGICAL HISTORY  Past Surgical History:   Procedure Laterality Date    GIANFRANCO BY LAPAROSCOPY N/A 10/13/2020    Procedure: CHOLECYSTECTOMY, LAPAROSCOPIC;  Surgeon: Spencer Nur D.O.;  Location: SURGERY [facility];  Service: General    TONSILLECTOMY       FAMILY HISTORY  No family history noted.     SOCIAL HISTORY   reports that she quit smoking about 11 years ago. Her smoking use included cigarettes. She has never used smokeless tobacco. She reports that she does not currently use alcohol. She reports that she does not use drugs.    CURRENT MEDICATIONS  Previous Medications    LEVOTHYROXINE (SYNTHROID) 50 MCG TAB    TAKE 1  "TABLET BY MOUTH EVERY DAY IN THE MORNING ON AN EMPTY STOMACH       ALLERGIES  No Known Allergies     PHYSICAL EXAM  /81   Pulse 73   Temp 36.2 °C (97.1 °F) (Temporal)   Resp 14   Ht 1.6 m (5' 3\")   Wt 86 kg (189 lb 9.5 oz)   SpO2 97%   BMI 33.59 kg/m²      General: Pleasant female nontoxic-appearing and alert.  Head: Normocephalic atraumatic  Eyes: Extraocular motion intact  Neck: Supple, no rigidity  Cardiovascular: Regular rate and rhythm via peripheral pulses   Respiratory: equal chest rise and fall, no increased work of breathing  Abdomen: Mild suprapubic tenderness, No right lower quadrant tenderness, Soft, no guarding  Back: no CVA tenderness  Musculoskeletal: Warm and well perfused, no peripheral edema  Neuro: Alert, no focal deficits  Integumentary: Well-healed surgical scar on abdomen. No wounds or rashes     DIAGNOSTIC STUDIES    Labs:   Labs Reviewed   URINALYSIS - Abnormal; Notable for the following components:       Result Value    Ketones Trace (*)     Leukocyte Esterase Trace (*)     Occult Blood Trace (*)     All other components within normal limits   HCG QUALITATIVE UR   CHLAMYDIA/GC, PCR (URINE)   URINE MICROSCOPIC (W/UA)     INITIAL ASSESSMENT COURSE AND PLAN  Care Narrative     10:13 PM - Patient was evaluated at bedside. painful urination onset a week ago. Patient reports that she had the same symptoms a month ago and took antibiotics, but did not finish the course due to her symptoms resolving. Notes that her symptoms started back up again a week ago, with painful urination, increased frequency to urinate, chills, nausea, and pressure in her bladder. Denies any side pain, fever, vomiting, diarrhea, or vaginal discharge. Reports that she took Nitrofuran for 3 days and finished them about 5 days ago. See HPI for further details. Physical exam reveals that she has mild suprapubic tenderness. Ordered for labs to evaluate. The patient will be medicated with Pyridium and Motrin for her " symptoms. Patient verbalizes understanding and support with my plan of care.  Differential diagnoses include but not limited to: UTI cystitis STI    Given nature of complaints, have a low suspicion for appendicitis, low suspicion for other surgical emergency, thus, labs, imaging was deferred.    11:41 PM - Patient was reevaluated at bedside. Reviewed patient's laboratory, imaging results at the bedside and informed her that the STI results are still pending. I advised the patient to take Pyridium as needed for burning urination. Patient is agreeable to discharge, we discussed strict return precautions, and outpatient follow-up, patient was discharged in no acute distress. I advised the patient to keep an eye out for any phone calls from unknown numbers over the next couple of days for her lab results. All questions were answered prior to discharge.     DISPOSITION AND DISCUSSIONS    I have discussed management of the patient with the following physicians and BIPIN's:  None    Discussion of management with other QHP or appropriate source(s): None     Escalation of care considered, and ultimately not performed: Laboratory analysis and diagnostic imaging.    Barriers to care at this time, including but not limited to:  None .     Decision tools and prescription drugs considered including, but not limited to: Pain Medications Pyridium .    The patient will return for new or worsening symptoms and is stable at the time of discharge.    The patient is referred to a primary physician for blood pressure management, diabetic screening, and for all other preventative health concerns.    DISPOSITION:  Patient will be discharged home in stable condition.    FOLLOW UP:  PCP    OUTPATIENT MEDICATIONS:  Discharge Medication List as of 9/30/2024 12:00 AM        START taking these medications    Details   phenazopyridine (PYRIDIUM) 200 MG Tab Take 1 Tablet by mouth 3 times a day as needed for Mild Pain or Moderate Pain., Disp-6  Tablet, R-0, Normal           FINAL DIAGNOSIS  1. Dysuria       Negar ANDERSON (Scribe), am scribing for, and in the presence of, Lino Saeed M.D..    Electronically signed by: Negar Thompson (Scribe), 9/29/2024    Lino ANDERSON M.D. personally performed the services described in this documentation, as scribed by Negar Thompson in my presence, and it is both accurate and complete.      The note accurately reflects work and decisions made by me.  Lino Saeed M.D.  9/30/2024  2:29 AM

## 2024-09-30 NOTE — DISCHARGE INSTRUCTIONS
Your STD screening is pending.  You can take the Pyridium as needed for burning.  Follow-up with a primary care doctor for further evaluation, remember to answer any phone calls from unknown numbers of the next several days as if your cultures are positive, they will call you to prescribe antibiotics.

## 2025-03-10 ENCOUNTER — APPOINTMENT (OUTPATIENT)
Dept: MEDICAL GROUP | Facility: PHYSICIAN GROUP | Age: 30
End: 2025-03-10
Payer: COMMERCIAL

## 2025-03-11 ENCOUNTER — HOSPITAL ENCOUNTER (OUTPATIENT)
Facility: MEDICAL CENTER | Age: 30
End: 2025-03-11
Attending: FAMILY MEDICINE
Payer: COMMERCIAL

## 2025-03-11 ENCOUNTER — OFFICE VISIT (OUTPATIENT)
Dept: MEDICAL GROUP | Facility: PHYSICIAN GROUP | Age: 30
End: 2025-03-11
Payer: COMMERCIAL

## 2025-03-11 VITALS
HEART RATE: 80 BPM | TEMPERATURE: 97.7 F | BODY MASS INDEX: 32.64 KG/M2 | OXYGEN SATURATION: 97 % | SYSTOLIC BLOOD PRESSURE: 116 MMHG | DIASTOLIC BLOOD PRESSURE: 70 MMHG | RESPIRATION RATE: 16 BRPM | WEIGHT: 184.2 LBS | HEIGHT: 63 IN

## 2025-03-11 DIAGNOSIS — E78.00 ELEVATED LDL CHOLESTEROL LEVEL: ICD-10-CM

## 2025-03-11 DIAGNOSIS — L65.9 HAIR LOSS: ICD-10-CM

## 2025-03-11 DIAGNOSIS — E03.8 OTHER SPECIFIED HYPOTHYROIDISM: ICD-10-CM

## 2025-03-11 DIAGNOSIS — Z20.2 POSSIBLE EXPOSURE TO STD: ICD-10-CM

## 2025-03-11 DIAGNOSIS — R30.0 DYSURIA: ICD-10-CM

## 2025-03-11 DIAGNOSIS — E55.9 VITAMIN D DEFICIENCY: ICD-10-CM

## 2025-03-11 DIAGNOSIS — R53.83 OTHER FATIGUE: ICD-10-CM

## 2025-03-11 LAB
APPEARANCE UR: CLEAR
BILIRUB UR STRIP-MCNC: NORMAL MG/DL
COLOR UR AUTO: YELLOW
GLUCOSE UR STRIP.AUTO-MCNC: NORMAL MG/DL
KETONES UR STRIP.AUTO-MCNC: NORMAL MG/DL
LEUKOCYTE ESTERASE UR QL STRIP.AUTO: NORMAL
NITRITE UR QL STRIP.AUTO: NORMAL
PH UR STRIP.AUTO: 7 [PH] (ref 5–8)
PROT UR QL STRIP: NORMAL MG/DL
RBC UR QL AUTO: NORMAL
SP GR UR STRIP.AUTO: 1.01
UROBILINOGEN UR STRIP-MCNC: 0.2 MG/DL

## 2025-03-11 PROCEDURE — 3074F SYST BP LT 130 MM HG: CPT | Performed by: FAMILY MEDICINE

## 2025-03-11 PROCEDURE — 87491 CHLMYD TRACH DNA AMP PROBE: CPT

## 2025-03-11 PROCEDURE — 87591 N.GONORRHOEAE DNA AMP PROB: CPT

## 2025-03-11 PROCEDURE — 99214 OFFICE O/P EST MOD 30 MIN: CPT | Performed by: FAMILY MEDICINE

## 2025-03-11 PROCEDURE — 81002 URINALYSIS NONAUTO W/O SCOPE: CPT | Performed by: FAMILY MEDICINE

## 2025-03-11 PROCEDURE — 3078F DIAST BP <80 MM HG: CPT | Performed by: FAMILY MEDICINE

## 2025-03-11 ASSESSMENT — PATIENT HEALTH QUESTIONNAIRE - PHQ9: CLINICAL INTERPRETATION OF PHQ2 SCORE: 0

## 2025-03-11 ASSESSMENT — FIBROSIS 4 INDEX: FIB4 SCORE: 0.71

## 2025-03-11 NOTE — LETTER
March 11, 2025         Patient: Juana Infante   YOB: 1995   Date of Visit: 3/11/2025           To Whom it May Concern:    Juana Infante was seen in my clinic on 3/11/2025.     If you have any questions or concerns, please don't hesitate to call.        Sincerely,           Lu Lin M.D.  Electronically Signed

## 2025-03-11 NOTE — PROGRESS NOTES
Subjective:     CC:   Chief Complaint   Patient presents with    Follow-Up       HPI:   Juana presents today due to the fact that she has noticed that she is starting to lose her hair for the last maybe a month or so.  Patient denies any stressors in her life.  Patient does have monthly menstrual cycles she bleeds for about 2 to 3 days those days are heavy.  Patient is also wanting to get pregnant.  Patient is also requesting STD testing she denies any symptoms.  Patient also would like to be tested for a urinary tract infection she is having some discomfort when she urinates.    Past Medical History:   Diagnosis Date    Anxiety     Depression     Migraine        Social History     Tobacco Use    Smoking status: Former     Current packs/day: 0.00     Types: Cigarettes     Quit date: 2013     Years since quittin.8    Smokeless tobacco: Never   Vaping Use    Vaping status: Never Used   Substance Use Topics    Alcohol use: Not Currently     Comment: socially    Drug use: No     Types: Methamphetamines     Comment: Last used 2013       Current Outpatient Medications Ordered in Epic   Medication Sig Dispense Refill    phenazopyridine (PYRIDIUM) 200 MG Tab Take 1 Tablet by mouth 3 times a day as needed for Mild Pain or Moderate Pain. 6 Tablet 0    levothyroxine (SYNTHROID) 50 MCG Tab TAKE 1 TABLET BY MOUTH EVERY DAY IN THE MORNING ON AN EMPTY STOMACH 90 Tablet 1     No current Epic-ordered facility-administered medications on file.     Past Surgical History:   Procedure Laterality Date    GIANFRANCO BY LAPAROSCOPY N/A 10/13/2020    Procedure: CHOLECYSTECTOMY, LAPAROSCOPIC;  Surgeon: Spencer Nur D.O.;  Location: SURGERY [facility];  Service: General    LIPOSUCTION      Buttocks procedure    TONSILLECTOMY       Family History   Problem Relation Age of Onset    No Known Problems Mother     No Known Problems Father     No Known Problems Sister     No Known Problems Brother     No Known Problems Maternal  "Grandmother     No Known Problems Maternal Grandfather     No Known Problems Paternal Grandmother     No Known Problems Paternal Grandfather      Allergies:  Patient has no known allergies.    Health Maintenance: Completed    ROS:  Gen: no fevers/chills, no changes in weight  Eyes: no changes in vision  ENT: no sore throat, no hearing loss, no bloody nose  Pulm: no sob, no cough  CV: no chest pain, no palpitations  GI: no nausea/vomiting, no diarrhea  Neuro: no headaches, no numbness/tingling  Heme/Lymph: no easy bruising    Objective:     Exam:  /70 (BP Location: Right arm, Patient Position: Sitting, BP Cuff Size: Adult)   Pulse 80   Temp 36.5 °C (97.7 °F)   Resp 16   Ht 1.6 m (5' 3\")   Wt 83.6 kg (184 lb 3.2 oz)   LMP 02/23/2025   SpO2 97%   BMI 32.63 kg/m²  Body mass index is 32.63 kg/m².    Gen: Alert and oriented, No apparent distress.  Skin: Warm and dry.  No obvious lesions.  On examination of scalp there are no bald spots or broken hairs noted.  Eyes: Sclera wnl Pupils normal in size  Lungs: Normal effort, CTA bilaterally, no wheezes, rhonchi, or rales  CV: Regular rate and rhythm. No murmurs, rubs, or gallops.  ABD: Soft non-tender no organomegaly  Musculoskeletal: Normal gait. No extremity cyanosis, clubbing, or edema.  Neuro: Oriented to person, place and time  Psych: Mood is wnl   Labs: Fasting labs were ordered    Assessment & Plan:     29 y.o. female with the following -     1. Hair loss  Recommend getting lab work patient very agreeable with the plan I will have my MA schedule appointment to see me back in 3 weeks    2. Other fatigue  Need to order lab work  - CBC WITH DIFFERENTIAL; Future  - FERRITIN; Future  - IRON/TOTAL IRON BIND; Future  - TSH; Future    3. Dysuria  On urinalysis everything is negative I did mention since patient requested to do a chlamydia and gonorrhea test I can start her on something for chlamydia but at this point she wants to wait and see if she is positive for " chlamydia.  - POCT Urinalysis    4. Vitamin D deficiency  Recommend checking her vitamin D  - VITAMIN D,25 HYDROXY (DEFICIENCY); Future    5. Other specified hypothyroidism  When checking her thyroid    6. Elevated LDL cholesterol level  Recommend checking her lipid panel  - Comp Metabolic Panel; Future  - Lipid Profile; Future    7. Possible exposure to STD  Patient requested to get checked for STDs she knows of no partners that may have it.  - RPR (SYPHILIS); Future  - Chlamydia/GC, PCR (Urine); Future  - HIV AG/AB COMBO ASSAY DIAGNOSTIC; Future  - HEPATITIS PANEL ACUTE(4 COMPONENTS); Future       Return in about 3 weeks (around 4/1/2025), or if symptoms worsen or fail to improve.    Please note that this dictation was created using voice recognition software. I have made every reasonable attempt to correct obvious errors, but I expect that there are errors of grammar and possibly content that I did not discover before finalizing the note.

## 2025-03-12 ENCOUNTER — HOSPITAL ENCOUNTER (OUTPATIENT)
Dept: LAB | Facility: MEDICAL CENTER | Age: 30
End: 2025-03-12
Attending: FAMILY MEDICINE
Payer: COMMERCIAL

## 2025-03-12 ENCOUNTER — HOSPITAL ENCOUNTER (OUTPATIENT)
Dept: LAB | Facility: MEDICAL CENTER | Age: 30
End: 2025-03-12
Payer: COMMERCIAL

## 2025-03-12 DIAGNOSIS — E78.00 ELEVATED LDL CHOLESTEROL LEVEL: ICD-10-CM

## 2025-03-12 DIAGNOSIS — Z20.2 POSSIBLE EXPOSURE TO STD: ICD-10-CM

## 2025-03-12 DIAGNOSIS — R53.83 OTHER FATIGUE: ICD-10-CM

## 2025-03-12 DIAGNOSIS — E55.9 VITAMIN D DEFICIENCY: ICD-10-CM

## 2025-03-12 DIAGNOSIS — Z11.3 SCREENING EXAMINATION FOR STI: ICD-10-CM

## 2025-03-12 LAB
25(OH)D3 SERPL-MCNC: 14 NG/ML (ref 30–100)
ALBUMIN SERPL BCP-MCNC: 4.5 G/DL (ref 3.2–4.9)
ALBUMIN/GLOB SERPL: 1.3 G/DL
ALP SERPL-CCNC: 78 U/L (ref 30–99)
ALT SERPL-CCNC: 23 U/L (ref 2–50)
ANION GAP SERPL CALC-SCNC: 10 MMOL/L (ref 7–16)
AST SERPL-CCNC: 24 U/L (ref 12–45)
BASOPHILS # BLD AUTO: 0.5 % (ref 0–1.8)
BASOPHILS # BLD: 0.04 K/UL (ref 0–0.12)
BILIRUB SERPL-MCNC: 0.8 MG/DL (ref 0.1–1.5)
BUN SERPL-MCNC: 9 MG/DL (ref 8–22)
CALCIUM ALBUM COR SERPL-MCNC: 9.3 MG/DL (ref 8.5–10.5)
CALCIUM SERPL-MCNC: 9.7 MG/DL (ref 8.5–10.5)
CHLORIDE SERPL-SCNC: 103 MMOL/L (ref 96–112)
CHOLEST SERPL-MCNC: 188 MG/DL (ref 100–199)
CO2 SERPL-SCNC: 23 MMOL/L (ref 20–33)
CREAT SERPL-MCNC: 0.73 MG/DL (ref 0.5–1.4)
EOSINOPHIL # BLD AUTO: 0.23 K/UL (ref 0–0.51)
EOSINOPHIL NFR BLD: 2.9 % (ref 0–6.9)
ERYTHROCYTE [DISTWIDTH] IN BLOOD BY AUTOMATED COUNT: 40.8 FL (ref 35.9–50)
FERRITIN SERPL-MCNC: 21.9 NG/ML (ref 10–291)
GFR SERPLBLD CREATININE-BSD FMLA CKD-EPI: 112 ML/MIN/1.73 M 2
GLOBULIN SER CALC-MCNC: 3.4 G/DL (ref 1.9–3.5)
GLUCOSE SERPL-MCNC: 86 MG/DL (ref 65–99)
HAV IGM SERPL QL IA: NORMAL
HBV CORE AB SERPL QL IA: NONREACTIVE
HBV CORE IGM SER QL: NORMAL
HBV SURFACE AB SERPL IA-ACNC: 5.41 MIU/ML (ref 0–10)
HBV SURFACE AG SER QL: NORMAL
HCT VFR BLD AUTO: 39.8 % (ref 37–47)
HCV AB SER QL: NORMAL
HDLC SERPL-MCNC: 41 MG/DL
HGB BLD-MCNC: 12.7 G/DL (ref 12–16)
HIV 1+2 AB+HIV1 P24 AG SERPL QL IA: NORMAL
IMM GRANULOCYTES # BLD AUTO: 0.08 K/UL (ref 0–0.11)
IMM GRANULOCYTES NFR BLD AUTO: 1 % (ref 0–0.9)
IRON SATN MFR SERPL: 18 % (ref 15–55)
IRON SERPL-MCNC: 72 UG/DL (ref 40–170)
LDLC SERPL CALC-MCNC: 121 MG/DL
LYMPHOCYTES # BLD AUTO: 2.87 K/UL (ref 1–4.8)
LYMPHOCYTES NFR BLD: 35.8 % (ref 22–41)
MCH RBC QN AUTO: 27 PG (ref 27–33)
MCHC RBC AUTO-ENTMCNC: 31.9 G/DL (ref 32.2–35.5)
MCV RBC AUTO: 84.7 FL (ref 81.4–97.8)
MONOCYTES # BLD AUTO: 0.37 K/UL (ref 0–0.85)
MONOCYTES NFR BLD AUTO: 4.6 % (ref 0–13.4)
NEUTROPHILS # BLD AUTO: 4.43 K/UL (ref 1.82–7.42)
NEUTROPHILS NFR BLD: 55.2 % (ref 44–72)
NRBC # BLD AUTO: 0 K/UL
NRBC BLD-RTO: 0 /100 WBC (ref 0–0.2)
PLATELET # BLD AUTO: 306 K/UL (ref 164–446)
PMV BLD AUTO: 10.3 FL (ref 9–12.9)
POTASSIUM SERPL-SCNC: 4.3 MMOL/L (ref 3.6–5.5)
PROT SERPL-MCNC: 7.9 G/DL (ref 6–8.2)
RBC # BLD AUTO: 4.7 M/UL (ref 4.2–5.4)
SODIUM SERPL-SCNC: 136 MMOL/L (ref 135–145)
T PALLIDUM AB SER QL IA: NORMAL
TIBC SERPL-MCNC: 408 UG/DL (ref 250–450)
TRIGL SERPL-MCNC: 128 MG/DL (ref 0–149)
TSH SERPL-ACNC: 1.16 UIU/ML (ref 0.35–5.5)
UIBC SERPL-MCNC: 336 UG/DL (ref 110–370)
WBC # BLD AUTO: 8 K/UL (ref 4.8–10.8)

## 2025-03-12 PROCEDURE — 83540 ASSAY OF IRON: CPT

## 2025-03-12 PROCEDURE — 86696 HERPES SIMPLEX TYPE 2 TEST: CPT

## 2025-03-12 PROCEDURE — 80053 COMPREHEN METABOLIC PANEL: CPT

## 2025-03-12 PROCEDURE — 80061 LIPID PANEL: CPT

## 2025-03-12 PROCEDURE — 82728 ASSAY OF FERRITIN: CPT

## 2025-03-12 PROCEDURE — 85025 COMPLETE CBC W/AUTO DIFF WBC: CPT

## 2025-03-12 PROCEDURE — 80074 ACUTE HEPATITIS PANEL: CPT

## 2025-03-12 PROCEDURE — 86695 HERPES SIMPLEX TYPE 1 TEST: CPT

## 2025-03-12 PROCEDURE — 84443 ASSAY THYROID STIM HORMONE: CPT

## 2025-03-12 PROCEDURE — 86704 HEP B CORE ANTIBODY TOTAL: CPT

## 2025-03-12 PROCEDURE — 86780 TREPONEMA PALLIDUM: CPT

## 2025-03-12 PROCEDURE — G0475 HIV COMBINATION ASSAY: HCPCS

## 2025-03-12 PROCEDURE — 82306 VITAMIN D 25 HYDROXY: CPT

## 2025-03-12 PROCEDURE — 36415 COLL VENOUS BLD VENIPUNCTURE: CPT

## 2025-03-12 PROCEDURE — 83550 IRON BINDING TEST: CPT

## 2025-03-12 PROCEDURE — 86706 HEP B SURFACE ANTIBODY: CPT

## 2025-03-12 PROCEDURE — 86694 HERPES SIMPLEX NES ANTBDY: CPT

## 2025-03-14 LAB
HSV1 GG IGG SER-ACNC: 27.8 IV
HSV1+2 IGG SER IA-ACNC: >22.4 IV
HSV2 GG IGG SER-ACNC: 0.92 IV

## 2025-03-14 RX ORDER — DOXYCYCLINE HYCLATE 100 MG
100 TABLET ORAL 2 TIMES DAILY
Qty: 14 TABLET | Refills: 0 | Status: SHIPPED | OUTPATIENT
Start: 2025-03-14

## 2025-04-04 ENCOUNTER — APPOINTMENT (OUTPATIENT)
Dept: MEDICAL GROUP | Facility: PHYSICIAN GROUP | Age: 30
End: 2025-04-04
Payer: COMMERCIAL

## 2025-04-14 ENCOUNTER — APPOINTMENT (OUTPATIENT)
Dept: MEDICAL GROUP | Facility: PHYSICIAN GROUP | Age: 30
End: 2025-04-14
Payer: COMMERCIAL

## 2025-04-16 ENCOUNTER — APPOINTMENT (OUTPATIENT)
Dept: MEDICAL GROUP | Facility: PHYSICIAN GROUP | Age: 30
End: 2025-04-16
Payer: COMMERCIAL

## 2025-04-18 ENCOUNTER — APPOINTMENT (OUTPATIENT)
Dept: MEDICAL GROUP | Facility: PHYSICIAN GROUP | Age: 30
End: 2025-04-18
Payer: COMMERCIAL

## 2025-04-27 ENCOUNTER — OFFICE VISIT (OUTPATIENT)
Dept: URGENT CARE | Facility: PHYSICIAN GROUP | Age: 30
End: 2025-04-27
Payer: COMMERCIAL

## 2025-04-27 ENCOUNTER — APPOINTMENT (OUTPATIENT)
Dept: URGENT CARE | Facility: PHYSICIAN GROUP | Age: 30
End: 2025-04-27
Payer: COMMERCIAL

## 2025-04-27 ENCOUNTER — HOSPITAL ENCOUNTER (OUTPATIENT)
Facility: MEDICAL CENTER | Age: 30
End: 2025-04-27
Attending: STUDENT IN AN ORGANIZED HEALTH CARE EDUCATION/TRAINING PROGRAM
Payer: COMMERCIAL

## 2025-04-27 VITALS
RESPIRATION RATE: 16 BRPM | OXYGEN SATURATION: 98 % | TEMPERATURE: 98.4 F | HEART RATE: 80 BPM | WEIGHT: 185.19 LBS | SYSTOLIC BLOOD PRESSURE: 116 MMHG | BODY MASS INDEX: 31.62 KG/M2 | HEIGHT: 64 IN | DIASTOLIC BLOOD PRESSURE: 78 MMHG

## 2025-04-27 DIAGNOSIS — R39.9 LOWER URINARY TRACT SYMPTOMS (LUTS): ICD-10-CM

## 2025-04-27 DIAGNOSIS — Z71.1 CONCERN ABOUT STD IN FEMALE WITHOUT DIAGNOSIS: ICD-10-CM

## 2025-04-27 LAB
APPEARANCE UR: CLEAR
BILIRUB UR STRIP-MCNC: NEGATIVE MG/DL
COLOR UR AUTO: NORMAL
GLUCOSE UR STRIP.AUTO-MCNC: NEGATIVE MG/DL
KETONES UR STRIP.AUTO-MCNC: NEGATIVE MG/DL
LEUKOCYTE ESTERASE UR QL STRIP.AUTO: NEGATIVE
NITRITE UR QL STRIP.AUTO: NEGATIVE
PH UR STRIP.AUTO: 6.5 [PH] (ref 5–8)
POCT INT CON NEG: NEGATIVE
POCT INT CON POS: POSITIVE
POCT URINE PREGNANCY TEST: NEGATIVE
PROT UR QL STRIP: NEGATIVE MG/DL
RBC UR QL AUTO: NEGATIVE
SP GR UR STRIP.AUTO: 1.02
UROBILINOGEN UR STRIP-MCNC: NORMAL MG/DL

## 2025-04-27 PROCEDURE — 87086 URINE CULTURE/COLONY COUNT: CPT

## 2025-04-27 PROCEDURE — 87480 CANDIDA DNA DIR PROBE: CPT

## 2025-04-27 PROCEDURE — 87510 GARDNER VAG DNA DIR PROBE: CPT

## 2025-04-27 PROCEDURE — 87591 N.GONORRHOEAE DNA AMP PROB: CPT

## 2025-04-27 PROCEDURE — 87660 TRICHOMONAS VAGIN DIR PROBE: CPT

## 2025-04-27 PROCEDURE — 87491 CHLMYD TRACH DNA AMP PROBE: CPT

## 2025-04-27 RX ORDER — DOXYCYCLINE HYCLATE 100 MG
100 TABLET ORAL 2 TIMES DAILY
Qty: 14 TABLET | Refills: 0 | Status: SHIPPED | OUTPATIENT
Start: 2025-04-27 | End: 2025-05-04

## 2025-04-27 ASSESSMENT — FIBROSIS 4 INDEX: FIB4 SCORE: 0.51

## 2025-04-27 NOTE — PROGRESS NOTES
Subjective:   CHIEF COMPLAINT  Chief Complaint   Patient presents with    Dysuria     Painful urination, urgency X yesterday. Requesting G/C HCG.       HPI    History of Present Illness  Juana Infante is a 29 y.o. female who presents  for evaluation of urinary symptoms.    Dysuria, urinary frequency, and urgency began yesterday. No hematuria, recent fevers, nausea, or vomiting are reported. An increase in abnormal vaginal discharge is noted, without associated pruritus or malodor. Recent sexual activity with a single partner is reported. No history of antibiotic-induced yeast infections is noted. Self-medication with Cystex provided some relief.        REVIEW OF SYSTEMS  General: no fever or chills  GI: no nausea or vomiting  See HPI for further details.    PAST MEDICAL HISTORY  Patient Active Problem List    Diagnosis Date Noted    Encounter for preoperative assessment 2024    Vitamin D deficiency 2023    Weight gain 2023    Elevated LDL cholesterol level 2023    Other specified hypothyroidism 2022       SURGICAL HISTORY   has a past surgical history that includes elkin by laparoscopy (N/A, 10/13/2020); tonsillectomy; and liposuction.    ALLERGIES  No Known Allergies    CURRENT MEDICATIONS  doxycycline Tabs  levothyroxine Tabs  phenazopyridine Tabs    SOCIAL HISTORY  Social History     Tobacco Use    Smoking status: Former     Current packs/day: 0.00     Types: Cigarettes     Quit date: 2013     Years since quittin.9    Smokeless tobacco: Never   Vaping Use    Vaping status: Never Used   Substance and Sexual Activity    Alcohol use: Not Currently     Comment: socially    Drug use: No     Types: Methamphetamines     Comment: Last used 2013    Sexual activity: Yes     Partners: Male     Comment: None       FAMILY HISTORY  Family History   Problem Relation Age of Onset    No Known Problems Mother     No Known Problems Father     No Known Problems Sister     No Known  "Problems Brother     No Known Problems Maternal Grandmother     No Known Problems Maternal Grandfather     No Known Problems Paternal Grandmother     No Known Problems Paternal Grandfather           Objective:   PHYSICAL EXAM  VITAL SIGNS: /78 (BP Location: Left arm, Patient Position: Sitting, BP Cuff Size: Adult long)   Pulse 80   Temp 36.9 °C (98.4 °F) (Temporal)   Resp 16   Ht 1.626 m (5' 4\")   Wt 84 kg (185 lb 3 oz)   SpO2 98%   BMI 31.79 kg/m²     Gen: no acute distress, normal voice  Skin: dry, intact, moist mucosal membranes  Eyes: No conjunctival injection bilaterally.  Neck: Normal range of motion. No meningeal signs.   Lungs: No increased work of breathing.  CTAB w/ symmetric expansion  CV: RRR w/o murmurs or clicks  Psych: normal affect, normal judgement, alert, awake    Assessment/Plan:     1. Lower urinary tract symptoms (LUTS)  POCT Urinalysis    POCT PREGNANCY    URINE CULTURE(NEW)    Chlamydia/GC, PCR (Genital/Anal swab)    VAGINAL PATHOGENS DNA PANEL    cefTRIAXone (Rocephin) 500 mg in lidocaine (Xylocaine) 1 % 2 mL for IM use    doxycycline (VIBRAMYCIN) 100 MG Tab      2. Concern about STD in female without diagnosis  VAGINAL PATHOGENS DNA PANEL    cefTRIAXone (Rocephin) 500 mg in lidocaine (Xylocaine) 1 % 2 mL for IM use    doxycycline (VIBRAMYCIN) 100 MG Tab      UA unremarkable.  Concern for possible STI so we will begin treatment  -Rocephin 500 mg IM x 1 given in clinic  -Ordered doxycycline 100 mg p.o. twice daily x 7 days  -No intercourse for 2 weeks or 7 days after completion of doxycycline  -Ordered GC/CT.  Will have patient continue doxycycline regardless for possibility of false negative due to early testing  -Ordered vaginal pathogens  -Ordered urine culture  -Return to urgent care any new/worsening symptoms or further questions or concerns.  Patient understood everything discussed.  All questions were answered.        Ordered 3 unique labs, ordered prescription " medications    Please note that this dictation was created using voice recognition software. I have made a reasonable attempt to correct obvious errors, but I expect that there are errors of grammar and possibly content that I did not discover before finalizing the note.

## 2025-04-28 DIAGNOSIS — R39.9 LOWER URINARY TRACT SYMPTOMS (LUTS): ICD-10-CM

## 2025-04-28 DIAGNOSIS — Z71.1 CONCERN ABOUT STD IN FEMALE WITHOUT DIAGNOSIS: ICD-10-CM

## 2025-04-28 LAB
CANDIDA DNA VAG QL PROBE+SIG AMP: NEGATIVE
G VAGINALIS DNA VAG QL PROBE+SIG AMP: NEGATIVE
T VAGINALIS DNA VAG QL PROBE+SIG AMP: NEGATIVE

## 2025-04-29 LAB
C TRACH DNA GENITAL QL NAA+PROBE: NEGATIVE
N GONORRHOEA DNA GENITAL QL NAA+PROBE: NEGATIVE
SPECIMEN SOURCE: NORMAL

## 2025-04-30 LAB
BACTERIA UR CULT: NORMAL
SIGNIFICANT IND 70042: NORMAL
SITE SITE: NORMAL
SOURCE SOURCE: NORMAL

## 2025-05-03 ENCOUNTER — RESULTS FOLLOW-UP (OUTPATIENT)
Dept: URGENT CARE | Facility: PHYSICIAN GROUP | Age: 30
End: 2025-05-03

## 2025-05-06 RX ORDER — LEVOTHYROXINE SODIUM 50 UG/1
50 TABLET ORAL
Qty: 90 TABLET | Refills: 1 | Status: SHIPPED | OUTPATIENT
Start: 2025-05-06

## 2025-05-15 ENCOUNTER — OFFICE VISIT (OUTPATIENT)
Dept: MEDICAL GROUP | Facility: PHYSICIAN GROUP | Age: 30
End: 2025-05-15
Payer: COMMERCIAL

## 2025-05-15 VITALS
DIASTOLIC BLOOD PRESSURE: 68 MMHG | OXYGEN SATURATION: 99 % | HEIGHT: 64 IN | HEART RATE: 70 BPM | RESPIRATION RATE: 16 BRPM | WEIGHT: 189 LBS | SYSTOLIC BLOOD PRESSURE: 110 MMHG | TEMPERATURE: 98.1 F | BODY MASS INDEX: 32.27 KG/M2

## 2025-05-15 DIAGNOSIS — L65.9 HAIR LOSS: ICD-10-CM

## 2025-05-15 DIAGNOSIS — E55.9 VITAMIN D DEFICIENCY: Primary | ICD-10-CM

## 2025-05-15 DIAGNOSIS — E03.8 OTHER SPECIFIED HYPOTHYROIDISM: ICD-10-CM

## 2025-05-15 DIAGNOSIS — E78.00 ELEVATED LDL CHOLESTEROL LEVEL: ICD-10-CM

## 2025-05-15 PROCEDURE — 99214 OFFICE O/P EST MOD 30 MIN: CPT | Performed by: FAMILY MEDICINE

## 2025-05-15 PROCEDURE — 3078F DIAST BP <80 MM HG: CPT | Performed by: FAMILY MEDICINE

## 2025-05-15 PROCEDURE — 3074F SYST BP LT 130 MM HG: CPT | Performed by: FAMILY MEDICINE

## 2025-05-15 RX ORDER — METRONIDAZOLE 7.5 MG/G
GEL VAGINAL
COMMUNITY
Start: 2025-04-18 | End: 2025-05-15

## 2025-05-15 ASSESSMENT — FIBROSIS 4 INDEX: FIB4 SCORE: 0.51

## 2025-05-15 NOTE — PROGRESS NOTES
"Subjective:     CC:   Chief Complaint   Patient presents with    Follow-Up       HPI:   Juana presents today not knowing what this follow-up appointment was for.  I did inform patient that she was having some issues with hair loss patient did remember this and also we were going to go over her lab results.  Patient did see her GYN provider after me and her GYN provider did some test for herpes that showed a positive titer patient denies any lesions in the past I would at this time maybe follow-up with her GYN provider to discuss the results further.  Patient was seen at urgent care and was retested for chlamydia patient was positive on my test for chlamydia she did do the doxycycline for 7 days and now her test is negative.  Patient was tearful today she found out her ex- had passed away.  Patient denies any issues with depression.    Past Medical History[1]    Social History[2]    Current Medications and Prescriptions Ordered in Epic[3]    Allergies:  Patient has no known allergies.    Health Maintenance: Completed    ROS:  Gen: no fevers/chills, no changes in weight  Eyes: no changes in vision  ENT: no sore throat, no hearing loss, no bloody nose  Pulm: no sob, no cough  CV: no chest pain, no palpitations  GI: no nausea/vomiting, no diarrhea  : no dysuria  Neuro: no headaches, no numbness/tingling  Heme/Lymph: no easy bruising    Objective:     Exam:  /68 (BP Location: Left arm, Patient Position: Sitting, BP Cuff Size: Large adult)   Pulse 70   Temp 36.7 °C (98.1 °F)   Resp 16   Ht 1.626 m (5' 4\")   Wt 85.7 kg (189 lb)   LMP 05/09/2025   SpO2 99%   BMI 32.44 kg/m²  Body mass index is 32.44 kg/m².    Gen: Alert and oriented, No apparent distress.  Skin: Warm and dry.  No obvious lesions.  Eyes: Sclera wnl Pupils normal in size  Lungs: Normal effort, CTA bilaterally, no wheezes, rhonchi, or rales  CV: Regular rate and rhythm. No murmurs, rubs, or gallops.  Musculoskeletal: Normal gait. No " extremity cyanosis, clubbing, or edema.  Neuro: Oriented to person, place and time  Psych: Mood is wnl       Assessment & Plan:     29 y.o. female with the following -     1. Vitamin D deficiency  Patient's vitamin D is very low at this point would recommend patient start taking vitamin D3 we will recheck this again in about 3 to 4 months    2. Other specified hypothyroidism  Patient's TSH is in good range continue present thyroid medication  3. Elevated LDL cholesterol level  Patient's LDL is elevated her HDL is slightly low recommend increase physical activity and watching what she is eating we can always recheck this in about 3 to 4 months    4. Hair loss  Since patient is not anemic and her thyroid test is within normal limits I offered to write a referral to dermatology at this time patient feels she wants to wait she will let me know if she changes her mind       Return in about 4 months (around 9/15/2025), or if symptoms worsen or fail to improve.  Did offer patient my MA can schedule her a follow-up appointment but patient would like to wait and schedule it herself.  I did mention if she feels sad or depressed she should always follow-up with me for evaluation patient at this time feels like she is doing well she just had the sudden news about her ex-'s death.    Please note that this dictation was created using voice recognition software. I have made every reasonable attempt to correct obvious errors, but I expect that there are errors of grammar and possibly content that I did not discover before finalizing the note.       [1]   Past Medical History:  Diagnosis Date    Anxiety     Depression     Migraine    [2]   Social History  Tobacco Use    Smoking status: Former     Current packs/day: 0.00     Types: Cigarettes     Quit date: 2013     Years since quittin.9    Smokeless tobacco: Never   Vaping Use    Vaping status: Never Used   Substance Use Topics    Alcohol use: Not Currently      Comment: socially    Drug use: No     Types: Methamphetamines     Comment: Last used 5/2013   [3]   Current Outpatient Medications Ordered in Epic   Medication Sig Dispense Refill    levothyroxine (SYNTHROID) 50 MCG Tab TAKE 1 TABLET BY MOUTH EVERY DAY IN THE MORNING ON AN EMPTY STOMACH 90 Tablet 1     No current Epic-ordered facility-administered medications on file.

## 2025-05-28 ENCOUNTER — APPOINTMENT (OUTPATIENT)
Dept: MEDICAL GROUP | Facility: PHYSICIAN GROUP | Age: 30
End: 2025-05-28
Payer: COMMERCIAL

## 2025-06-18 ENCOUNTER — HOSPITAL ENCOUNTER (OUTPATIENT)
Dept: LAB | Facility: MEDICAL CENTER | Age: 30
End: 2025-06-18
Attending: SPECIALIST
Payer: COMMERCIAL

## 2025-06-18 LAB — B-HCG SERPL-ACNC: <1 MIU/ML (ref 0–5)

## 2025-06-18 PROCEDURE — 36415 COLL VENOUS BLD VENIPUNCTURE: CPT

## 2025-06-18 PROCEDURE — 84702 CHORIONIC GONADOTROPIN TEST: CPT

## 2025-07-01 ENCOUNTER — APPOINTMENT (OUTPATIENT)
Dept: URGENT CARE | Facility: CLINIC | Age: 30
End: 2025-07-01
Payer: COMMERCIAL

## 2025-07-10 ENCOUNTER — APPOINTMENT (OUTPATIENT)
Dept: URGENT CARE | Facility: CLINIC | Age: 30
End: 2025-07-10
Payer: COMMERCIAL

## 2025-07-21 ENCOUNTER — OFFICE VISIT (OUTPATIENT)
Dept: URGENT CARE | Facility: PHYSICIAN GROUP | Age: 30
End: 2025-07-21
Payer: COMMERCIAL

## 2025-07-21 VITALS
HEART RATE: 76 BPM | OXYGEN SATURATION: 96 % | SYSTOLIC BLOOD PRESSURE: 112 MMHG | RESPIRATION RATE: 16 BRPM | BODY MASS INDEX: 33.59 KG/M2 | DIASTOLIC BLOOD PRESSURE: 70 MMHG | HEIGHT: 63 IN | WEIGHT: 189.6 LBS | TEMPERATURE: 98.3 F

## 2025-07-21 DIAGNOSIS — A08.4 VIRAL GASTROENTERITIS: Primary | ICD-10-CM

## 2025-07-21 PROCEDURE — 99213 OFFICE O/P EST LOW 20 MIN: CPT

## 2025-07-21 PROCEDURE — 3074F SYST BP LT 130 MM HG: CPT

## 2025-07-21 PROCEDURE — 3078F DIAST BP <80 MM HG: CPT

## 2025-07-21 RX ORDER — ONDANSETRON 4 MG/1
4 TABLET, FILM COATED ORAL EVERY 6 HOURS PRN
Qty: 20 TABLET | Refills: 0 | Status: SHIPPED | OUTPATIENT
Start: 2025-07-21

## 2025-07-21 ASSESSMENT — ENCOUNTER SYMPTOMS
DIARRHEA: 1
ABDOMINAL PAIN: 1

## 2025-07-21 ASSESSMENT — FIBROSIS 4 INDEX: FIB4 SCORE: 0.51

## 2025-07-22 NOTE — PROGRESS NOTES
Subjective:   Juana Infante is a 29 y.o. female who presents for Abdominal Pain (Symptoms comes and go for last 2 days, some heartburn and vomiting today) and Diarrhea (For 2 days, patient goes to the bathroom every time she eats)    Patient presents to the clinic for complaints of abdominal pain, nausea/vomiting, and diarrhea x 2 days.  Abdominal pain has been coming and going, feels like burning to the middle of the abdomen, severity is 10/10 at its worst but 0/10 when it is completely gone. Currently with no abdominal pain.  Diarrhea occurs every time the patient eats. Watery but still with some solid pieces.  Vomiting started today and has thrown up twice. Has still been able to eat and drink.   Sick exposure to daughter with all the same symptoms.  Has taken no meds for her symptoms.  Patient denies chest pain, SOB, dizziness/lightheadedness/vertigo, difficulty breathing or swallowing, palpitations or racing heart rate, fever, chills, or blood in the stool/vomitus.       Abdominal Pain  Associated symptoms include diarrhea.   Diarrhea   Associated symptoms include abdominal pain.       Review of Systems   Gastrointestinal:  Positive for abdominal pain and diarrhea.     Refer to HPI for additional details.    During this visit, appropriate PPE was worn, and hand hygiene was performed.    PMH:  has a past medical history of Anxiety (2013), Depression (2013), and Migraine.    MEDS: Current Medications[1]    ALLERGIES: Allergies[2]  SURGHX: Past Surgical History[3]  SOCHX:  reports that she quit smoking about 12 years ago. Her smoking use included cigarettes. She has never used smokeless tobacco. She reports that she does not currently use alcohol. She reports that she does not use drugs.    FH: Per HPI as applicable/pertinent.    Medications, Allergies, and current problem list reviewed today in Epic.     Objective:     /70 (BP Location: Left arm, Patient Position: Sitting, BP Cuff Size: Adult)   Pulse  "76   Temp 36.8 °C (98.3 °F) (Temporal)   Resp 16   Ht 1.6 m (5' 3\")   Wt 86 kg (189 lb 9.5 oz)   SpO2 96%     Physical Exam  Vitals and nursing note reviewed.   Constitutional:       General: She is not in acute distress.     Appearance: She is ill-appearing. She is not toxic-appearing or diaphoretic.   HENT:      Head: Normocephalic.      Right Ear: External ear normal.      Left Ear: External ear normal.      Nose: No congestion or rhinorrhea.      Mouth/Throat:      Mouth: Mucous membranes are moist.      Pharynx: No oropharyngeal exudate or posterior oropharyngeal erythema.   Eyes:      Extraocular Movements: Extraocular movements intact.      Conjunctiva/sclera: Conjunctivae normal.   Cardiovascular:      Rate and Rhythm: Normal rate and regular rhythm.      Pulses: Normal pulses.      Heart sounds: Normal heart sounds.   Pulmonary:      Effort: Pulmonary effort is normal.      Breath sounds: Normal breath sounds.   Abdominal:      General: Abdomen is flat. Bowel sounds are normal. There is no distension.      Palpations: Abdomen is soft. There is no mass.      Tenderness: There is no abdominal tenderness. There is no right CVA tenderness, left CVA tenderness, guarding or rebound.      Hernia: No hernia is present.   Musculoskeletal:      Cervical back: No rigidity.   Lymphadenopathy:      Cervical: No cervical adenopathy.   Skin:     General: Skin is warm and dry.      Capillary Refill: Capillary refill takes less than 2 seconds.      Coloration: Skin is not jaundiced or pale.   Neurological:      General: No focal deficit present.      Mental Status: She is alert and oriented to person, place, and time.   Psychiatric:         Mood and Affect: Mood normal.         Behavior: Behavior normal.         Thought Content: Thought content normal.         Judgment: Judgment normal.         Assessment/Plan:     Diagnosis and associated orders:     1. Viral gastroenteritis  - ondansetron (ZOFRAN) 4 MG Tab tablet; " Take 1 Tablet by mouth every 6 hours as needed for Nausea/Vomiting.  Dispense: 20 Tablet; Refill: 0     Comments/MDM:     Discussed that likely etiology of the patient's symptoms is viral gastroenteritis.  Zofran as needed prescribed to the patient. Discussed proper administration and dosing as well as associated adverse/side effects of prescribed medications.  Advised patient to continue OTC pharmacologic and nonpharmacologic treatment of her symptoms, including but not limited to Tylenol, Motrin, OTC nausea and diarrhea medication, brat diet, and plenty of rest and fluids with electrolytes.  Work note provided to the patient.  Patient agreeable to this plan of care.  All questions answered.  Return to clinic if symptoms persist or worsen.  Red flag symptoms warranting emergency medical services discussed, including but not limited to chest pain, SOB, wheezing, difficulty breathing or swallowing, sensation of throat closing or mass in the throat, severe intractable headache, changes to vision or hearing, palpitations or racing heart rate, worsening or more persistent abdominal pain, fever greater than 103F despite medication management, blood in the stool or vomitus, dizziness/lightheadedness/vertigo, or worsened nausea/vomiting/diarrhea.           Differential diagnosis, natural history, supportive care, and indications for immediate follow-up discussed.    Advised the patient to follow-up with the primary care physician for recheck, reevaluation, and consideration of further management.    Instructed patient to seek emergency medical attention via calling EMS or going to the Emergency Room for red flag symptoms, including but not limited to: chest pain, palpitations, fever greater than 103F, shortness of breath, wheezing, new or worsened numbness/tingling, focal or unilateral weakness, and bloody vomit/stool.     Please note that this dictation was created using voice recognition software. I have made a  reasonable attempt to correct obvious errors, but I expect that there are errors of grammar and possibly content that I did not discover before finalizing the note.    This note was electronically signed by EDUARD Porras               [1]   Current Outpatient Medications:     ondansetron (ZOFRAN) 4 MG Tab tablet, Take 1 Tablet by mouth every 6 hours as needed for Nausea/Vomiting., Disp: 20 Tablet, Rfl: 0    levothyroxine (SYNTHROID) 50 MCG Tab, TAKE 1 TABLET BY MOUTH EVERY DAY IN THE MORNING ON AN EMPTY STOMACH, Disp: 90 Tablet, Rfl: 1  [2] No Known Allergies  [3]   Past Surgical History:  Procedure Laterality Date    GIANFRANCO BY LAPAROSCOPY N/A 10/13/2020    Procedure: CHOLECYSTECTOMY, LAPAROSCOPIC;  Surgeon: Spencer Nur D.O.;  Location: SURGERY [facility];  Service: General    LIPOSUCTION      Buttocks procedure    TONSILLECTOMY

## 2025-08-11 ENCOUNTER — HOSPITAL ENCOUNTER (OUTPATIENT)
Facility: MEDICAL CENTER | Age: 30
End: 2025-08-11
Attending: PHYSICIAN ASSISTANT
Payer: COMMERCIAL

## 2025-08-11 ENCOUNTER — OFFICE VISIT (OUTPATIENT)
Dept: URGENT CARE | Facility: CLINIC | Age: 30
End: 2025-08-11
Payer: COMMERCIAL

## 2025-08-11 ENCOUNTER — APPOINTMENT (OUTPATIENT)
Dept: URGENT CARE | Facility: CLINIC | Age: 30
End: 2025-08-11
Payer: COMMERCIAL

## 2025-08-11 VITALS
BODY MASS INDEX: 33.66 KG/M2 | TEMPERATURE: 98.4 F | OXYGEN SATURATION: 97 % | DIASTOLIC BLOOD PRESSURE: 72 MMHG | HEIGHT: 63 IN | SYSTOLIC BLOOD PRESSURE: 102 MMHG | WEIGHT: 190 LBS | HEART RATE: 84 BPM | RESPIRATION RATE: 14 BRPM

## 2025-08-11 DIAGNOSIS — Z11.3 SCREENING EXAMINATION FOR STD (SEXUALLY TRANSMITTED DISEASE): ICD-10-CM

## 2025-08-11 LAB
APPEARANCE UR: CLEAR
BILIRUB UR STRIP-MCNC: NEGATIVE MG/DL
CANDIDA DNA VAG QL PROBE+SIG AMP: NEGATIVE
COLOR UR AUTO: NORMAL
G VAGINALIS DNA VAG QL PROBE+SIG AMP: POSITIVE
GLUCOSE UR STRIP.AUTO-MCNC: NEGATIVE MG/DL
KETONES UR STRIP.AUTO-MCNC: NEGATIVE MG/DL
LEUKOCYTE ESTERASE UR QL STRIP.AUTO: NORMAL
NITRITE UR QL STRIP.AUTO: NEGATIVE
PH UR STRIP.AUTO: 5.5 [PH] (ref 5–8)
POCT INT CON NEG: NEGATIVE
POCT INT CON POS: POSITIVE
POCT URINE PREGNANCY TEST: NEGATIVE
PROT UR QL STRIP: NEGATIVE MG/DL
RBC UR QL AUTO: NEGATIVE
SP GR UR STRIP.AUTO: 1.02
T VAGINALIS DNA VAG QL PROBE+SIG AMP: NEGATIVE
UROBILINOGEN UR STRIP-MCNC: 0.2 MG/DL

## 2025-08-11 PROCEDURE — 3078F DIAST BP <80 MM HG: CPT | Performed by: PHYSICIAN ASSISTANT

## 2025-08-11 PROCEDURE — 3074F SYST BP LT 130 MM HG: CPT | Performed by: PHYSICIAN ASSISTANT

## 2025-08-11 PROCEDURE — 87660 TRICHOMONAS VAGIN DIR PROBE: CPT

## 2025-08-11 PROCEDURE — 81025 URINE PREGNANCY TEST: CPT | Performed by: PHYSICIAN ASSISTANT

## 2025-08-11 PROCEDURE — 87591 N.GONORRHOEAE DNA AMP PROB: CPT

## 2025-08-11 PROCEDURE — 87480 CANDIDA DNA DIR PROBE: CPT

## 2025-08-11 PROCEDURE — 87510 GARDNER VAG DNA DIR PROBE: CPT

## 2025-08-11 PROCEDURE — 81002 URINALYSIS NONAUTO W/O SCOPE: CPT | Performed by: PHYSICIAN ASSISTANT

## 2025-08-11 PROCEDURE — 99212 OFFICE O/P EST SF 10 MIN: CPT | Performed by: PHYSICIAN ASSISTANT

## 2025-08-11 PROCEDURE — 87491 CHLMYD TRACH DNA AMP PROBE: CPT

## 2025-08-11 ASSESSMENT — FIBROSIS 4 INDEX: FIB4 SCORE: 0.51

## 2025-08-12 ENCOUNTER — HOSPITAL ENCOUNTER (OUTPATIENT)
Dept: LAB | Facility: MEDICAL CENTER | Age: 30
End: 2025-08-12
Attending: PHYSICIAN ASSISTANT
Payer: COMMERCIAL

## 2025-08-12 DIAGNOSIS — Z11.3 SCREENING EXAMINATION FOR STD (SEXUALLY TRANSMITTED DISEASE): ICD-10-CM

## 2025-08-12 LAB
C TRACH DNA GENITAL QL NAA+PROBE: NEGATIVE
HBV CORE AB SERPL QL IA: NONREACTIVE
HBV SURFACE AB SERPL IA-ACNC: 5.22 MIU/ML (ref 0–10)
HBV SURFACE AG SER QL: NORMAL
HCV AB SER QL: NORMAL
HIV 1+2 AB+HIV1 P24 AG SERPL QL IA: NORMAL
N GONORRHOEA DNA GENITAL QL NAA+PROBE: NEGATIVE
SPECIMEN SOURCE: NORMAL
T PALLIDUM AB SER QL IA: NORMAL

## 2025-08-12 PROCEDURE — 86706 HEP B SURFACE ANTIBODY: CPT

## 2025-08-12 PROCEDURE — 86780 TREPONEMA PALLIDUM: CPT

## 2025-08-12 PROCEDURE — 86704 HEP B CORE ANTIBODY TOTAL: CPT

## 2025-08-12 PROCEDURE — 87340 HEPATITIS B SURFACE AG IA: CPT

## 2025-08-12 PROCEDURE — 87389 HIV-1 AG W/HIV-1&-2 AB AG IA: CPT

## 2025-08-12 PROCEDURE — 36415 COLL VENOUS BLD VENIPUNCTURE: CPT

## 2025-08-12 PROCEDURE — 86803 HEPATITIS C AB TEST: CPT

## (undated) DEVICE — GLOVE BIOGEL INDICATOR SZ 7SURGICAL PF LTX - (50/BX 4BX/CA)

## (undated) DEVICE — GLOVE BIOGEL PI ORTHO SZ 8 PF LF (40PR/BX)

## (undated) DEVICE — TUBE E-T HI-LO CUFF 7.0MM (10EA/PK)

## (undated) DEVICE — SET TUBING PNEUMOCLEAR HIGH FLOW SMOKE EVACUATION (10EA/BX)

## (undated) DEVICE — SENSOR SPO2 NEO LNCS ADHESIVE (20/BX) SEE USER NOTES

## (undated) DEVICE — DETERGENT RENUZYME PLUS 10 OZ PACKET (50/BX)

## (undated) DEVICE — TUBING CLEARLINK DUO-VENT - C-FLO (48EA/CA)

## (undated) DEVICE — DRESSING TRANSPARENT FILM TEGADERM 2.375 X 2.75"  (100EA/BX)"

## (undated) DEVICE — CLIP MED LG INTNL HRZN TI ESCP - (20/BX)

## (undated) DEVICE — GOWN SURGEONS X-LARGE - DISP. (30/CA)

## (undated) DEVICE — BAG RETRIEVAL 10ML (10EA/BX)

## (undated) DEVICE — CHLORAPREP 26 ML APPLICATOR - ORANGE TINT(25/CA)

## (undated) DEVICE — GLOVE SZ 8 BIOGEL PI MICRO - PF LF (50PR/BX)

## (undated) DEVICE — SUCTION INSTRUMENT YANKAUER BULBOUS TIP W/O VENT (50EA/CA)

## (undated) DEVICE — PACK LAP CHOLE OR - (2EA/CA)

## (undated) DEVICE — TUBE CONNECT SUCTION CLEAR 120 X 1/4" (50EA/CA)"

## (undated) DEVICE — CANNULA W/SEAL 5X100 Z-THRE - ADED KII (12/BX)

## (undated) DEVICE — STERI STRIP COMPOUND BENZOIN - TINCTURE 0.6ML WITH APPLICATOR (40EA/BX)

## (undated) DEVICE — SODIUM CHL IRRIGATION 0.9% 1000ML (12EA/CA)

## (undated) DEVICE — SET SUCTION/IRRIGATION WITH DISPOSABLE TIP (6/CA )PART #0250-070-520 IS A SUB

## (undated) DEVICE — SET LEADWIRE 5 LEAD BEDSIDE DISPOSABLE ECG (1SET OF 5/EA)

## (undated) DEVICE — GLOVE BIOGEL PI INDICATOR SZ 8.0 SURGICAL PF LF -(50/BX 4BX/CA)

## (undated) DEVICE — DRAPESURG STERI-DRAPE LONG - (10/BX 4BX/CA)

## (undated) DEVICE — ELECTRODE DUAL RETURN W/ CORD - (50/PK)

## (undated) DEVICE — SUTURE GENERAL

## (undated) DEVICE — KIT ANESTHESIA W/CIRCUIT & 3/LT BAG W/FILTER (20EA/CA)

## (undated) DEVICE — DERMABOND ADVANCED - (12EA/BX)

## (undated) DEVICE — SUTURE 0 VICRYL PLUS UR-6 - 27 INCH (36/BX)

## (undated) DEVICE — SPONGE GAUZESTER. 2X2 4-PL - (2/PK 50PK/BX 30BX/CS)

## (undated) DEVICE — CANISTER SUCTION 3000ML MECHANICAL FILTER AUTO SHUTOFF MEDI-VAC NONSTERILE LF DISP  (40EA/CA)

## (undated) DEVICE — HEAD HOLDER JUNIOR/ADULT

## (undated) DEVICE — TROCAR LAPSCP 100MM 12MM NTHRD - (6/BX)

## (undated) DEVICE — LACTATED RINGERS INJ 1000 ML - (14EA/CA 60CA/PF)

## (undated) DEVICE — GLOVE BIOGEL SZ 8 SURGICAL PF LTX - (50PR/BX 4BX/CA)

## (undated) DEVICE — PROTECTOR ULNA NERVE - (36PR/CA)

## (undated) DEVICE — GLOVE BIOGEL SZ 7 SURGICAL PF LTX - (50PR/BX 4BX/CA)

## (undated) DEVICE — SET EXTENSION WITH 2 PORTS (48EA/CA) ***PART #2C8610 IS A SUBSTITUTE*****

## (undated) DEVICE — GLOVE BIOGEL INDICATOR SZ 8 SURGICAL PF LTX - (50/BX 4BX/CA)

## (undated) DEVICE — CLOSURE SKIN STRIP 1/2 X 4 IN - (STERI STRIP) (50/BX 4BX/CA)

## (undated) DEVICE — SUTURE 4-0 MONOCRYL PLUS PS-2 - 27 INCH (36/BX)

## (undated) DEVICE — TROCAR 5X100 NON BLADED Z-TH - READ KII (6/BX)

## (undated) DEVICE — MASK ANESTHESIA ADULT  - (100/CA)

## (undated) DEVICE — ELECTRODE 850 FOAM ADHESIVE - HYDROGEL RADIOTRNSPRNT (50/PK)

## (undated) DEVICE — NEPTUNE 4 PORT MANIFOLD - (20/PK)

## (undated) DEVICE — TROCAR Z THREAD11MM OPTICAL - NON BLADED(6/BX)